# Patient Record
Sex: MALE | Race: WHITE | NOT HISPANIC OR LATINO | Employment: UNEMPLOYED | URBAN - METROPOLITAN AREA
[De-identification: names, ages, dates, MRNs, and addresses within clinical notes are randomized per-mention and may not be internally consistent; named-entity substitution may affect disease eponyms.]

---

## 2023-08-27 ENCOUNTER — APPOINTMENT (OUTPATIENT)
Dept: GENERAL RADIOLOGY | Facility: HOSPITAL | Age: 38
End: 2023-08-27
Payer: MEDICARE

## 2023-08-27 VITALS
HEART RATE: 73 BPM | DIASTOLIC BLOOD PRESSURE: 118 MMHG | HEIGHT: 74 IN | BODY MASS INDEX: 23.74 KG/M2 | TEMPERATURE: 97.9 F | OXYGEN SATURATION: 98 % | RESPIRATION RATE: 16 BRPM | SYSTOLIC BLOOD PRESSURE: 151 MMHG | WEIGHT: 185 LBS

## 2023-08-27 PROCEDURE — 73630 X-RAY EXAM OF FOOT: CPT

## 2023-08-27 PROCEDURE — 99283 EMERGENCY DEPT VISIT LOW MDM: CPT

## 2023-08-28 ENCOUNTER — HOSPITAL ENCOUNTER (EMERGENCY)
Facility: HOSPITAL | Age: 38
Discharge: HOME | End: 2023-08-28
Attending: EMERGENCY MEDICINE | Admitting: EMERGENCY MEDICINE
Payer: MEDICARE

## 2023-08-28 DIAGNOSIS — S90.122A CONTUSION OF FIFTH TOE OF LEFT FOOT, INITIAL ENCOUNTER: Primary | ICD-10-CM
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
## 2023-08-28 NOTE — DISCHARGE INSTRUCTIONS
Ice pack, elevation, Ace wrap for support, nonweightbearing with crutches for the next 3 days and advance as tolerated.  If pain persists, continue nonweightbearing for 1 week and follow-up with podiatry.  Return for increased pain, swelling, discoloration or any other concerns.  Tylenol or ibuprofen for discomfort.  Follow-up with your doctor for blood pressure recheck in 1 week.

## 2024-01-16 ENCOUNTER — APPOINTMENT (OUTPATIENT)
Dept: CT IMAGING | Facility: HOSPITAL | Age: 39
DRG: 418 | End: 2024-01-16
Payer: MEDICARE

## 2024-01-16 ENCOUNTER — HOSPITAL ENCOUNTER (INPATIENT)
Facility: HOSPITAL | Age: 39
LOS: 6 days | Discharge: HOME HEALTH | DRG: 418 | End: 2024-01-25
Attending: EMERGENCY MEDICINE | Admitting: INTERNAL MEDICINE
Payer: MEDICARE

## 2024-01-16 DIAGNOSIS — R11.2 NAUSEA VOMITING AND DIARRHEA: Primary | ICD-10-CM

## 2024-01-16 DIAGNOSIS — K81.9 CHOLECYSTITIS: ICD-10-CM

## 2024-01-16 DIAGNOSIS — R19.7 NAUSEA VOMITING AND DIARRHEA: Primary | ICD-10-CM

## 2024-01-16 DIAGNOSIS — D72.829 LEUKOCYTOSIS, UNSPECIFIED TYPE: ICD-10-CM

## 2024-01-16 DIAGNOSIS — R10.9 ABDOMINAL PAIN, UNSPECIFIED ABDOMINAL LOCATION: ICD-10-CM

## 2024-01-16 PROBLEM — R11.10 VOMITING: Status: ACTIVE | Noted: 2024-01-16

## 2024-01-16 LAB
ALBUMIN SERPL-MCNC: 4.8 G/DL (ref 3.5–5.2)
ALBUMIN/GLOB SERPL: 2.1 G/DL
ALP SERPL-CCNC: 61 U/L (ref 39–117)
ALT SERPL W P-5'-P-CCNC: 47 U/L (ref 1–41)
ANION GAP SERPL CALCULATED.3IONS-SCNC: 17 MMOL/L (ref 5–15)
AST SERPL-CCNC: 35 U/L (ref 1–40)
B PARAPERT DNA SPEC QL NAA+PROBE: NOT DETECTED
B PERT DNA SPEC QL NAA+PROBE: NOT DETECTED
BACTERIA UR QL AUTO: ABNORMAL /HPF
BASOPHILS # BLD AUTO: 0.1 10*3/MM3 (ref 0–0.2)
BASOPHILS NFR BLD AUTO: 0.4 % (ref 0–1.5)
BILIRUB SERPL-MCNC: 0.5 MG/DL (ref 0–1.2)
BILIRUB UR QL STRIP: NEGATIVE
BUN SERPL-MCNC: 14 MG/DL (ref 6–20)
BUN/CREAT SERPL: 18.2 (ref 7–25)
C PNEUM DNA NPH QL NAA+NON-PROBE: NOT DETECTED
CALCIUM SPEC-SCNC: 10.3 MG/DL (ref 8.6–10.5)
CHLORIDE SERPL-SCNC: 99 MMOL/L (ref 98–107)
CLARITY UR: CLEAR
CO2 SERPL-SCNC: 24 MMOL/L (ref 22–29)
COLOR UR: YELLOW
CREAT SERPL-MCNC: 0.77 MG/DL (ref 0.76–1.27)
DEPRECATED RDW RBC AUTO: 45.9 FL (ref 37–54)
EGFRCR SERPLBLD CKD-EPI 2021: 116.8 ML/MIN/1.73
EOSINOPHIL # BLD AUTO: 0.1 10*3/MM3 (ref 0–0.4)
EOSINOPHIL NFR BLD AUTO: 0.6 % (ref 0.3–6.2)
ERYTHROCYTE [DISTWIDTH] IN BLOOD BY AUTOMATED COUNT: 14.3 % (ref 12.3–15.4)
FLUAV SUBTYP SPEC NAA+PROBE: NOT DETECTED
FLUBV RNA ISLT QL NAA+PROBE: NOT DETECTED
GLOBULIN UR ELPH-MCNC: 2.3 GM/DL
GLUCOSE SERPL-MCNC: 171 MG/DL (ref 65–99)
GLUCOSE UR STRIP-MCNC: NEGATIVE MG/DL
HADV DNA SPEC NAA+PROBE: NOT DETECTED
HBA1C MFR BLD: 5.5 % (ref 4.8–5.6)
HCOV 229E RNA SPEC QL NAA+PROBE: NOT DETECTED
HCOV HKU1 RNA SPEC QL NAA+PROBE: NOT DETECTED
HCOV NL63 RNA SPEC QL NAA+PROBE: NOT DETECTED
HCOV OC43 RNA SPEC QL NAA+PROBE: NOT DETECTED
HCT VFR BLD AUTO: 50.7 % (ref 37.5–51)
HGB BLD-MCNC: 17.1 G/DL (ref 13–17.7)
HGB UR QL STRIP.AUTO: ABNORMAL
HMPV RNA NPH QL NAA+NON-PROBE: NOT DETECTED
HOLD SPECIMEN: NORMAL
HOLD SPECIMEN: NORMAL
HPIV1 RNA ISLT QL NAA+PROBE: NOT DETECTED
HPIV2 RNA SPEC QL NAA+PROBE: NOT DETECTED
HPIV3 RNA NPH QL NAA+PROBE: NOT DETECTED
HPIV4 P GENE NPH QL NAA+PROBE: NOT DETECTED
HYALINE CASTS UR QL AUTO: ABNORMAL /LPF
INR PPP: 4.51 (ref 2–3)
KETONES UR QL STRIP: NEGATIVE
LEUKOCYTE ESTERASE UR QL STRIP.AUTO: NEGATIVE
LIPASE SERPL-CCNC: 28 U/L (ref 13–60)
LYMPHOCYTES # BLD AUTO: 1.6 10*3/MM3 (ref 0.7–3.1)
LYMPHOCYTES NFR BLD AUTO: 10.1 % (ref 19.6–45.3)
M PNEUMO IGG SER IA-ACNC: NOT DETECTED
MAGNESIUM SERPL-MCNC: 1.7 MG/DL (ref 1.6–2.6)
MCH RBC QN AUTO: 29.8 PG (ref 26.6–33)
MCHC RBC AUTO-ENTMCNC: 33.8 G/DL (ref 31.5–35.7)
MCV RBC AUTO: 88.2 FL (ref 79–97)
MONOCYTES # BLD AUTO: 1.1 10*3/MM3 (ref 0.1–0.9)
MONOCYTES NFR BLD AUTO: 6.6 % (ref 5–12)
NEUTROPHILS NFR BLD AUTO: 13.4 10*3/MM3 (ref 1.7–7)
NEUTROPHILS NFR BLD AUTO: 82.3 % (ref 42.7–76)
NITRITE UR QL STRIP: NEGATIVE
NRBC BLD AUTO-RTO: 0.2 /100 WBC (ref 0–0.2)
PH UR STRIP.AUTO: 8.5 [PH] (ref 5–8)
PLATELET # BLD AUTO: 223 10*3/MM3 (ref 140–450)
PMV BLD AUTO: 9.7 FL (ref 6–12)
POTASSIUM SERPL-SCNC: 3.2 MMOL/L (ref 3.5–5.2)
PROT SERPL-MCNC: 7.1 G/DL (ref 6–8.5)
PROT UR QL STRIP: ABNORMAL
PROTHROMBIN TIME: 44 SECONDS (ref 19.4–28.5)
RBC # BLD AUTO: 5.74 10*6/MM3 (ref 4.14–5.8)
RBC # UR STRIP: ABNORMAL /HPF
REF LAB TEST METHOD: ABNORMAL
RHINOVIRUS RNA SPEC NAA+PROBE: NOT DETECTED
RSV RNA NPH QL NAA+NON-PROBE: NOT DETECTED
SARS-COV-2 RNA NPH QL NAA+NON-PROBE: NOT DETECTED
SODIUM SERPL-SCNC: 140 MMOL/L (ref 136–145)
SP GR UR STRIP: 1.01 (ref 1–1.03)
SQUAMOUS #/AREA URNS HPF: ABNORMAL /HPF
UROBILINOGEN UR QL STRIP: ABNORMAL
WBC # UR STRIP: ABNORMAL /HPF
WBC NRBC COR # BLD AUTO: 16.3 10*3/MM3 (ref 3.4–10.8)
WHOLE BLOOD HOLD COAG: NORMAL
WHOLE BLOOD HOLD SPECIMEN: NORMAL

## 2024-01-16 PROCEDURE — 25010000002 DICYCLOMINE PER 20 MG: Performed by: EMERGENCY MEDICINE

## 2024-01-16 PROCEDURE — 51798 US URINE CAPACITY MEASURE: CPT

## 2024-01-16 PROCEDURE — 83690 ASSAY OF LIPASE: CPT | Performed by: NURSE PRACTITIONER

## 2024-01-16 PROCEDURE — G0378 HOSPITAL OBSERVATION PER HR: HCPCS

## 2024-01-16 PROCEDURE — 81001 URINALYSIS AUTO W/SCOPE: CPT | Performed by: NURSE PRACTITIONER

## 2024-01-16 PROCEDURE — 25810000003 SODIUM CHLORIDE 0.9 % SOLUTION: Performed by: NURSE PRACTITIONER

## 2024-01-16 PROCEDURE — 83735 ASSAY OF MAGNESIUM: CPT | Performed by: NURSE PRACTITIONER

## 2024-01-16 PROCEDURE — 99285 EMERGENCY DEPT VISIT HI MDM: CPT

## 2024-01-16 PROCEDURE — 74174 CTA ABD&PLVS W/CONTRAST: CPT

## 2024-01-16 PROCEDURE — 85610 PROTHROMBIN TIME: CPT | Performed by: NURSE PRACTITIONER

## 2024-01-16 PROCEDURE — 83036 HEMOGLOBIN GLYCOSYLATED A1C: CPT | Performed by: NURSE PRACTITIONER

## 2024-01-16 PROCEDURE — 25010000002 DIPHENHYDRAMINE PER 50 MG: Performed by: NURSE PRACTITIONER

## 2024-01-16 PROCEDURE — 36415 COLL VENOUS BLD VENIPUNCTURE: CPT

## 2024-01-16 PROCEDURE — 25010000002 ONDANSETRON PER 1 MG: Performed by: NURSE PRACTITIONER

## 2024-01-16 PROCEDURE — 93005 ELECTROCARDIOGRAM TRACING: CPT | Performed by: NURSE PRACTITIONER

## 2024-01-16 PROCEDURE — 85025 COMPLETE CBC W/AUTO DIFF WBC: CPT | Performed by: NURSE PRACTITIONER

## 2024-01-16 PROCEDURE — 25510000001 IOPAMIDOL PER 1 ML: Performed by: NURSE PRACTITIONER

## 2024-01-16 PROCEDURE — 80053 COMPREHEN METABOLIC PANEL: CPT | Performed by: NURSE PRACTITIONER

## 2024-01-16 PROCEDURE — 25010000002 METOCLOPRAMIDE PER 10 MG: Performed by: NURSE PRACTITIONER

## 2024-01-16 PROCEDURE — 25010000002 MORPHINE PER 10 MG: Performed by: NURSE PRACTITIONER

## 2024-01-16 PROCEDURE — 0202U NFCT DS 22 TRGT SARS-COV-2: CPT | Performed by: NURSE PRACTITIONER

## 2024-01-16 RX ORDER — ACETAMINOPHEN 160 MG/5ML
650 SOLUTION ORAL EVERY 4 HOURS PRN
Status: DISCONTINUED | OUTPATIENT
Start: 2024-01-16 | End: 2024-01-25 | Stop reason: HOSPADM

## 2024-01-16 RX ORDER — SERTRALINE HYDROCHLORIDE 100 MG/1
200 TABLET, FILM COATED ORAL DAILY
COMMUNITY

## 2024-01-16 RX ORDER — ACETAMINOPHEN 325 MG/1
650 TABLET ORAL EVERY 4 HOURS PRN
Status: DISCONTINUED | OUTPATIENT
Start: 2024-01-16 | End: 2024-01-25 | Stop reason: HOSPADM

## 2024-01-16 RX ORDER — SERTRALINE HYDROCHLORIDE 100 MG/1
200 TABLET, FILM COATED ORAL DAILY
Status: DISCONTINUED | OUTPATIENT
Start: 2024-01-16 | End: 2024-01-25 | Stop reason: HOSPADM

## 2024-01-16 RX ORDER — SODIUM CHLORIDE 9 MG/ML
125 INJECTION, SOLUTION INTRAVENOUS CONTINUOUS
Status: DISCONTINUED | OUTPATIENT
Start: 2024-01-16 | End: 2024-01-25 | Stop reason: HOSPADM

## 2024-01-16 RX ORDER — SODIUM CHLORIDE 0.9 % (FLUSH) 0.9 %
10 SYRINGE (ML) INJECTION AS NEEDED
Status: DISCONTINUED | OUTPATIENT
Start: 2024-01-16 | End: 2024-01-25 | Stop reason: HOSPADM

## 2024-01-16 RX ORDER — DIPHENHYDRAMINE HYDROCHLORIDE 50 MG/ML
12.5 INJECTION INTRAMUSCULAR; INTRAVENOUS ONCE
Status: COMPLETED | OUTPATIENT
Start: 2024-01-16 | End: 2024-01-16

## 2024-01-16 RX ORDER — LOSARTAN POTASSIUM 50 MG/1
100 TABLET ORAL DAILY
Status: DISCONTINUED | OUTPATIENT
Start: 2024-01-16 | End: 2024-01-25 | Stop reason: HOSPADM

## 2024-01-16 RX ORDER — POLYETHYLENE GLYCOL 3350 17 G/17G
17 POWDER, FOR SOLUTION ORAL DAILY PRN
Status: DISCONTINUED | OUTPATIENT
Start: 2024-01-16 | End: 2024-01-25 | Stop reason: HOSPADM

## 2024-01-16 RX ORDER — ATORVASTATIN CALCIUM 10 MG/1
10 TABLET, FILM COATED ORAL NIGHTLY
Status: DISCONTINUED | OUTPATIENT
Start: 2024-01-16 | End: 2024-01-25 | Stop reason: HOSPADM

## 2024-01-16 RX ORDER — ACETAMINOPHEN 650 MG/1
650 SUPPOSITORY RECTAL EVERY 4 HOURS PRN
Status: DISCONTINUED | OUTPATIENT
Start: 2024-01-16 | End: 2024-01-25 | Stop reason: HOSPADM

## 2024-01-16 RX ORDER — BISACODYL 10 MG
10 SUPPOSITORY, RECTAL RECTAL DAILY PRN
Status: DISCONTINUED | OUTPATIENT
Start: 2024-01-16 | End: 2024-01-25 | Stop reason: HOSPADM

## 2024-01-16 RX ORDER — ATORVASTATIN CALCIUM 10 MG/1
10 TABLET, FILM COATED ORAL DAILY
COMMUNITY
Start: 2023-07-29

## 2024-01-16 RX ORDER — AMOXICILLIN 250 MG
2 CAPSULE ORAL 2 TIMES DAILY
Status: DISCONTINUED | OUTPATIENT
Start: 2024-01-16 | End: 2024-01-25 | Stop reason: HOSPADM

## 2024-01-16 RX ORDER — METOCLOPRAMIDE HYDROCHLORIDE 5 MG/ML
10 INJECTION INTRAMUSCULAR; INTRAVENOUS EVERY 6 HOURS PRN
Status: DISCONTINUED | OUTPATIENT
Start: 2024-01-16 | End: 2024-01-25 | Stop reason: HOSPADM

## 2024-01-16 RX ORDER — ONDANSETRON 2 MG/ML
4 INJECTION INTRAMUSCULAR; INTRAVENOUS ONCE
Status: COMPLETED | OUTPATIENT
Start: 2024-01-16 | End: 2024-01-16

## 2024-01-16 RX ORDER — NITROGLYCERIN 0.4 MG/1
0.4 TABLET SUBLINGUAL
Status: DISCONTINUED | OUTPATIENT
Start: 2024-01-16 | End: 2024-01-25 | Stop reason: HOSPADM

## 2024-01-16 RX ORDER — DICYCLOMINE HYDROCHLORIDE 10 MG/ML
20 INJECTION INTRAMUSCULAR 4 TIMES DAILY PRN
Status: DISCONTINUED | OUTPATIENT
Start: 2024-01-16 | End: 2024-01-25 | Stop reason: HOSPADM

## 2024-01-16 RX ORDER — SODIUM CHLORIDE 9 MG/ML
40 INJECTION, SOLUTION INTRAVENOUS AS NEEDED
Status: DISCONTINUED | OUTPATIENT
Start: 2024-01-16 | End: 2024-01-25 | Stop reason: HOSPADM

## 2024-01-16 RX ORDER — SODIUM CHLORIDE 0.9 % (FLUSH) 0.9 %
10 SYRINGE (ML) INJECTION EVERY 12 HOURS SCHEDULED
Status: DISCONTINUED | OUTPATIENT
Start: 2024-01-16 | End: 2024-01-25 | Stop reason: HOSPADM

## 2024-01-16 RX ORDER — LOSARTAN POTASSIUM 100 MG/1
100 TABLET ORAL DAILY
COMMUNITY

## 2024-01-16 RX ORDER — ALUMINA, MAGNESIA, AND SIMETHICONE 2400; 2400; 240 MG/30ML; MG/30ML; MG/30ML
15 SUSPENSION ORAL EVERY 6 HOURS PRN
Status: DISCONTINUED | OUTPATIENT
Start: 2024-01-16 | End: 2024-01-25 | Stop reason: HOSPADM

## 2024-01-16 RX ORDER — BISACODYL 5 MG/1
5 TABLET, DELAYED RELEASE ORAL DAILY PRN
Status: DISCONTINUED | OUTPATIENT
Start: 2024-01-16 | End: 2024-01-25 | Stop reason: HOSPADM

## 2024-01-16 RX ORDER — DICYCLOMINE HYDROCHLORIDE 10 MG/ML
20 INJECTION INTRAMUSCULAR ONCE
Status: COMPLETED | OUTPATIENT
Start: 2024-01-16 | End: 2024-01-16

## 2024-01-16 RX ORDER — WARFARIN SODIUM 7.5 MG/1
7.5 TABLET ORAL NIGHTLY
COMMUNITY
Start: 2023-10-25

## 2024-01-16 RX ORDER — POTASSIUM CHLORIDE 20 MEQ/1
40 TABLET, EXTENDED RELEASE ORAL EVERY 4 HOURS
Status: COMPLETED | OUTPATIENT
Start: 2024-01-16 | End: 2024-01-16

## 2024-01-16 RX ORDER — PROMETHAZINE HYDROCHLORIDE 12.5 MG/1
12.5 TABLET ORAL EVERY 6 HOURS PRN
Status: DISCONTINUED | OUTPATIENT
Start: 2024-01-16 | End: 2024-01-16

## 2024-01-16 RX ADMIN — ATORVASTATIN CALCIUM 10 MG: 10 TABLET, FILM COATED ORAL at 20:55

## 2024-01-16 RX ADMIN — DICYCLOMINE HYDROCHLORIDE 20 MG: 10 INJECTION, SOLUTION INTRAMUSCULAR at 10:19

## 2024-01-16 RX ADMIN — SERTRALINE 200 MG: 100 TABLET, FILM COATED ORAL at 13:03

## 2024-01-16 RX ADMIN — SODIUM CHLORIDE 125 ML/HR: 9 INJECTION, SOLUTION INTRAVENOUS at 23:33

## 2024-01-16 RX ADMIN — Medication 10 ML: at 20:56

## 2024-01-16 RX ADMIN — IOPAMIDOL 100 ML: 755 INJECTION, SOLUTION INTRAVENOUS at 08:07

## 2024-01-16 RX ADMIN — Medication 10 ML: at 13:03

## 2024-01-16 RX ADMIN — SODIUM CHLORIDE 125 ML/HR: 9 INJECTION, SOLUTION INTRAVENOUS at 16:54

## 2024-01-16 RX ADMIN — ACETAMINOPHEN 650 MG: 325 TABLET, FILM COATED ORAL at 18:10

## 2024-01-16 RX ADMIN — SODIUM CHLORIDE 1000 ML: 9 INJECTION, SOLUTION INTRAVENOUS at 07:15

## 2024-01-16 RX ADMIN — POTASSIUM CHLORIDE 40 MEQ: 1500 TABLET, EXTENDED RELEASE ORAL at 18:08

## 2024-01-16 RX ADMIN — POTASSIUM CHLORIDE 40 MEQ: 1500 TABLET, EXTENDED RELEASE ORAL at 14:15

## 2024-01-16 RX ADMIN — DIPHENHYDRAMINE HYDROCHLORIDE 12.5 MG: 50 INJECTION, SOLUTION INTRAMUSCULAR; INTRAVENOUS at 10:19

## 2024-01-16 RX ADMIN — ONDANSETRON 4 MG: 2 INJECTION INTRAMUSCULAR; INTRAVENOUS at 07:15

## 2024-01-16 RX ADMIN — LOSARTAN POTASSIUM 100 MG: 50 TABLET, FILM COATED ORAL at 13:03

## 2024-01-16 RX ADMIN — SODIUM CHLORIDE 125 ML/HR: 9 INJECTION, SOLUTION INTRAVENOUS at 10:20

## 2024-01-16 RX ADMIN — MORPHINE SULFATE 4 MG: 4 INJECTION, SOLUTION INTRAMUSCULAR; INTRAVENOUS at 07:15

## 2024-01-16 RX ADMIN — ACETAMINOPHEN 650 MG: 325 TABLET, FILM COATED ORAL at 23:34

## 2024-01-16 RX ADMIN — METOCLOPRAMIDE 10 MG: 5 INJECTION, SOLUTION INTRAMUSCULAR; INTRAVENOUS at 14:15

## 2024-01-16 NOTE — PROGRESS NOTES
"Pharmacy dosing service  Anticoagulant  Warfarin     Subjective:    James Rodriguez is a 38 y.o.male being continued on warfarin for Aortic Valve Replacement.    INR Goal: 2 - 3  Home medication?: warfarin 7.5 mg PO daily  Bridge Therapy Present?:  No  Interacting Medications Evaluation (New/Present/Discontinued):   Additional Contributing Factors:       Assessment/Plan:    INR on admission is 4.51 which is above the goal of 2-3. Will hold tonight and monitor INR trends for an appropriate time to restart.      Continue to monitor and adjust based on INR.         Date 1/16           INR 4.51           Dose HOLD               Objective:  [Ht: 190.5 cm (75\"); Wt: 77.1 kg (169 lb 15.6 oz); BMI: Body mass index is 21.25 kg/m².]    Lab Results   Component Value Date    ALBUMIN 4.8 01/16/2024     Lab Results   Component Value Date    INR 4.51 (H) 01/16/2024    INR 1.6 12/29/2022    INR 3.2 05/22/2021    PROTIME 44.0 (H) 01/16/2024    PROTIME 16.2 (H) 12/29/2022    PROTIME 33.5 (H) 05/22/2021     Lab Results   Component Value Date    HGB 17.1 01/16/2024     Lab Results   Component Value Date    HCT 50.7 01/16/2024       Andres Ledezma [facility]  01/16/24 14:34 EST       "

## 2024-01-16 NOTE — H&P
[facility] Observation Unit H&P    Patient Name: James Rodriguez  : 1985  MRN: [id number]  Primary Care Physician: Wally Henderson MD  Date of admission: 2024     Patient Care Team:  Wally Henderson MD as PCP - General (Internal Medicine)          Subjective   History Present Illness     Chief Complaint:   Chief Complaint   Patient presents with    Illness     Ilness    History of Present Illness  ED 2024   Context: 38-year-old male past medical history significant for Marfan's on warfarin who presents with his girlfriend with complaints of abdominal pain/epigastric pain radiating into his back with nausea vomiting and diarrhea.  Was seen in urgent care on  and states his cough and congestion has improved.  Girlfriend has also had GI symptoms of nausea vomiting and diarrhea as well.  He denies any fever or urinary complaints.  Denies any associated diaphoresis chest heaviness or pressure.  States he gets his INR checked every 3 months.    Observation 2024  Patient agrees with HPI noted above including generalized abdominal pain, nausea, vomiting and diarrhea for the past 2 days.  He reports that girlfriend had similar symptoms 2 days prior.  He denies any food triggers or recent travel.  Reports abdominal pain 8/10.  He states that he is on Coumadin for an artificial valve replacement but he cannot recall which valve.      Review of Systems   Constitutional: Positive for malaise/fatigue. Negative for chills and fever.   Gastrointestinal:  Positive for abdominal pain, diarrhea, nausea and vomiting. Negative for melena.   All other systems reviewed and are negative.          Personal History     Past Medical History:   Past Medical History:   Diagnosis Date    Hyperlipidemia     Hypertension     Marfan's disease        Surgical History:    History reviewed. No pertinent surgical history.        Family History: family history is not on file. Otherwise pertinent FHx was  reviewed and unremarkable.     Social History:  reports that he has been smoking cigarettes. He has been smoking an average of .5 packs per day. He has never used smokeless tobacco. He reports current alcohol use. He reports current drug use. Drug: Marijuana.      Medications:  Prior to Admission medications    Medication Sig Start Date End Date Taking? Authorizing Provider   atorvastatin (LIPITOR) 10 MG tablet Take 1 tablet by mouth Daily. 7/29/23  Yes Saul Ahn MD   losartan (COZAAR) 100 MG tablet Take 1 tablet by mouth Daily.   Yes Saul Ahn MD   sertraline (ZOLOFT) 100 MG tablet Take 2 tablets by mouth Daily.   Yes Saul Ahn MD   warfarin (COUMADIN) 7.5 MG tablet Take 1 tablet by mouth Every Night. Tuesday, Thursday, Saturday, Corey 10/25/23  Yes Saul Ahn MD   amoxicillin-clavulanate (AUGMENTIN) 875-125 MG per tablet Take 1 tablet by mouth 2 (Two) Times a Day. 12/22/23 1/16/24  Tyrell Min APRN   FLUoxetine (PROzac) 20 MG capsule Take 1 capsule by mouth Daily.  1/16/24  Saul Ahn MD   warfarin (COUMADIN) 3 MG tablet Take 1 tablet by mouth Daily.  1/16/24  ProviderSaul MD       Allergies:    Allergies   Allergen Reactions    Latex Other (See Comments)     Primary care provider        Objective   Objective     Vital Signs  Temp:  [96.9 °F (36.1 °C)] 96.9 °F (36.1 °C)  Heart Rate:  [48-55] 54  Resp:  [17-24] 17  BP: (158-183)/(65-82) 173/81  SpO2:  [98 %-100 %] 100 %  on   ;   Device (Oxygen Therapy): room air  Body mass index is 21.25 kg/m².    Physical Exam  Vitals and nursing note reviewed.   Constitutional:       Appearance: Normal appearance. He is ill-appearing.   HENT:      Head: Normocephalic and atraumatic.      Right Ear: External ear normal.      Left Ear: External ear normal.      Nose: Nose normal.      Mouth/Throat:      Mouth: Mucous membranes are moist.   Eyes:      Extraocular Movements: Extraocular movements intact.    Cardiovascular:      Rate and Rhythm: Normal rate and regular rhythm.      Pulses: Normal pulses.      Heart sounds: Normal heart sounds.   Pulmonary:      Effort: Pulmonary effort is normal.      Breath sounds: Normal breath sounds.   Abdominal:      General: Abdomen is flat. Bowel sounds are normal.      Palpations: Abdomen is soft.      Tenderness: There is abdominal tenderness.   Musculoskeletal:         General: Normal range of motion.      Cervical back: Normal range of motion.   Skin:     General: Skin is warm.   Neurological:      Mental Status: He is alert and oriented to person, place, and time.   Psychiatric:         Behavior: Behavior normal.         Thought Content: Thought content normal.         Judgment: Judgment normal.           Results Review:  I have personally reviewed most recent lab results and radiology images and interpretations and agree with findings, most notably: CMP, lipase, PT/INR, CBC and CT abdomen pelvis.    Results from last 7 days   Lab Units 01/16/24  0641   WBC 10*3/mm3 16.30*   HEMOGLOBIN g/dL 17.1   HEMATOCRIT % 50.7   PLATELETS 10*3/mm3 223   INR  4.51*     Results from last 7 days   Lab Units 01/16/24  0641   SODIUM mmol/L 140   POTASSIUM mmol/L 3.2*   CHLORIDE mmol/L 99   CO2 mmol/L 24.0   BUN mg/dL 14   CREATININE mg/dL 0.77   GLUCOSE mg/dL 171*   CALCIUM mg/dL 10.3   ALK PHOS U/L 61   ALT (SGPT) U/L 47*   AST (SGOT) U/L 35     Estimated Creatinine Clearance: 140.5 mL/min (by C-G formula based on SCr of 0.77 mg/dL).  Brief Urine Lab Results  (Last result in the past 365 days)        Color   Clarity   Blood   Leuk Est   Nitrite   Protein   CREAT   Urine HCG        01/16/24 0712 Yellow   Clear   Small (1+)   Negative   Negative   Trace                   Microbiology Results (last 10 days)       Procedure Component Value - Date/Time    Respiratory Panel PCR w/COVID-19(SARS-CoV-2) KATHERINE/GAUDENCIO/SARAH/PAD/COR/MICHAEL In-House, NP Swab in UTM/VTM, 2 HR TAT - Swab, Nasopharynx [[id number]]   (Normal) Collected: 01/16/24 0713    Lab Status: Final result Specimen: Swab from Nasopharynx Updated: 01/16/24 0821     ADENOVIRUS, PCR Not Detected     Coronavirus 229E Not Detected     Coronavirus HKU1 Not Detected     Coronavirus NL63 Not Detected     Coronavirus OC43 Not Detected     COVID19 Not Detected     Human Metapneumovirus Not Detected     Human Rhinovirus/Enterovirus Not Detected     Influenza A PCR Not Detected     Influenza B PCR Not Detected     Parainfluenza Virus 1 Not Detected     Parainfluenza Virus 2 Not Detected     Parainfluenza Virus 3 Not Detected     Parainfluenza Virus 4 Not Detected     RSV, PCR Not Detected     Bordetella pertussis pcr Not Detected     Bordetella parapertussis PCR Not Detected     Chlamydophila pneumoniae PCR Not Detected     Mycoplasma pneumo by PCR Not Detected    Narrative:      In the setting of a positive respiratory panel with a viral infection PLUS a negative procalcitonin without other underlying concern for bacterial infection, consider observing off antibiotics or discontinuation of antibiotics and continue supportive care. If the respiratory panel is positive for atypical bacterial infection (Bordetella pertussis, Chlamydophila pneumoniae, or Mycoplasma pneumoniae), consider antibiotic de-escalation to target atypical bacterial infection.            ECG/EMG Results (most recent)       Procedure Component Value Units Date/Time    ECG 12 Lead Chest Pain [[id number]] Collected: 01/16/24 0708     Updated: 01/16/24 0710     QT Interval 626 ms      QTC Interval 593 ms     Narrative:      HEART RATE= 54  bpm  RR Interval= 1116  ms  HI Interval= 204  ms  P Horizontal Axis= -48  deg  P Front Axis= 3  deg  QRSD Interval= 144  ms  QT Interval= 626  ms  QTcB= 593  ms  QRS Axis= 101  deg  T Wave Axis= 85  deg  - ABNORMAL ECG -  Sinus bradycardia  Borderline prolonged HI interval  Consider left ventricular hypertrophy  Prolonged QT interval  Electronically Signed By:   Date  "and Time of Study: 2024-01-16 07:08:57    ECG 12 Lead QT Measurement [[id number]] Collected: 01/16/24 1001     Updated: 01/16/24 1002     QT Interval 685 ms      QTC Interval 628 ms     Narrative:      HEART RATE= 51  bpm  RR Interval= 1188  ms  AZ Interval= 223  ms  P Horizontal Axis=   deg  P Front Axis= -48  deg  QRSD Interval= 152  ms  QT Interval= 685  ms  QTcB= 628  ms  QRS Axis= 100  deg  T Wave Axis= 90  deg  - ABNORMAL ECG -  Sinus or ectopic atrial bradycardia  Prolonged AZ interval  Consider left ventricular hypertrophy  Abnormal T, probable ischemia, lateral leads  Prolonged QT interval  When compared with ECG of 16-Jan-2024 7:08:57,  Significant change in rhythm: previously sinus  New or worsened ischemia or infarction  Electronically Signed By:   Date and Time of Study: 2024-01-16 10:01:32                    CT Angiogram Abdomen Pelvis    Result Date: 1/16/2024  Impression: 1. No evidence of aortoiliac aneurysm or stenosis. 2. Shotty, somewhat numerous lymph nodes in the small bowel mesentery, which may be reactive, possibly viral mesenteric lymphadenitis. 3. Fecalization of distal small bowel, with no visible obstruction. Short segment of distal ileum with numerous diverticuli. Although this is somewhat unusual, there is no visible inflammatory change to suggest small bowel diverticulitis. Consider follow-up scan if patient symptoms persist or worsen. 4. Questionable gallbladder \"sludge\". No visible gallbladder wall inflammation or evidence of biliary ductal dilatation. 5. No evidence of potential inflammatory focus, bowel obstruction, obstructive uropathy, or other acute disease is seen. Electronically Signed: Cesar Bernal MD  1/16/2024 8:55 AM EST  Workstation ID: [id number]       Estimated Creatinine Clearance: 140.5 mL/min (by C-G formula based on SCr of 0.77 mg/dL).    Assessment & Plan   Assessment/Plan       Active Hospital Problems    Diagnosis  POA    **Vomiting [R11.10]  Yes      Resolved " Hospital Problems   No resolved problems to display.       Vomiting and abdominal pain  Lab Results   Component Value Date    WBC 16.30 (H) 01/16/2024    AST 35 01/16/2024    ALT 47 (H) 01/16/2024    ALKPHOS 61 01/16/2024    BILITOT 0.5 01/16/2024    LIPASE 28 01/16/2024   -CMP showed potassium 3.2 this morning.  K. Dur x 2 ordered  -Lipase 28  -CBC showed WBC 16.38 and neutrophil 82.3  -UA negative for bacteria and nitrites  -CT of CMP abdomen and pelvis: Showed no evidence of a aortoiliac aneurysm or stenosis.  No evidence of bowel obstruction.  Shotty lymph nodes in the small bowel suggesting possible viral mesenteric lymphadenitis   -In the ED patient received Bentyl, Benadryl, and iopamidol, morphine, Zofran, 1 L normal saline bolus  -Tylenol, Bentyl, Reglan, promethazine ordered  -EKG showed sinus bradycardia with heart rate of 51 and prolonged QT interval.  Will avoid Zofran  -Clear liquid diet, advance as tolerated  -GI panel ordered  -Monitor labs while admitted     Hypertension  -Poorly controlled   BP Readings from Last 1 Encounters:   01/16/24 173/81   - Continue Cozaar  - Monitor while admitted     Hyperlipidemia  -Continue Lipitor    Depression/anxiety  -Continue Zoloft    Elevated INR  -Patient reports he is on Coumadin for an artificial heart valve.  Does not recall which valve  -INR 4.51  -Hold Coumadin today  -Consult pharmacy to dose Coumadin  -Recheck INR in a.m.    VTE Prophylaxis -   Mechanical Order History:        Ordered        01/16/24 1056  Place Sequential Compression Device  Once            01/16/24 1056  Maintain Sequential Compression Device  Continuous                          Pharmalogical Order History:       None            CODE STATUS:    Code Status and Medical Interventions:   Ordered at: 01/16/24 1046     Level Of Support Discussed With:    Patient     Code Status (Patient has no pulse and is not breathing):    CPR (Attempt to Resuscitate)     Medical Interventions (Patient has  pulse or is breathing):    Full Support       This patient has been examined wearing personal protective equipment.     I discussed the patient's findings and my recommendations with patient and nursing staff.      Signature:Electronically signed by STEVE Cosme, 01/16/24, 1:19 PM EST.

## 2024-01-16 NOTE — ED NOTES
"Patient requesting nausea medication post bladder scanner due to the pressure of the probe and that the [place] wipe smell was making him \"sick to his stomach\" ER provider notified   "

## 2024-01-16 NOTE — PLAN OF CARE
Problem: Adult Inpatient Plan of Care  Goal: Absence of Hospital-Acquired Illness or Injury  Intervention: Identify and Manage Fall Risk  Recent Flowsheet Documentation  Taken 1/16/2024 1244 by Amy Messina RN  Safety Promotion/Fall Prevention: safety round/check completed  Intervention: Prevent Skin Injury  Recent Flowsheet Documentation  Taken 1/16/2024 1244 by Amy Messina RN  Body Position: position changed independently  Intervention: Prevent and Manage VTE (Venous Thromboembolism) Risk  Recent Flowsheet Documentation  Taken 1/16/2024 1244 by Amy Messina RN  Activity Management: up ad yann  Goal: Optimal Comfort and Wellbeing  Intervention: Provide Person-Centered Care  Recent Flowsheet Documentation  Taken 1/16/2024 1244 by Amy Messina RN  Trust Relationship/Rapport:   care explained   choices provided   emotional support provided   empathic listening provided   questions answered   questions encouraged   reassurance provided   thoughts/feelings acknowledged  Goal: Readiness for Transition of Care  Intervention: Mutually Develop Transition Plan  Recent Flowsheet Documentation  Taken 1/16/2024 1258 by Amy Messina RN  Transportation Anticipated: family or friend will provide  Patient/Family Anticipated Services at Transition: none  Patient/Family Anticipates Transition to: home  Taken 1/16/2024 1257 by Amy Messina, RN  Equipment Currently Used at Home: none   Goal Outcome Evaluation:               New admit from ED to OBS with vomiting

## 2024-01-16 NOTE — ED PROVIDER NOTES
Subjective   History of Present Illness  Chief complaint: Abdominal pain      Context: 38-year-old male past medical history significant for Marfan's on warfarin who presents with his girlfriend with complaints of abdominal pain/epigastric pain radiating into his back with nausea vomiting and diarrhea.  Was seen in urgent care on December 22 and states his cough and congestion has improved.  Girlfriend has also had GI symptoms of nausea vomiting and diarrhea as well.  He denies any fever or urinary complaints.  Denies any associated diaphoresis chest heaviness or pressure.  States he gets his INR checked every 3 months.        PCP:         Review of Systems   Constitutional:  Negative for fever.   HENT:  Positive for congestion.    Respiratory:  Positive for cough.    Gastrointestinal:  Positive for abdominal pain.   Hematological:  Bruises/bleeds easily.         Allergies   Allergen Reactions    Latex Other (See Comments)     Primary care provider        No past surgical history on file.    No family history on file.    Social History     Socioeconomic History    Marital status: Single   Tobacco Use    Smoking status: Every Day     Packs/day: .5     Types: Cigarettes    Smokeless tobacco: Never   Vaping Use    Vaping Use: Some days   Substance and Sexual Activity    Alcohol use: Yes    Drug use: Yes     Types: Marijuana    Sexual activity: Defer           Objective   Physical Exam    Vital signs and triage nurse note reviewed.  Constitutional: Awake, alert; uncomfortable.  Girlfriend at bedside  HEENT: Normocephalic, atraumatic;  with intact EOM; oropharynx is pink and dry without exudate or erythema.  Neck: Supple, full range of motion without pain;    Cardiovascular: Regular rate and rhythm, normal S1-S2.  Murmur and click  Pulmonary: Respiratory effort regular nonlabored, breath sounds clear to auscultation all fields.  Abdomen: Soft, right upper quadrant right lower quadrant tenderness nondistended with  normoactive bowel sounds; no rebound or guarding.  Musculoskeletal: Independent range of motion of all extremities with no palpable tenderness or edema.  Extremities consistent with Marfan's  Neuro: Alert oriented x3, speech is clear and appropriate, GCS 15  Skin:  Fleshtone warm, dry, intact;       Procedures           ED Course  ED Course as of 01/16/24 1023   Tue Jan 16, 2024   0744 Waiting for patient to go to CAT scan [JW]      ED Course User Index  [JW] Jasmin Cabral, STEVE                                 Labs Reviewed   COMPREHENSIVE METABOLIC PANEL - Abnormal; Notable for the following components:       Result Value    Glucose 171 (*)     Potassium 3.2 (*)     ALT (SGPT) 47 (*)     Anion Gap 17.0 (*)     All other components within normal limits    Narrative:     GFR Normal >60  Chronic Kidney Disease <60  Kidney Failure <15     PROTIME-INR - Abnormal; Notable for the following components:    Protime 44.0 (*)     INR 4.51 (*)     All other components within normal limits   URINALYSIS W/ MICROSCOPIC IF INDICATED (NO CULTURE) - Abnormal; Notable for the following components:    pH, UA 8.5 (*)     Blood, UA Small (1+) (*)     Protein, UA Trace (*)     All other components within normal limits   CBC WITH AUTO DIFFERENTIAL - Abnormal; Notable for the following components:    WBC 16.30 (*)     Neutrophil % 82.3 (*)     Lymphocyte % 10.1 (*)     Neutrophils, Absolute 13.40 (*)     Monocytes, Absolute 1.10 (*)     All other components within normal limits   URINALYSIS, MICROSCOPIC ONLY - Abnormal; Notable for the following components:    RBC, UA 11-20 (*)     All other components within normal limits   RESPIRATORY PANEL PCR W/ COVID-19 (SARS-COV-2), NP SWAB IN UTM/VTP, 2 HR TAT - Normal    Narrative:     In the setting of a positive respiratory panel with a viral infection PLUS a negative procalcitonin without other underlying concern for bacterial infection, consider observing off antibiotics or discontinuation  of antibiotics and continue supportive care. If the respiratory panel is positive for atypical bacterial infection (Bordetella pertussis, Chlamydophila pneumoniae, or Mycoplasma pneumoniae), consider antibiotic de-escalation to target atypical bacterial infection.   MAGNESIUM - Normal   LIPASE - Normal   RAINBOW DRAW    Narrative:     The following orders were created for panel order [place] Draw.  Procedure                               Abnormality         Status                     ---------                               -----------         ------                     Green Top (Gel)[[id number]]                                  Final result               Lavender Top[[id number]]                                     Final result               Gold Top - SST[[id number]]                                   Final result               Light Blue Top[[id number]]                                   Final result                 Please view results for these tests on the individual orders.   GREEN TOP   LAVENDER TOP   GOLD TOP - SST   LIGHT BLUE TOP   CBC AND DIFFERENTIAL    Narrative:     The following orders were created for panel order CBC & Differential.  Procedure                               Abnormality         Status                     ---------                               -----------         ------                     CBC Auto Differential[[id number]]        Abnormal            Final result                 Please view results for these tests on the individual orders.     Medications   sodium chloride 0.9 % flush 10 mL (has no administration in time range)   sodium chloride 0.9 % infusion (125 mL/hr Intravenous New Bag 1/16/24 1020)   sodium chloride 0.9 % bolus 1,000 mL (0 mL Intravenous Stopped 1/16/24 0745)   ondansetron (ZOFRAN) injection 4 mg (4 mg Intravenous Given 1/16/24 0715)   morphine injection 4 mg (4 mg Intravenous Given 1/16/24 0715)   iopamidol (ISOVUE-370) 76 % injection 100 mL (100 mL Intravenous Given  "1/16/24 0807)   dicyclomine (BENTYL) injection 20 mg (20 mg Intramuscular Given 1/16/24 1019)   diphenhydrAMINE (BENADRYL) injection 12.5 mg (12.5 mg Intravenous Given 1/16/24 1019)     CT Angiogram Abdomen Pelvis    Result Date: 1/16/2024  Impression: 1. No evidence of aortoiliac aneurysm or stenosis. 2. Shotty, somewhat numerous lymph nodes in the small bowel mesentery, which may be reactive, possibly viral mesenteric lymphadenitis. 3. Fecalization of distal small bowel, with no visible obstruction. Short segment of distal ileum with numerous diverticuli. Although this is somewhat unusual, there is no visible inflammatory change to suggest small bowel diverticulitis. Consider follow-up scan if patient symptoms persist or worsen. 4. Questionable gallbladder \"sludge\". No visible gallbladder wall inflammation or evidence of biliary ductal dilatation. 5. No evidence of potential inflammatory focus, bowel obstruction, obstructive uropathy, or other acute disease is seen. Electronically Signed: Cesar Bernal MD  1/16/2024 8:55 AM EST  Workstation ID: [id number]   Prior to Admission medications    Medication Sig Start Date End Date Taking? Authorizing Provider   atorvastatin (LIPITOR) 10 MG tablet Take 1 tablet by mouth Daily. 7/29/23  Yes Saul Ahn MD   losartan (COZAAR) 100 MG tablet Take 1 tablet by mouth Daily.   Yes Saul Ahn MD   sertraline (ZOLOFT) 100 MG tablet Take 2 tablets by mouth Daily.   Yes Saul Ahn MD   warfarin (COUMADIN) 7.5 MG tablet Take 1 tablet by mouth Every Night. Tuesday, Thursday, Saturday, Corey 10/25/23  Yes Saul Ahn MD   amoxicillin-clavulanate (AUGMENTIN) 875-125 MG per tablet Take 1 tablet by mouth 2 (Two) Times a Day. 12/22/23 1/16/24  Tyrell Min APRN   FLUoxetine (PROzac) 20 MG capsule Take 1 capsule by mouth Daily.  1/16/24  Saul Ahn MD   warfarin (COUMADIN) 3 MG tablet Take 1 tablet by mouth Daily.  1/16/24  Provider, " "MD Saul                 Medical Decision Making      BP (!) 183/65   Pulse 55   Temp 96.9 °F (36.1 °C) (Rectal)   Resp 24   Ht 190.5 cm (75\")   Wt 77.1 kg (169 lb 15.6 oz)   SpO2 100%   BMI 21.25 kg/m²      Chart review:12/29/22: dr varela note: History of Marfan's with aortic valve repair at 12 with replacement of mechanical valve a few years later on warfarin.  Currently on hypertension hyperlipidemia medications as well that is uncontrolled will increase losartan.  12/22/2023: Urgent care note was given prescription for Augmentin for   sinusitis; of note patient did not start prescription      Radiology interpretation: CT reviewed independently and interpreted by me: Questionable sludge,  Further interpretation by radiologist as above  Lab interpretation:  Labs all viewed by me and significant for, RVP negative, trace hematuria, glucose 171, INR 4.5, white count 16.3    EKG at 708 viewed and interpreted by me and corroborated by Dr. Cooper, sinus bradycardia rate of 54, wandering baseline with artifact  comparison: No previous  Repeat EKG continues to have some wandering baseline sinus bradycardia rate of 51 with a prolonged QT interval            Appropriate PPE worn during exam.  Patient was placed on cardiac monitor had an IV established labs RVP and CT obtained to evaluate for viral illness dehydration dissection.  Was given IV fluids analgesia and antiemetics.  Has had ill contacts with his girlfriend having GI symptoms as well.  Bladder scan after CT was obtained notably approximately 400 mL of urine, subsequently voided approximately 350 mL.  He was asking for additional pain and nausea medicine and Benadryl with Bentyl was ordered along with normal saline 125 an hour.  Will be placed in observation for further evaluation and monitoring.  Elevated white count was felt to be reactive due to the excessive vomiting.      i discussed findings with patient who voices understanding of observation " placement.    Discussed with Dr. Barrera    Amount and/or Complexity of Data Reviewed  Labs: ordered.  Radiology: ordered.  ECG/medicine tests: ordered.    Risk  Prescription drug management.        Final diagnoses:   Nausea vomiting and diarrhea   Abdominal pain, unspecified abdominal location   Leukocytosis, unspecified type       ED Disposition  ED Disposition       ED Disposition   Decision to Admit    Condition   --    Comment   --               No follow-up provider specified.       Medication List        ASK your doctor about these medications      warfarin 7.5 MG tablet  Commonly known as: COUMADIN  Ask about: Which instructions should I use?                 Jasmin Cabral, APRN  01/16/24 1023

## 2024-01-17 ENCOUNTER — APPOINTMENT (OUTPATIENT)
Dept: ULTRASOUND IMAGING | Facility: HOSPITAL | Age: 39
DRG: 418 | End: 2024-01-17
Payer: MEDICARE

## 2024-01-17 ENCOUNTER — INPATIENT HOSPITAL (OUTPATIENT)
Dept: URBAN - METROPOLITAN AREA HOSPITAL 84 | Facility: HOSPITAL | Age: 39
End: 2024-01-17
Payer: MEDICAID

## 2024-01-17 DIAGNOSIS — R93.3 ABNORMAL FINDINGS ON DIAGNOSTIC IMAGING OF OTHER PARTS OF DI: ICD-10-CM

## 2024-01-17 DIAGNOSIS — D72.829 ELEVATED WHITE BLOOD CELL COUNT, UNSPECIFIED: ICD-10-CM

## 2024-01-17 DIAGNOSIS — R11.2 NAUSEA WITH VOMITING, UNSPECIFIED: ICD-10-CM

## 2024-01-17 DIAGNOSIS — R94.5 ABNORMAL RESULTS OF LIVER FUNCTION STUDIES: ICD-10-CM

## 2024-01-17 DIAGNOSIS — R10.13 EPIGASTRIC PAIN: ICD-10-CM

## 2024-01-17 LAB
ADV 40+41 DNA STL QL NAA+NON-PROBE: NOT DETECTED
ALPHA1 GLOB MFR UR ELPH: 182 MG/DL (ref 90–200)
ANION GAP SERPL CALCULATED.3IONS-SCNC: 12 MMOL/L (ref 5–15)
ASTRO TYP 1-8 RNA STL QL NAA+NON-PROBE: NOT DETECTED
BASOPHILS # BLD AUTO: 0 10*3/MM3 (ref 0–0.2)
BASOPHILS NFR BLD AUTO: 0.3 % (ref 0–1.5)
BUN SERPL-MCNC: 8 MG/DL (ref 6–20)
BUN/CREAT SERPL: 15.1 (ref 7–25)
C CAYETANENSIS DNA STL QL NAA+NON-PROBE: NOT DETECTED
C COLI+JEJ+UPSA DNA STL QL NAA+NON-PROBE: NOT DETECTED
CALCIUM SPEC-SCNC: 9.5 MG/DL (ref 8.6–10.5)
CERULOPLASMIN SERPL-MCNC: 24 MG/DL (ref 16–31)
CHLORIDE SERPL-SCNC: 103 MMOL/L (ref 98–107)
CO2 SERPL-SCNC: 24 MMOL/L (ref 22–29)
CREAT SERPL-MCNC: 0.53 MG/DL (ref 0.76–1.27)
CRYPTOSP DNA STL QL NAA+NON-PROBE: NOT DETECTED
DEPRECATED RDW RBC AUTO: 42.4 FL (ref 37–54)
E HISTOLYT DNA STL QL NAA+NON-PROBE: NOT DETECTED
EAEC PAA PLAS AGGR+AATA ST NAA+NON-PRB: NOT DETECTED
EC STX1+STX2 GENES STL QL NAA+NON-PROBE: NOT DETECTED
EGFRCR SERPLBLD CKD-EPI 2021: 130.7 ML/MIN/1.73
EOSINOPHIL # BLD AUTO: 0 10*3/MM3 (ref 0–0.4)
EOSINOPHIL NFR BLD AUTO: 0 % (ref 0.3–6.2)
EPEC EAE GENE STL QL NAA+NON-PROBE: NOT DETECTED
ERYTHROCYTE [DISTWIDTH] IN BLOOD BY AUTOMATED COUNT: 13.8 % (ref 12.3–15.4)
ETEC LTA+ST1A+ST1B TOX ST NAA+NON-PROBE: NOT DETECTED
FERRITIN SERPL-MCNC: 206.6 NG/ML (ref 30–400)
G LAMBLIA DNA STL QL NAA+NON-PROBE: NOT DETECTED
GGT SERPL-CCNC: 28 U/L (ref 8–61)
GLUCOSE SERPL-MCNC: 113 MG/DL (ref 65–99)
HAV IGM SERPL QL IA: NORMAL
HBV CORE IGM SERPL QL IA: NORMAL
HBV SURFACE AG SERPL QL IA: NORMAL
HCT VFR BLD AUTO: 49.8 % (ref 37.5–51)
HCV AB SER DONR QL: NORMAL
HGB BLD-MCNC: 16.8 G/DL (ref 13–17.7)
INR PPP: 4.66 (ref 2–3)
LYMPHOCYTES # BLD AUTO: 1.1 10*3/MM3 (ref 0.7–3.1)
LYMPHOCYTES NFR BLD AUTO: 6.5 % (ref 19.6–45.3)
MCH RBC QN AUTO: 30 PG (ref 26.6–33)
MCHC RBC AUTO-ENTMCNC: 33.7 G/DL (ref 31.5–35.7)
MCV RBC AUTO: 89 FL (ref 79–97)
MONOCYTES # BLD AUTO: 1.7 10*3/MM3 (ref 0.1–0.9)
MONOCYTES NFR BLD AUTO: 9.7 % (ref 5–12)
NEUTROPHILS NFR BLD AUTO: 14.4 10*3/MM3 (ref 1.7–7)
NEUTROPHILS NFR BLD AUTO: 83.5 % (ref 42.7–76)
NOROVIRUS GI+II RNA STL QL NAA+NON-PROBE: NOT DETECTED
NRBC BLD AUTO-RTO: 0.1 /100 WBC (ref 0–0.2)
P SHIGELLOIDES DNA STL QL NAA+NON-PROBE: NOT DETECTED
PLATELET # BLD AUTO: 222 10*3/MM3 (ref 140–450)
PMV BLD AUTO: 10.1 FL (ref 6–12)
POTASSIUM SERPL-SCNC: 3.7 MMOL/L (ref 3.5–5.2)
PROTHROMBIN TIME: 45.3 SECONDS (ref 19.4–28.5)
QT INTERVAL: 626 MS
QT INTERVAL: 685 MS
QTC INTERVAL: 593 MS
QTC INTERVAL: 628 MS
RBC # BLD AUTO: 5.6 10*6/MM3 (ref 4.14–5.8)
RVA RNA STL QL NAA+NON-PROBE: NOT DETECTED
S ENT+BONG DNA STL QL NAA+NON-PROBE: NOT DETECTED
SAPO I+II+IV+V RNA STL QL NAA+NON-PROBE: NOT DETECTED
SHIGELLA SP+EIEC IPAH ST NAA+NON-PROBE: NOT DETECTED
SODIUM SERPL-SCNC: 139 MMOL/L (ref 136–145)
V CHOL+PARA+VUL DNA STL QL NAA+NON-PROBE: NOT DETECTED
V CHOLERAE DNA STL QL NAA+NON-PROBE: NOT DETECTED
WBC NRBC COR # BLD AUTO: 17.2 10*3/MM3 (ref 3.4–10.8)
Y ENTEROCOL DNA STL QL NAA+NON-PROBE: NOT DETECTED

## 2024-01-17 PROCEDURE — 25010000002 DICYCLOMINE PER 20 MG: Performed by: NURSE PRACTITIONER

## 2024-01-17 PROCEDURE — 85610 PROTHROMBIN TIME: CPT | Performed by: NURSE PRACTITIONER

## 2024-01-17 PROCEDURE — 99222 1ST HOSP IP/OBS MODERATE 55: CPT | Mod: FS | Performed by: NURSE PRACTITIONER

## 2024-01-17 PROCEDURE — 25810000003 SODIUM CHLORIDE 0.9 % SOLUTION: Performed by: NURSE PRACTITIONER

## 2024-01-17 PROCEDURE — 80048 BASIC METABOLIC PNL TOTAL CA: CPT | Performed by: NURSE PRACTITIONER

## 2024-01-17 PROCEDURE — 86015 ACTIN ANTIBODY EACH: CPT | Performed by: NURSE PRACTITIONER

## 2024-01-17 PROCEDURE — 82977 ASSAY OF GGT: CPT | Performed by: NURSE PRACTITIONER

## 2024-01-17 PROCEDURE — 86381 MITOCHONDRIAL ANTIBODY EACH: CPT | Performed by: NURSE PRACTITIONER

## 2024-01-17 PROCEDURE — 25010000002 METOCLOPRAMIDE PER 10 MG: Performed by: NURSE PRACTITIONER

## 2024-01-17 PROCEDURE — 80074 ACUTE HEPATITIS PANEL: CPT | Performed by: NURSE PRACTITIONER

## 2024-01-17 PROCEDURE — 82103 ALPHA-1-ANTITRYPSIN TOTAL: CPT | Performed by: NURSE PRACTITIONER

## 2024-01-17 PROCEDURE — G0378 HOSPITAL OBSERVATION PER HR: HCPCS

## 2024-01-17 PROCEDURE — 85025 COMPLETE CBC W/AUTO DIFF WBC: CPT | Performed by: NURSE PRACTITIONER

## 2024-01-17 PROCEDURE — 82390 ASSAY OF CERULOPLASMIN: CPT | Performed by: NURSE PRACTITIONER

## 2024-01-17 PROCEDURE — 82728 ASSAY OF FERRITIN: CPT | Performed by: NURSE PRACTITIONER

## 2024-01-17 PROCEDURE — 87507 IADNA-DNA/RNA PROBE TQ 12-25: CPT | Performed by: NURSE PRACTITIONER

## 2024-01-17 PROCEDURE — 86038 ANTINUCLEAR ANTIBODIES: CPT | Performed by: NURSE PRACTITIONER

## 2024-01-17 PROCEDURE — 76705 ECHO EXAM OF ABDOMEN: CPT

## 2024-01-17 RX ORDER — PANTOPRAZOLE SODIUM 40 MG/10ML
40 INJECTION, POWDER, LYOPHILIZED, FOR SOLUTION INTRAVENOUS
Status: DISCONTINUED | OUTPATIENT
Start: 2024-01-17 | End: 2024-01-25 | Stop reason: HOSPADM

## 2024-01-17 RX ORDER — MINERAL OIL 100 G/100G
1 OIL RECTAL ONCE
Status: COMPLETED | OUTPATIENT
Start: 2024-01-17 | End: 2024-01-17

## 2024-01-17 RX ORDER — ONDANSETRON 2 MG/ML
4 INJECTION INTRAMUSCULAR; INTRAVENOUS EVERY 6 HOURS PRN
Status: DISCONTINUED | OUTPATIENT
Start: 2024-01-17 | End: 2024-01-25 | Stop reason: HOSPADM

## 2024-01-17 RX ORDER — POLYETHYLENE GLYCOL 3350 17 G/17G
17 POWDER, FOR SOLUTION ORAL 2 TIMES DAILY
Status: DISCONTINUED | OUTPATIENT
Start: 2024-01-17 | End: 2024-01-25 | Stop reason: HOSPADM

## 2024-01-17 RX ADMIN — MINERAL OIL 1 ENEMA: 100 ENEMA RECTAL at 17:17

## 2024-01-17 RX ADMIN — DOCUSATE SODIUM AND SENNOSIDES 2 TABLET: 8.6; 5 TABLET, FILM COATED ORAL at 09:26

## 2024-01-17 RX ADMIN — ACETAMINOPHEN 650 MG: 325 TABLET, FILM COATED ORAL at 03:56

## 2024-01-17 RX ADMIN — LOSARTAN POTASSIUM 100 MG: 50 TABLET, FILM COATED ORAL at 21:17

## 2024-01-17 RX ADMIN — Medication 10 ML: at 09:28

## 2024-01-17 RX ADMIN — DOCUSATE SODIUM AND SENNOSIDES 2 TABLET: 8.6; 5 TABLET, FILM COATED ORAL at 21:16

## 2024-01-17 RX ADMIN — SERTRALINE 200 MG: 100 TABLET, FILM COATED ORAL at 21:17

## 2024-01-17 RX ADMIN — POLYETHYLENE GLYCOL 3350 17 G: 17 POWDER, FOR SOLUTION ORAL at 21:16

## 2024-01-17 RX ADMIN — ALUMINUM HYDROXIDE, MAGNESIUM HYDROXIDE, AND DIMETHICONE 15 ML: 400; 400; 40 SUSPENSION ORAL at 09:26

## 2024-01-17 RX ADMIN — METOCLOPRAMIDE 10 MG: 5 INJECTION, SOLUTION INTRAMUSCULAR; INTRAVENOUS at 09:26

## 2024-01-17 RX ADMIN — Medication 10 ML: at 21:17

## 2024-01-17 RX ADMIN — METOCLOPRAMIDE 10 MG: 5 INJECTION, SOLUTION INTRAMUSCULAR; INTRAVENOUS at 18:31

## 2024-01-17 RX ADMIN — PANTOPRAZOLE SODIUM 40 MG: 40 INJECTION, POWDER, LYOPHILIZED, FOR SOLUTION INTRAVENOUS at 17:17

## 2024-01-17 RX ADMIN — ATORVASTATIN CALCIUM 10 MG: 10 TABLET, FILM COATED ORAL at 21:16

## 2024-01-17 RX ADMIN — ACETAMINOPHEN 650 MG: 325 TABLET, FILM COATED ORAL at 09:26

## 2024-01-17 RX ADMIN — DICYCLOMINE HYDROCHLORIDE 20 MG: 10 INJECTION, SOLUTION INTRAMUSCULAR at 21:17

## 2024-01-17 RX ADMIN — SODIUM CHLORIDE 125 ML/HR: 9 INJECTION, SOLUTION INTRAVENOUS at 07:35

## 2024-01-17 RX ADMIN — SODIUM CHLORIDE 125 ML/HR: 9 INJECTION, SOLUTION INTRAVENOUS at 17:16

## 2024-01-17 NOTE — CONSULTS
GI CONSULT  NOTE:    Referring Provider:  NILESH Villeda    Chief complaint: Abdominal pain, nausea/vomiting    Subjective .     History of present illness: James Rodriguez is a 38 y.o. male with history of Marfan syndrome, hypertension, hyperlipidemia, abdominal surgery as a baby, and valve replacement on Coumadin who presents with complaints of abdominal pain and nausea/vomiting.  The patient reports that he began having epigastric pain approximately 4 to 5 days ago.  The pain is intermittent, but will last up to 1 hour at a time.  Unable to identify any exacerbating or alleviating factors.  Does complain of associated nausea/vomiting.  No heartburn or dysphagia.  Denies NSAID use.  States that his last bowel movement occurred 2 days ago.  States that he typically moves his bowels regularly at home.  Denies any bright red blood per rectum or melena.  He is unsure of unintentional weight loss.  No recent fever.      Endo History:  No previous endoscopy.    Past Medical History:  Past Medical History:   Diagnosis Date    Hyperlipidemia     Hypertension     Marfan's disease        Past Surgical History:  History reviewed. No pertinent surgical history.    Social History:  Social History     Tobacco Use    Smoking status: Every Day     Packs/day: .5     Types: Cigarettes    Smokeless tobacco: Never   Vaping Use    Vaping Use: Some days   Substance Use Topics    Alcohol use: Yes    Drug use: Yes     Types: Marijuana       Family History:  History reviewed. No pertinent family history.    Medications:  Medications Prior to Admission   Medication Sig Dispense Refill Last Dose    atorvastatin (LIPITOR) 10 MG tablet Take 1 tablet by mouth Daily.   1/15/2024    losartan (COZAAR) 100 MG tablet Take 1 tablet by mouth Daily.   1/15/2024    sertraline (ZOLOFT) 100 MG tablet Take 2 tablets by mouth Daily.   1/15/2024    warfarin (COUMADIN) 7.5 MG tablet Take 1 tablet by mouth Every Night. Tuesday, Thursday, Saturday, Sunday    Past Week       Scheduled Meds:atorvastatin, 10 mg, Oral, Nightly  losartan, 100 mg, Oral, Daily  mineral oil, 1 enema, Rectal, Once  polyethylene glycol, 17 g, Oral, BID  senna-docusate sodium, 2 tablet, Oral, BID  sertraline, 200 mg, Oral, Daily  sodium chloride, 10 mL, Intravenous, Q12H      Continuous Infusions:Pharmacy Consult - Pharmacy to dose,   sodium chloride, 125 mL/hr, Last Rate: 125 mL/hr (01/17/24 1420)      PRN Meds:.  acetaminophen **OR** acetaminophen **OR** acetaminophen    aluminum-magnesium hydroxide-simethicone    senna-docusate sodium **AND** polyethylene glycol **AND** bisacodyl **AND** bisacodyl    dicyclomine    metoclopramide    nitroglycerin    ondansetron    Pharmacy Consult - Pharmacy to dose    [COMPLETED] Insert Peripheral IV **AND** sodium chloride    sodium chloride    sodium chloride    ALLERGIES:  Latex    ROS:  The following systems were reviewed and negative;   Constitution:  No fevers, chills, no unintentional weight loss  Skin: no rash, no jaundice  Eyes:  No blurry vision, no eye pain  HENT:  No change in hearing or smell  Resp:  No dyspnea or cough  CV:  No chest pain or palpitations  :  No dysuria, hematuria  Musculoskeletal:  No leg cramps or arthralgias  Neuro:  No tremor, no numbness  Psych:  No depression or confusion    Objective     Vital Signs:   Vitals:    01/17/24 0345 01/17/24 0604 01/17/24 1019 01/17/24 1413   BP: 161/72 166/85 140/61 180/76   BP Location: Right arm Right arm Right arm Right arm   Patient Position: Lying Lying Lying Lying   Pulse: 59  56 58   Resp: 17 18 16 21   Temp: 98.3 °F (36.8 °C) 98 °F (36.7 °C) 97.7 °F (36.5 °C) 97.6 °F (36.4 °C)   TempSrc: Oral Oral Oral Oral   SpO2: 98%  96% 98%   Weight:       Height:           Physical Exam:       General Appearance:    Awake and alert, in no acute distress   Head:    Normocephalic, without obvious abnormality, atraumatic   Throat:   No oral lesions, no thrush, oral mucosa moist   Lungs:      "Respirations regular, even and unlabored   Chest Wall:    No abnormalities observed   Abdomen:     Soft, epigastric tenderness no rebound or guarding, non-distended   Rectal:     Deferred   Extremities:   Moves all extremities, no edema, no cyanosis   Pulses:   Pulses palpable and equal bilaterally   Skin:   No rash, no jaundice, normal palpation   Lymph nodes:   No cervical, supraclavicular or submandibular palpable adenopathy   Neurologic:   Cranial nerves 2 - 12 grossly intact, no asterixis       Results Review:   I reviewed the patient's labs and imaging.  CBC    Results from last 7 days   Lab Units 01/17/24  0358 01/16/24  0641   WBC 10*3/mm3 17.20* 16.30*   HEMOGLOBIN g/dL 16.8 17.1   PLATELETS 10*3/mm3 222 223     CMP   Results from last 7 days   Lab Units 01/17/24  0358 01/16/24  0641   SODIUM mmol/L 139 140   POTASSIUM mmol/L 3.7 3.2*   CHLORIDE mmol/L 103 99   CO2 mmol/L 24.0 24.0   BUN mg/dL 8 14   CREATININE mg/dL 0.53* 0.77   GLUCOSE mg/dL 113* 171*   ALBUMIN g/dL  --  4.8   BILIRUBIN mg/dL  --  0.5   ALK PHOS U/L  --  61   AST (SGOT) U/L  --  35   ALT (SGPT) U/L  --  47*   MAGNESIUM mg/dL  --  1.7   LIPASE U/L  --  28     Cr Clearance Estimated Creatinine Clearance: 204.1 mL/min (A) (by C-G formula based on SCr of 0.53 mg/dL (L)).  Coag   Results from last 7 days   Lab Units 01/17/24  0358 01/16/24 0641   INR  4.66* 4.51*     HbA1C   Lab Results   Component Value Date    HGBA1C 5.50 01/16/2024     Blood Glucose No results found for: \"POCGLU\"  Infection     UA    Results from last 7 days   Lab Units 01/16/24  0712   NITRITE UA  Negative   WBC UA /HPF 0-2   BACTERIA UA /HPF None Seen   SQUAM EPITHEL UA /HPF 0-2     Imaging Results (Last 72 Hours)       Procedure Component Value Units Date/Time    CT Angiogram Abdomen Pelvis [[id number]] Collected: 01/16/24 0839     Updated: 01/16/24 0858    Narrative:      CT ANGIOGRAM ABDOMEN PELVIS    Date of Exam: 1/16/2024 7:59 AM EST    Indication: abd pain " "radiating to back; hx marfans.    Comparison: None available.    Technique: CTA of the abdomen and pelvis was performed before and after the uneventful intravenous administration of iodinated contrast. Reconstructed coronal and sagittal images were also obtained. In addition, a 3-D volume rendered image was created   for interpretation. Automated exposure control and iterative reconstruction methods were used.      Findings:  The included lower lungs appear clear. There appears to be a small hiatal hernia. Radiodense material in the hernia in the stomach likely represent pill fragments. There is diffuse fatty liver change. Gallbladder is distended typical of fasting state,   and appears slightly heterogeneous and hyperdense, perhaps due to gallbladder \"sludge\". Spleen is not enlarged. No significant abnormalities are seen in the pancreas or adrenal glands. Kidneys appear unremarkable except for small cysts.     No upper abdominal free air or ascites is seen. Better appreciated on coronal images, specifically images 25 through 39, are shotty small bowel mesenteric lymph nodes a little more numerous than typically seen, possibly reactive, as may be seen with   mesenteric lymphadenitis.    Regarding the lower abdomen and pelvis, the cecum and appendix appear normal. There is some fecalization of the distal ileum, and on axial images 68 through 77, what appear to be numerous diverticuli that appear to be arising from a redundant loop of   ileum, rather than a redundant loop of colon. There is, however, no evidence of any associated inflammation, and no similar findings elsewhere.    There appears to be a mild degree of fecal stasis. No impaction is seen. No bowel wall inflammatory changes are identified. Bladder is moderately distended and normal in appearance. Prostate is not enlarged. Note is made of previous left groin surgery,   and also some fatty atrophic change of the left gluteus edwin.    The lower thoracic " "aorta abdominal aorta, iliac and proximal superficial femoral vessels are normal in caliber with no evidence of aneurysm, dissection or significant stenosis. Celiac axis SMA, and renal arteries are normal in caliber.        Impression:      Impression:    1. No evidence of aortoiliac aneurysm or stenosis.    2. Shotty, somewhat numerous lymph nodes in the small bowel mesentery, which may be reactive, possibly viral mesenteric lymphadenitis.    3. Fecalization of distal small bowel, with no visible obstruction. Short segment of distal ileum with numerous diverticuli. Although this is somewhat unusual, there is no visible inflammatory change to suggest small bowel diverticulitis. Consider   follow-up scan if patient symptoms persist or worsen.    4. Questionable gallbladder \"sludge\". No visible gallbladder wall inflammation or evidence of biliary ductal dilatation.    5. No evidence of potential inflammatory focus, bowel obstruction, obstructive uropathy, or other acute disease is seen.        Electronically Signed: Cesar Bernal MD    1/16/2024 8:55 AM EST    Workstation ID: [id number]            ASSESSMENT:  -Epigastric pain  -Nausea/vomiting  -Abnormal CTA showing possible viral mesenteric lymphadenitis along with fecalization of the distal small bowel  -Mildly elevated LFTs  -Coumadin toxicity  -Leukocytosis  -History of valve replacement on Coumadin  -Marfan syndrome  -Hypertension  -Hyperlipidemia  -History of abdominal surgery as a baby    PLAN:  Patient is a 38-year-old male with history of Marfan syndrome, abdominal surgery as an infant, and valve replacement on Coumadin who presented on 1/16 with complaints of abdominal pain and nausea/vomiting.    CTA on admission shows no evidence of aneurysm or stenosis, possible viral mesenteric lymphadenitis, fecalization of the distal small bowel, and possible gallbladder sludge.  Mild elevation of LFTs noted with ALT of 47.  Plan RUQ US.  Check liver serologies.  WBC count " 17.2.  Continue to monitor.  Will plan mineral oil enema x 1 and start daily bowel regimen.  Could consider EGD for no improvement in symptoms, however INR would need to normalize prior to proceeding with endoscopy.  INR 4.66.  Coumadin on hold.  Antiemetics/analgesics as needed.  Start PPI.  Clear liquid diet.  Supportive care.      I discussed the patients findings and my recommendations with the patient.  I will discuss the case with Dr. Sosa and change plan accordingly.    We appreciate the referral.    Electronically signed by STEVE Summers, 01/17/24, 2:27 PM EST.

## 2024-01-17 NOTE — PLAN OF CARE
"Goal Outcome Evaluation:  Plan of Care Reviewed With: patient        Progress: improving  Outcome Evaluation: Pt sleeping with no complaints of nausea or vomiting. Pt mainly had complaints about pain in abdomen, Tylenol given because that is what patient requested, bentyl not given because order is for IM and pt states \"he does not like shots.\" NS running at 125mLs/hr. Stool sample still needed.                               "

## 2024-01-17 NOTE — PLAN OF CARE
Problem: Adult Inpatient Plan of Care  Goal: Plan of Care Review  Outcome: Ongoing, Progressing  Goal: Patient-Specific Goal (Individualized)  Outcome: Ongoing, Progressing  Goal: Absence of Hospital-Acquired Illness or Injury  Outcome: Ongoing, Progressing  Intervention: Identify and Manage Fall Risk  Recent Flowsheet Documentation  Taken 1/17/2024 1200 by Aleksey Chandler RN  Safety Promotion/Fall Prevention: safety round/check completed  Taken 1/17/2024 1000 by Aleksey Chandler RN  Safety Promotion/Fall Prevention: safety round/check completed  Taken 1/17/2024 0800 by Aleksey Chandler RN  Safety Promotion/Fall Prevention: safety round/check completed  Taken 1/17/2024 0739 by Aleksey Chandler RN  Safety Promotion/Fall Prevention: safety round/check completed  Intervention: Prevent Skin Injury  Recent Flowsheet Documentation  Taken 1/17/2024 0739 by Aleksey Chandler RN  Body Position: position changed independently  Intervention: Prevent and Manage VTE (Venous Thromboembolism) Risk  Recent Flowsheet Documentation  Taken 1/17/2024 0739 by Aleksey Chandler RN  Activity Management: up ad yann  Goal: Optimal Comfort and Wellbeing  Outcome: Ongoing, Progressing  Intervention: Monitor Pain and Promote Comfort  Recent Flowsheet Documentation  Taken 1/17/2024 0739 by Aleksey Chandler RN  Pain Management Interventions: see MAR  Intervention: Provide Person-Centered Care  Recent Flowsheet Documentation  Taken 1/17/2024 0739 by Aleksey Chandler RN  Trust Relationship/Rapport: care explained  Goal: Readiness for Transition of Care  Outcome: Ongoing, Progressing   Goal Outcome Evaluation:   GI consult done for pt. GI said pt may have stool causing pain possible enema. Pt states nausea with dry heaving on occasion. Pt given reglan and mag hydroxide to help with stomach. Will continue to monitor pt

## 2024-01-17 NOTE — CASE MANAGEMENT/SOCIAL WORK
Social Work Assessment  [facility]     Patient Name: James Rodriguez  MRN: [id number]  Today's Date: 1/17/2024    Admit Date: 1/16/2024     Discharge Plan       Row Name 01/17/24 1441       Plan    Plan Home with Girlfriend    Plan Comments SW reviewed chart and met with patient at bedside.  Patient resides with Girlfriend and another Roommate.  Patient does not work but is on a fixed income -SSI or SSD.  Patient is able to get food and his medications.  He has a PCP and uses [facility] on [facility].  He is not interested on B.  No identified DME in home or needed at D/C.  Girlfriend will transport home.                ADONAY Prabhakar MSW    Phone: [phone]  Cell: [phone]  Fax: [phone]  [email or web address]

## 2024-01-17 NOTE — SIGNIFICANT NOTE
01/17/24 1438   Living Situation   Current Living Arrangements home   Potentially Unsafe Housing Conditions none   Food Insecurity   Within the past 12 months, you worried that your food would run out before you got the money to buy more. Never true   Within the past 12 months, the food you bought just didn't last and you didn't have money to get more. Never true   Transportation Needs   In the past 12 months, has lack of transportation kept you from medical appointments or from getting medications? no  (Girlfriend provides transportation)   In the past 12 months, has lack of transportation kept you from meetings, work, or from getting things needed for daily living? No   Utilities   In the past 12 months has the electric, gas, oil, or water company threatened to shut off services in your home? No   Abuse Screen (yes response referral indicated)   Feels Unsafe at Home or Work/School no   Feels Threatened by Someone no   Does Anyone Try to Keep You From Having Contact with Others or Doing Things Outside Your Home? no   Physical Signs of Abuse Present no   Financial Resource Strain   How hard is it for you to pay for the very basics like food, housing, medical care, and heating? Not hard   Employment   Do you want help finding or keeping work or a job? I do not need or want help   Family and Community Support   If for any reason you need help with day-to-day activities such as bathing, preparing meals, shopping, managing finances, etc., do you get the help you need? I don't need any help   How often do you feel lonely or isolated from those around you? Never   Education   Preferred Language English   Physical Activity   On average, how many days per week do you engage in moderate to strenuous exercise (like a brisk walk)? 2 days   On average, how many minutes do you engage in exercise at this level? 20 min   Number of minutes of exercise per week (!) 40   Alcohol Use   Q1: How often do you have a drink containing  alcohol? Monthly or l   Q2: How many drinks containing alcohol do you have on a typical day when you are drinking? 1 or 2   Q3: How often do you have six or more drinks on one occasion? Never   Stress   Do you feel stress - tense, restless, nervous, or anxious, or unable to sleep at night because your mind is troubled all the time - these days? Not at all   Mental Health   Little Interest or Pleasure in Doing Things 0-->not at all   Feeling Down, Depressed or Hopeless 1-->several days   Disabilities   Concentrating, Remembering or Making Decisions Difficulty no   Doing Errands Independently Difficulty (such as shopping) no

## 2024-01-17 NOTE — PROGRESS NOTES
[facility]    Patient Name: James Rodriguez  : 1985  MRN: [id number]  Primary Care Physician: Wally Henderson MD  Date of admission: 2024     Patient Care Team:  Wally Henderson MD as PCP - General (Internal Medicine)          Subjective   History Present Illness     Chief Complaint:   Chief Complaint   Patient presents with    Illness     Abdominal pain    History of Present Illness    ED 2024   Context: 38-year-old male past medical history significant for Marfan's on warfarin who presents with his girlfriend with complaints of abdominal pain/epigastric pain radiating into his back with nausea vomiting and diarrhea.  Was seen in urgent care on  and states his cough and congestion has improved.  Girlfriend has also had GI symptoms of nausea vomiting and diarrhea as well.  He denies any fever or urinary complaints.  Denies any associated diaphoresis chest heaviness or pressure.  States he gets his INR checked every 3 months.     Observation 2024  Patient agrees with HPI noted above including generalized abdominal pain, nausea, vomiting and diarrhea for the past 2 days.  He reports that girlfriend had similar symptoms 2 days prior.  He denies any food triggers or recent travel.  Reports abdominal pain 8/10.  He states that he is on Coumadin for an artificial valve replacement but he cannot recall which valve.      Observation 24  Pt still having abdominal cramping, nausea. GI consulted.     ROS    Constitutional: Positive for malaise/fatigue. Negative for chills and fever.   Gastrointestinal:  Positive for abdominal pain, diarrhea, nausea and vomiting. Negative for melena.   All other systems reviewed and are negative.      Personal History     Past Medical History:   Past Medical History:   Diagnosis Date    Hyperlipidemia     Hypertension     Marfan's disease        Surgical History:    History reviewed. No pertinent surgical  history.        Family History: family history is not on file. Otherwise pertinent FHx was reviewed and unremarkable.     Social History:  reports that he has been smoking cigarettes. He has been smoking an average of .5 packs per day. He has never used smokeless tobacco. He reports current alcohol use. He reports current drug use. Drug: Marijuana.      Medications:  Prior to Admission medications    Medication Sig Start Date End Date Taking? Authorizing Provider   atorvastatin (LIPITOR) 10 MG tablet Take 1 tablet by mouth Daily. 7/29/23  Yes Saul Ahn MD   losartan (COZAAR) 100 MG tablet Take 1 tablet by mouth Daily.   Yes Saul Ahn MD   sertraline (ZOLOFT) 100 MG tablet Take 2 tablets by mouth Daily.   Yes Saul Ahn MD   warfarin (COUMADIN) 7.5 MG tablet Take 1 tablet by mouth Every Night. Tuesday, Thursday, Saturday, Corey 10/25/23  Yes Saul Ahn MD       Allergies:    Allergies   Allergen Reactions    Latex Other (See Comments)     Primary care provider        Objective   Objective     Vital Signs  Temp:  [97.6 °F (36.4 °C)-98.7 °F (37.1 °C)] 97.6 °F (36.4 °C)  Heart Rate:  [54-65] 58  Resp:  [16-21] 21  BP: (140-180)/(61-89) 180/76  SpO2:  [96 %-99 %] 98 %  on   ;   Device (Oxygen Therapy): room air  Body mass index is 21.25 kg/m².    Physical Exam  Vitals and nursing note reviewed.   Constitutional:       Appearance: Normal appearance. He is ill-appearing.   HENT:      Head: Normocephalic and atraumatic.      Right Ear: External ear normal.      Left Ear: External ear normal.      Nose: Nose normal.      Mouth/Throat:      Mouth: Mucous membranes are moist.   Eyes:      Extraocular Movements: Extraocular movements intact.   Cardiovascular:      Rate and Rhythm: Normal rate and regular rhythm.      Pulses: Normal pulses.      Heart sounds: Normal heart sounds.   Pulmonary:      Effort: Pulmonary effort is normal.      Breath sounds: Normal breath sounds.    Abdominal:      General: Abdomen is flat. Bowel sounds are normal.      Palpations: Abdomen is soft.      Tenderness: There is abdominal tenderness.   Musculoskeletal:         General: Normal range of motion.      Cervical back: Normal range of motion.   Skin:     General: Skin is warm.   Neurological:      Mental Status: He is alert and oriented to person, place, and time.   Psychiatric:         Behavior: Behavior normal.         Thought Content: Thought content normal.         Judgment: Judgment normal.     Results Review:  I have personally reviewed most recent lab results, microbiology results, and radiology images and interpretations and agree with findings, most notably: cbc, cmp, inr, ct abd.    Results from last 7 days   Lab Units 01/17/24  0358   WBC 10*3/mm3 17.20*   HEMOGLOBIN g/dL 16.8   HEMATOCRIT % 49.8   PLATELETS 10*3/mm3 222   INR  4.66*     Results from last 7 days   Lab Units 01/17/24  0358 01/16/24  0641   SODIUM mmol/L 139 140   POTASSIUM mmol/L 3.7 3.2*   CHLORIDE mmol/L 103 99   CO2 mmol/L 24.0 24.0   BUN mg/dL 8 14   CREATININE mg/dL 0.53* 0.77   GLUCOSE mg/dL 113* 171*   CALCIUM mg/dL 9.5 10.3   ALK PHOS U/L  --  61   ALT (SGPT) U/L  --  47*   AST (SGOT) U/L  --  35     Estimated Creatinine Clearance: 204.1 mL/min (A) (by C-G formula based on SCr of 0.53 mg/dL (L)).  Brief Urine Lab Results  (Last result in the past 365 days)        Color   Clarity   Blood   Leuk Est   Nitrite   Protein   CREAT   Urine HCG        01/16/24 0712 Yellow   Clear   Small (1+)   Negative   Negative   Trace                   Microbiology Results (last 10 days)       Procedure Component Value - Date/Time    Respiratory Panel PCR w/COVID-19(SARS-CoV-2) KATHERINE/GAUDENCIO/SARAH/PAD/COR/MICHAEL In-House, NP Swab in UTM/VTM, 2 HR TAT - Swab, Nasopharynx [[id number]]  (Normal) Collected: 01/16/24 0713    Lab Status: Final result Specimen: Swab from Nasopharynx Updated: 01/16/24 0821     ADENOVIRUS, PCR Not Detected     Coronavirus 229E  Not Detected     Coronavirus HKU1 Not Detected     Coronavirus NL63 Not Detected     Coronavirus OC43 Not Detected     COVID19 Not Detected     Human Metapneumovirus Not Detected     Human Rhinovirus/Enterovirus Not Detected     Influenza A PCR Not Detected     Influenza B PCR Not Detected     Parainfluenza Virus 1 Not Detected     Parainfluenza Virus 2 Not Detected     Parainfluenza Virus 3 Not Detected     Parainfluenza Virus 4 Not Detected     RSV, PCR Not Detected     Bordetella pertussis pcr Not Detected     Bordetella parapertussis PCR Not Detected     Chlamydophila pneumoniae PCR Not Detected     Mycoplasma pneumo by PCR Not Detected    Narrative:      In the setting of a positive respiratory panel with a viral infection PLUS a negative procalcitonin without other underlying concern for bacterial infection, consider observing off antibiotics or discontinuation of antibiotics and continue supportive care. If the respiratory panel is positive for atypical bacterial infection (Bordetella pertussis, Chlamydophila pneumoniae, or Mycoplasma pneumoniae), consider antibiotic de-escalation to target atypical bacterial infection.            ECG/EMG Results (most recent)       Procedure Component Value Units Date/Time    SCANNED - TELEMETRY   [[id number]] Resulted: 01/16/24     Updated: 01/16/24 1327    SCANNED - TELEMETRY   [[id number]] Resulted: 01/16/24     Updated: 01/17/24 0436    ECG 12 Lead QT Measurement [[id number]] Collected: 01/16/24 1001     Updated: 01/17/24 0622     QT Interval 685 ms      QTC Interval 628 ms     Narrative:      HEART RATE= 51  bpm  RR Interval= 1188  ms  IL Interval= 223  ms  P Horizontal Axis=   deg  P Front Axis= -48  deg  QRSD Interval= 152  ms  QT Interval= 685  ms  QTcB= 628  ms  QRS Axis= 100  deg  T Wave Axis= 90  deg  - ABNORMAL ECG -  Sinus or ectopic atrial bradycardia  Prolonged IL interval  Consider left ventricular hypertrophy  Abnormal T, probable ischemia, lateral  "leads  Prolonged QT interval  When compared with ECG of 16-Jan-2024 7:08:57,  Significant change in rhythm: previously sinus  New or worsened ischemia or infarction  Electronically Signed By: Jasbir Barrera ([facility]) 17-Jan-2024 06:21:52  Date and Time of Study: 2024-01-16 10:01:32    ECG 12 Lead Chest Pain [[id number]] Collected: 01/16/24 0708     Updated: 01/17/24 0637     QT Interval 626 ms      QTC Interval 593 ms     Narrative:      HEART RATE= 54  bpm  RR Interval= 1116  ms  AK Interval= 204  ms  P Horizontal Axis= -48  deg  P Front Axis= 3  deg  QRSD Interval= 144  ms  QT Interval= 626  ms  QTcB= 593  ms  QRS Axis= 101  deg  T Wave Axis= 85  deg  - ABNORMAL ECG -  Sinus bradycardia  Borderline prolonged AK interval  Consider left ventricular hypertrophy  Prolonged QT interval  No previous ECG available for comparison  Electronically Signed By: Jasbir Cooper ([facility] [place]) 17-Jan-2024 06:37:06  Date and Time of Study: 2024-01-16 07:08:57    SCANNED - TELEMETRY   [[id number]] Resulted: 01/16/24     Updated: 01/17/24 0757    SCANNED - TELEMETRY   [[id number]] Resulted: 01/16/24     Updated: 01/17/24 1151                    CT Angiogram Abdomen Pelvis    Result Date: 1/16/2024  Impression: 1. No evidence of aortoiliac aneurysm or stenosis. 2. Shotty, somewhat numerous lymph nodes in the small bowel mesentery, which may be reactive, possibly viral mesenteric lymphadenitis. 3. Fecalization of distal small bowel, with no visible obstruction. Short segment of distal ileum with numerous diverticuli. Although this is somewhat unusual, there is no visible inflammatory change to suggest small bowel diverticulitis. Consider follow-up scan if patient symptoms persist or worsen. 4. Questionable gallbladder \"sludge\". No visible gallbladder wall inflammation or evidence of biliary ductal dilatation. 5. No evidence of potential inflammatory focus, bowel obstruction, obstructive uropathy, or other acute disease is seen. Electronically Signed: " Cesar Bernal MD  1/16/2024 8:55 AM EST  Workstation ID: [id number]       Estimated Creatinine Clearance: 204.1 mL/min (A) (by C-G formula based on SCr of 0.53 mg/dL (L)).    Assessment & Plan   Assessment/Plan       Active Hospital Problems    Diagnosis  POA    **Vomiting [R11.10]  Yes      Resolved Hospital Problems   No resolved problems to display.     Vomiting and abdominal pain        Lab Results   Component Value Date     WBC 16.30 (H) 01/16/2024     AST 35 01/16/2024     ALT 47 (H) 01/16/2024     ALKPHOS 61 01/16/2024     BILITOT 0.5 01/16/2024     LIPASE 28 01/16/2024   -CMP showed potassium 3.2 this morning.  K. Dur x 2 ordered  -Lipase 28  -CBC showed WBC 16.38 and neutrophil 82.3  -UA negative for bacteria and nitrites  - rpp negative  -CT of CMP abdomen and pelvis: Showed no evidence of a aortoiliac aneurysm or stenosis.  No evidence of bowel obstruction.  Shotty lymph nodes in the small bowel suggesting possible viral mesenteric lymphadenitis   -In the ED patient received Bentyl, Benadryl, and iopamidol, morphine, Zofran, 1 L normal saline bolus  -Tylenol, Bentyl, Reglan, promethazine ordered  -EKG showed sinus bradycardia with heart rate of 51 and prolonged QT interval.  Will avoid Zofran  -Clear liquid diet, advance as tolerated  -GI consulted  -Monitor labs while admitted      Hypertension  -Poorly controlled       BP Readings from Last 1 Encounters:   01/16/24 173/81   - Continue Cozaar  - Monitor while admitted      Hyperlipidemia  -Continue Lipitor     Depression/anxiety  -Continue Zoloft     Elevated INR  -Patient reports he is on Coumadin for an artificial heart valve.  Does not recall which valve  -INR 4.51  -Hold Coumadin today  -Consult pharmacy to dose Coumadin  -Recheck INR in a.m.      VTE Prophylaxis -   Mechanical Order History:        Ordered        01/16/24 1056  Place Sequential Compression Device  Once            01/16/24 1056  Maintain Sequential Compression Device  Continuous                           Pharmalogical Order History:       None            CODE STATUS:    Code Status and Medical Interventions:   Ordered at: 01/16/24 1046     Level Of Support Discussed With:    Patient     Code Status (Patient has no pulse and is not breathing):    CPR (Attempt to Resuscitate)     Medical Interventions (Patient has pulse or is breathing):    Full Support       This patient has been examined wearing personal protective equipment.     I discussed the patient's findings and my recommendations with patient and nursing staff.      Signature:Electronically signed by Adri Crowley PA-C, 01/17/24, 3:00 PM EST.

## 2024-01-17 NOTE — PROGRESS NOTES
"Pharmacy dosing service  Anticoagulant  Warfarin     Subjective:    James Rodriguez is a 38 y.o.male being continued on warfarin for Aortic Valve Replacement.    INR Goal: 2 - 3  Home medication?: warfarin 7.5 mg PO daily  Bridge Therapy Present?:  No  Interacting Medications Evaluation (New/Present/Discontinued):   Additional Contributing Factors:       Assessment/Plan:    INR remains supratherapeutic. Will continue to hold.      Continue to monitor and adjust based on INR.         Date 1/16 1/17          INR 4.51 4.66          Dose HOLD HOLD               Objective:  [Ht: 190.5 cm (75\"); Wt: 77.1 kg (169 lb 15.6 oz); BMI: Body mass index is 21.25 kg/m².]    Lab Results   Component Value Date    ALBUMIN 4.8 01/16/2024     Lab Results   Component Value Date    INR 4.66 (H) 01/17/2024    INR 4.51 (H) 01/16/2024    INR 1.6 12/29/2022    PROTIME 45.3 (H) 01/17/2024    PROTIME 44.0 (H) 01/16/2024    PROTIME 16.2 (H) 12/29/2022     Lab Results   Component Value Date    HGB 16.8 01/17/2024    HGB 17.1 01/16/2024     Lab Results   Component Value Date    HCT 49.8 01/17/2024    HCT 50.7 01/16/2024       Alexia Zavala, PharmD  01/17/24 15:46 EST         "

## 2024-01-18 ENCOUNTER — INPATIENT HOSPITAL (OUTPATIENT)
Dept: URBAN - METROPOLITAN AREA HOSPITAL 84 | Facility: HOSPITAL | Age: 39
End: 2024-01-18
Payer: MEDICAID

## 2024-01-18 DIAGNOSIS — R93.3 ABNORMAL FINDINGS ON DIAGNOSTIC IMAGING OF OTHER PARTS OF DI: ICD-10-CM

## 2024-01-18 DIAGNOSIS — K59.00 CONSTIPATION, UNSPECIFIED: ICD-10-CM

## 2024-01-18 DIAGNOSIS — K80.20 CALCULUS OF GALLBLADDER WITHOUT CHOLECYSTITIS WITHOUT OBSTRU: ICD-10-CM

## 2024-01-18 DIAGNOSIS — R94.5 ABNORMAL RESULTS OF LIVER FUNCTION STUDIES: ICD-10-CM

## 2024-01-18 DIAGNOSIS — Z79.01 LONG TERM (CURRENT) USE OF ANTICOAGULANTS: ICD-10-CM

## 2024-01-18 DIAGNOSIS — D72.829 ELEVATED WHITE BLOOD CELL COUNT, UNSPECIFIED: ICD-10-CM

## 2024-01-18 DIAGNOSIS — R10.11 RIGHT UPPER QUADRANT PAIN: ICD-10-CM

## 2024-01-18 LAB
ALBUMIN SERPL-MCNC: 4.3 G/DL (ref 3.5–5.2)
ALBUMIN/GLOB SERPL: 1.4 G/DL
ALP SERPL-CCNC: 67 U/L (ref 39–117)
ALT SERPL W P-5'-P-CCNC: 36 U/L (ref 1–41)
ANION GAP SERPL CALCULATED.3IONS-SCNC: 9 MMOL/L (ref 5–15)
AST SERPL-CCNC: 33 U/L (ref 1–40)
BASOPHILS # BLD AUTO: 0.1 10*3/MM3 (ref 0–0.2)
BASOPHILS NFR BLD AUTO: 0.3 % (ref 0–1.5)
BILIRUB SERPL-MCNC: 1.1 MG/DL (ref 0–1.2)
BUN SERPL-MCNC: 8 MG/DL (ref 6–20)
BUN/CREAT SERPL: 14 (ref 7–25)
CALCIUM SPEC-SCNC: 9.4 MG/DL (ref 8.6–10.5)
CHLORIDE SERPL-SCNC: 100 MMOL/L (ref 98–107)
CO2 SERPL-SCNC: 26 MMOL/L (ref 22–29)
CREAT SERPL-MCNC: 0.57 MG/DL (ref 0.76–1.27)
DEPRECATED RDW RBC AUTO: 42.4 FL (ref 37–54)
EGFRCR SERPLBLD CKD-EPI 2021: 127.9 ML/MIN/1.73
EOSINOPHIL # BLD AUTO: 0 10*3/MM3 (ref 0–0.4)
EOSINOPHIL NFR BLD AUTO: 0.1 % (ref 0.3–6.2)
ERYTHROCYTE [DISTWIDTH] IN BLOOD BY AUTOMATED COUNT: 13.9 % (ref 12.3–15.4)
GLOBULIN UR ELPH-MCNC: 3 GM/DL
GLUCOSE SERPL-MCNC: 109 MG/DL (ref 65–99)
HCT VFR BLD AUTO: 50.2 % (ref 37.5–51)
HGB BLD-MCNC: 17 G/DL (ref 13–17.7)
INR PPP: 5.45 (ref 2–3)
LIPASE SERPL-CCNC: 31 U/L (ref 13–60)
LYMPHOCYTES # BLD AUTO: 1.3 10*3/MM3 (ref 0.7–3.1)
LYMPHOCYTES NFR BLD AUTO: 6.8 % (ref 19.6–45.3)
MCH RBC QN AUTO: 30.3 PG (ref 26.6–33)
MCHC RBC AUTO-ENTMCNC: 33.9 G/DL (ref 31.5–35.7)
MCV RBC AUTO: 89.2 FL (ref 79–97)
MITOCHONDRIA M2 IGG SER-ACNC: <20 UNITS (ref 0–20)
MONOCYTES # BLD AUTO: 2 10*3/MM3 (ref 0.1–0.9)
MONOCYTES NFR BLD AUTO: 10.6 % (ref 5–12)
NEUTROPHILS NFR BLD AUTO: 15.7 10*3/MM3 (ref 1.7–7)
NEUTROPHILS NFR BLD AUTO: 82.2 % (ref 42.7–76)
NRBC BLD AUTO-RTO: 0.1 /100 WBC (ref 0–0.2)
PLATELET # BLD AUTO: 218 10*3/MM3 (ref 140–450)
PMV BLD AUTO: 10.2 FL (ref 6–12)
POTASSIUM SERPL-SCNC: 3.6 MMOL/L (ref 3.5–5.2)
PROT SERPL-MCNC: 7.3 G/DL (ref 6–8.5)
PROTHROMBIN TIME: 52.4 SECONDS (ref 19.4–28.5)
RBC # BLD AUTO: 5.63 10*6/MM3 (ref 4.14–5.8)
SMA IGG SER-ACNC: 12 UNITS (ref 0–19)
SODIUM SERPL-SCNC: 135 MMOL/L (ref 136–145)
WBC NRBC COR # BLD AUTO: 19.1 10*3/MM3 (ref 3.4–10.8)

## 2024-01-18 PROCEDURE — G0378 HOSPITAL OBSERVATION PER HR: HCPCS

## 2024-01-18 PROCEDURE — 25010000002 METOCLOPRAMIDE PER 10 MG: Performed by: NURSE PRACTITIONER

## 2024-01-18 PROCEDURE — 25810000003 SODIUM CHLORIDE 0.9 % SOLUTION: Performed by: NURSE PRACTITIONER

## 2024-01-18 PROCEDURE — 83690 ASSAY OF LIPASE: CPT | Performed by: PHYSICIAN ASSISTANT

## 2024-01-18 PROCEDURE — 99204 OFFICE O/P NEW MOD 45 MIN: CPT | Performed by: SURGERY

## 2024-01-18 PROCEDURE — 85610 PROTHROMBIN TIME: CPT | Performed by: NURSE PRACTITIONER

## 2024-01-18 PROCEDURE — 80053 COMPREHEN METABOLIC PANEL: CPT | Performed by: NURSE PRACTITIONER

## 2024-01-18 PROCEDURE — 99232 SBSQ HOSP IP/OBS MODERATE 35: CPT | Mod: FS | Performed by: NURSE PRACTITIONER

## 2024-01-18 PROCEDURE — 85025 COMPLETE CBC W/AUTO DIFF WBC: CPT | Performed by: NURSE PRACTITIONER

## 2024-01-18 RX ADMIN — SERTRALINE 200 MG: 100 TABLET, FILM COATED ORAL at 20:22

## 2024-01-18 RX ADMIN — Medication 10 ML: at 08:01

## 2024-01-18 RX ADMIN — METOCLOPRAMIDE 10 MG: 5 INJECTION, SOLUTION INTRAMUSCULAR; INTRAVENOUS at 08:02

## 2024-01-18 RX ADMIN — POLYETHYLENE GLYCOL 3350 17 G: 17 POWDER, FOR SOLUTION ORAL at 08:01

## 2024-01-18 RX ADMIN — PANTOPRAZOLE SODIUM 40 MG: 40 INJECTION, POWDER, LYOPHILIZED, FOR SOLUTION INTRAVENOUS at 08:01

## 2024-01-18 RX ADMIN — SODIUM CHLORIDE 125 ML/HR: 9 INJECTION, SOLUTION INTRAVENOUS at 01:12

## 2024-01-18 RX ADMIN — PANTOPRAZOLE SODIUM 40 MG: 40 INJECTION, POWDER, LYOPHILIZED, FOR SOLUTION INTRAVENOUS at 16:31

## 2024-01-18 RX ADMIN — DOCUSATE SODIUM AND SENNOSIDES 2 TABLET: 8.6; 5 TABLET, FILM COATED ORAL at 08:01

## 2024-01-18 RX ADMIN — ATORVASTATIN CALCIUM 10 MG: 10 TABLET, FILM COATED ORAL at 20:22

## 2024-01-18 RX ADMIN — ACETAMINOPHEN 650 MG: 325 TABLET, FILM COATED ORAL at 08:02

## 2024-01-18 RX ADMIN — SODIUM CHLORIDE 125 ML/HR: 9 INJECTION, SOLUTION INTRAVENOUS at 16:35

## 2024-01-18 RX ADMIN — ACETAMINOPHEN 650 MG: 325 TABLET, FILM COATED ORAL at 20:22

## 2024-01-18 NOTE — PLAN OF CARE
Problem: Adult Inpatient Plan of Care  Goal: Optimal Comfort and Wellbeing  Intervention: Monitor Pain and Promote Comfort  Recent Flowsheet Documentation  Taken 1/17/2024 2147 by Hannah Watkins RN  Pain Management Interventions: see MAR  Taken 1/17/2024 2117 by Hannah Watkins RN  Pain Management Interventions: see MAR  Taken 1/17/2024 2000 by Hannah Watkins RN  Pain Management Interventions: see MAR   Goal Outcome Evaluation:  Plan of Care Reviewed With: patient        Progress: no change  Outcome Evaluation: Pt admitted for vomiting, abdominal discomfort and constipation. Scheduled stool softener and laxative given. Pt maintained on clear liquids. Abdominal discomfort managed by dicyclomine IM PRN as ordered. Ordered IV fluids maintained. Care plan continued.           Problem: Pain Acute  Goal: Acceptable Pain Control and Functional Ability  Intervention: Develop Pain Management Plan  Recent Flowsheet Documentation  Taken 1/17/2024 2147 by Hannah Watkins RN  Pain Management Interventions: see MAR  Taken 1/17/2024 2117 by Hannah Watkins RN  Pain Management Interventions: see MAR  Taken 1/17/2024 2000 by Hannah Watkins RN  Pain Management Interventions: see MAR

## 2024-01-18 NOTE — PROGRESS NOTES
LOS: 0 days   Patient Care Team:  Wally Henderson MD as PCP - General (Internal Medicine)      Subjective     Interval History:     Subjective: Patient reports that nausea/vomiting has resolved.  Continues to have right upper quadrant pain.      ROS:   No chest pain, shortness of breath, or cough.        Medication Review:     Current Facility-Administered Medications:     acetaminophen (TYLENOL) tablet 650 mg, 650 mg, Oral, Q4H PRN, 650 mg at 01/18/24 0802 **OR** acetaminophen (TYLENOL) 160 MG/5ML oral solution 650 mg, 650 mg, Oral, Q4H PRN **OR** acetaminophen (TYLENOL) suppository 650 mg, 650 mg, Rectal, Q4H PRN, Tripure, Neisha S, APRN    aluminum-magnesium hydroxide-simethicone (MAALOX MAX) 400-400-40 MG/5ML suspension 15 mL, 15 mL, Oral, Q6H PRN, Tripure, Neisha S, APRN, 15 mL at 01/17/24 0926    atorvastatin (LIPITOR) tablet 10 mg, 10 mg, Oral, Nightly, Tripure, Neisha S, APRN, 10 mg at 01/17/24 2116    sennosides-docusate (PERICOLACE) 8.6-50 MG per tablet 2 tablet, 2 tablet, Oral, BID, 2 tablet at 01/18/24 0801 **AND** polyethylene glycol (MIRALAX) packet 17 g, 17 g, Oral, Daily PRN **AND** bisacodyl (DULCOLAX) EC tablet 5 mg, 5 mg, Oral, Daily PRN **AND** bisacodyl (DULCOLAX) suppository 10 mg, 10 mg, Rectal, Daily PRN, Tripure, Neisha S, APRN    dicyclomine (BENTYL) injection 20 mg, 20 mg, Intramuscular, 4x Daily PRN, Tripure, Neisha S, APRN, 20 mg at 01/17/24 2117    losartan (COZAAR) tablet 100 mg, 100 mg, Oral, Daily, Tripure, Neisha S, APRN, 100 mg at 01/17/24 2117    metoclopramide (REGLAN) injection 10 mg, 10 mg, Intravenous, Q6H PRN, Tripure, Neisha S, APRN, 10 mg at 01/18/24 0802    nitroglycerin (NITROSTAT) SL tablet 0.4 mg, 0.4 mg, Sublingual, Q5 Min PRN, Neisha Cha, APRN    ondansetron (ZOFRAN) injection 4 mg, 4 mg, Intravenous, Q6H PRN, Adir Crowley PA-C    pantoprazole (PROTONIX) injection 40 mg, 40 mg, Intravenous, BID AC, Lizeth Recinos APRN, 40 mg at  01/18/24 0801    Pharmacy Consult - Pharmacy to dose, , Does not apply, Continuous PRN, Tripure, Neisha S, APRN    polyethylene glycol (MIRALAX) packet 17 g, 17 g, Oral, BID, Lizeth Recinos, APRN, 17 g at 01/18/24 0801    sertraline (ZOLOFT) tablet 200 mg, 200 mg, Oral, Daily, Tripure, Neisha S, APRN, 200 mg at 01/17/24 2117    [COMPLETED] Insert Peripheral IV, , , Once **AND** sodium chloride 0.9 % flush 10 mL, 10 mL, Intravenous, PRN, Ajay Cabralia E, APRN    sodium chloride 0.9 % flush 10 mL, 10 mL, Intravenous, Q12H, Tripure, Neisha S, APRN, 10 mL at 01/18/24 0801    sodium chloride 0.9 % flush 10 mL, 10 mL, Intravenous, PRN, Tripure, Neisha S, APRN    sodium chloride 0.9 % infusion 40 mL, 40 mL, Intravenous, PRN, Tripure, Neisha S, APRN    sodium chloride 0.9 % infusion, 125 mL/hr, Intravenous, Continuous, Ajay Cabralia E, APRN, Last Rate: 125 mL/hr at 01/18/24 0112, 125 mL/hr at 01/18/24 0112      Objective     Vital Signs  Vitals:    01/17/24 2247 01/18/24 0218 01/18/24 0625 01/18/24 1115   BP: 133/73 129/68 114/64 130/71   BP Location: Right arm Left arm Right arm Right arm   Patient Position: Lying Lying Lying Lying   Pulse: 76 72 64 61   Resp: 20 22 26 16   Temp: 98.4 °F (36.9 °C) 97.8 °F (36.6 °C) 97.4 °F (36.3 °C) 98 °F (36.7 °C)   TempSrc: Temporal Temporal Temporal Oral   SpO2: 98%   99%   Weight:       Height:           Physical Exam:     General Appearance:    Awake and alert, in no acute distress   Head:    Normocephalic, without obvious abnormality   Eyes:          Conjunctivae normal, anicteric sclera   Throat:   No oral lesions, no thrush, oral mucosa moist   Neck:   No adenopathy, supple, no JVD   Lungs:     respirations regular, even and unlabored   Abdomen:     Soft, RUQ tenderness, no rebound or guarding, non-distended   Rectal:     Deferred   Extremities:   No edema, no cyanosis   Skin:   No bruising or rash, no jaundice        Results Review:    CBC    Results from last 7  days   Lab Units 01/18/24  0345 01/17/24  0358 01/16/24  0641   WBC 10*3/mm3 19.10* 17.20* 16.30*   HEMOGLOBIN g/dL 17.0 16.8 17.1   PLATELETS 10*3/mm3 218 222 223     CMP   Results from last 7 days   Lab Units 01/18/24  0345 01/17/24  0358 01/16/24  0641   SODIUM mmol/L 135* 139 140   POTASSIUM mmol/L 3.6 3.7 3.2*   CHLORIDE mmol/L 100 103 99   CO2 mmol/L 26.0 24.0 24.0   BUN mg/dL 8 8 14   CREATININE mg/dL 0.57* 0.53* 0.77   GLUCOSE mg/dL 109* 113* 171*   ALBUMIN g/dL 4.3  --  4.8   BILIRUBIN mg/dL 1.1  --  0.5   ALK PHOS U/L 67  --  61   AST (SGOT) U/L 33  --  35   ALT (SGPT) U/L 36  --  47*   MAGNESIUM mg/dL  --   --  1.7   LIPASE U/L 31  --  28     Cr Clearance Estimated Creatinine Clearance: 189.7 mL/min (A) (by C-G formula based on SCr of 0.57 mg/dL (L)).  Coag   Results from last 7 days   Lab Units 01/18/24  0345 01/17/24  0358 01/16/24  0641   INR  5.45* 4.66* 4.51*     HbA1C   Lab Results   Component Value Date    HGBA1C 5.50 01/16/2024         Infection     UA    Results from last 7 days   Lab Units 01/16/24  0712   NITRITE UA  Negative   WBC UA /HPF 0-2   BACTERIA UA /HPF None Seen   SQUAM EPITHEL UA /HPF 0-2     Microbiology Results (last 10 days)       Procedure Component Value - Date/Time    Gastrointestinal Panel, PCR - Stool, Per Rectum [[id number]]  (Normal) Collected: 01/17/24 1329    Lab Status: Final result Specimen: Stool from Per Rectum Updated: 01/17/24 1501     Campylobacter Not Detected     Plesiomonas shigelloides Not Detected     Salmonella Not Detected     Vibrio Not Detected     Vibrio cholerae Not Detected     Yersinia enterocolitica Not Detected     Enteroaggregative E. coli (EAEC) Not Detected     Enteropathogenic E. coli (EPEC) Not Detected     Enterotoxigenic E. coli (ETEC) lt/st Not Detected     Shiga-like toxin-producing E. coli (STEC) stx1/stx2 Not Detected     Shigella/Enteroinvasive E. coli (EIEC) Not Detected     Cryptosporidium Not Detected     Cyclospora cayetanensis Not  Detected     Entamoeba histolytica Not Detected     Giardia lamblia Not Detected     Adenovirus F40/41 Not Detected     Astrovirus Not Detected     Norovirus GI/GII Not Detected     Rotavirus A Not Detected     Sapovirus (I, II, IV or V) Not Detected    Narrative:      If Aeromonas, Staphylococcus aureus or Bacillus cereus are suspected, please order [id number]: Stool Culture, Aeromonas, S aureus, B Cereus.    Respiratory Panel PCR w/COVID-19(SARS-CoV-2) KATHERINE/GAUDENCIO/SARAH/PAD/COR/MICHAEL In-House, NP Swab in UTM/VTM, 2 HR TAT - Swab, Nasopharynx [[id number]]  (Normal) Collected: 01/16/24 0713    Lab Status: Final result Specimen: Swab from Nasopharynx Updated: 01/16/24 0821     ADENOVIRUS, PCR Not Detected     Coronavirus 229E Not Detected     Coronavirus HKU1 Not Detected     Coronavirus NL63 Not Detected     Coronavirus OC43 Not Detected     COVID19 Not Detected     Human Metapneumovirus Not Detected     Human Rhinovirus/Enterovirus Not Detected     Influenza A PCR Not Detected     Influenza B PCR Not Detected     Parainfluenza Virus 1 Not Detected     Parainfluenza Virus 2 Not Detected     Parainfluenza Virus 3 Not Detected     Parainfluenza Virus 4 Not Detected     RSV, PCR Not Detected     Bordetella pertussis pcr Not Detected     Bordetella parapertussis PCR Not Detected     Chlamydophila pneumoniae PCR Not Detected     Mycoplasma pneumo by PCR Not Detected    Narrative:      In the setting of a positive respiratory panel with a viral infection PLUS a negative procalcitonin without other underlying concern for bacterial infection, consider observing off antibiotics or discontinuation of antibiotics and continue supportive care. If the respiratory panel is positive for atypical bacterial infection (Bordetella pertussis, Chlamydophila pneumoniae, or Mycoplasma pneumoniae), consider antibiotic de-escalation to target atypical bacterial infection.          Imaging Results (Last 72 Hours)       Procedure Component Value Units  Date/Time    US Liver [[id number]] Collected: 01/17/24 1553     Updated: 01/17/24 1601    Narrative:      US LIVER    Date of Exam: 1/17/2024 3:47 PM EST    Indication: elevated LFTs.    Comparison: No comparisons available.    Technique: Grayscale and color Doppler ultrasound evaluation of the right upper quadrant was performed.      Findings:  LIVER:  The liver appears normal in echogenicity.the liver measures 17.4 cm in length. No focal lesions identified. Normal flow is present within the hepatic vasculature.     GALLBLADDER: The gallbladder contains sludge and stones and the wall is thickened up to 1.3 cm. There is mild pericholecystic fluid. The common bile duct is normal.    PANCREAS:  Visualized portions are unremarkable.    RIGHT KIDNEY:  Normal in size with no focal lesions. No evidence of nephrolithiasis or hydronephrosis.          Impression:      Impression:  Cholelithiasis with gallbladder sludge. There is gallbladder wall thickening with mild pericholecystic fluid. These findings raise the possibility of cholecystitis. Clinical and laboratory correlation are recommended.        Electronically Signed: Cherrie Amato MD    1/17/2024 3:56 PM EST    Workstation ID: [id number]    CT Angiogram Abdomen Pelvis [[id number]] Collected: 01/16/24 0839     Updated: 01/16/24 0858    Narrative:      CT ANGIOGRAM ABDOMEN PELVIS    Date of Exam: 1/16/2024 7:59 AM EST    Indication: abd pain radiating to back; hx marfans.    Comparison: None available.    Technique: CTA of the abdomen and pelvis was performed before and after the uneventful intravenous administration of iodinated contrast. Reconstructed coronal and sagittal images were also obtained. In addition, a 3-D volume rendered image was created   for interpretation. Automated exposure control and iterative reconstruction methods were used.      Findings:  The included lower lungs appear clear. There appears to be a small hiatal hernia. Radiodense material in the  "hernia in the stomach likely represent pill fragments. There is diffuse fatty liver change. Gallbladder is distended typical of fasting state,   and appears slightly heterogeneous and hyperdense, perhaps due to gallbladder \"sludge\". Spleen is not enlarged. No significant abnormalities are seen in the pancreas or adrenal glands. Kidneys appear unremarkable except for small cysts.     No upper abdominal free air or ascites is seen. Better appreciated on coronal images, specifically images 25 through 39, are shotty small bowel mesenteric lymph nodes a little more numerous than typically seen, possibly reactive, as may be seen with   mesenteric lymphadenitis.    Regarding the lower abdomen and pelvis, the cecum and appendix appear normal. There is some fecalization of the distal ileum, and on axial images 68 through 77, what appear to be numerous diverticuli that appear to be arising from a redundant loop of   ileum, rather than a redundant loop of colon. There is, however, no evidence of any associated inflammation, and no similar findings elsewhere.    There appears to be a mild degree of fecal stasis. No impaction is seen. No bowel wall inflammatory changes are identified. Bladder is moderately distended and normal in appearance. Prostate is not enlarged. Note is made of previous left groin surgery,   and also some fatty atrophic change of the left gluteus edwin.    The lower thoracic aorta abdominal aorta, iliac and proximal superficial femoral vessels are normal in caliber with no evidence of aneurysm, dissection or significant stenosis. Celiac axis SMA, and renal arteries are normal in caliber.        Impression:      Impression:    1. No evidence of aortoiliac aneurysm or stenosis.    2. Shotty, somewhat numerous lymph nodes in the small bowel mesentery, which may be reactive, possibly viral mesenteric lymphadenitis.    3. Fecalization of distal small bowel, with no visible obstruction. Short segment of distal " "ileum with numerous diverticuli. Although this is somewhat unusual, there is no visible inflammatory change to suggest small bowel diverticulitis. Consider   follow-up scan if patient symptoms persist or worsen.    4. Questionable gallbladder \"sludge\". No visible gallbladder wall inflammation or evidence of biliary ductal dilatation.    5. No evidence of potential inflammatory focus, bowel obstruction, obstructive uropathy, or other acute disease is seen.        Electronically Signed: Cesar Bernal MD    1/16/2024 8:55 AM EST    Workstation ID: [id number]            Assessment & Plan     ASSESSMENT:  -Epigastric pain  -Nausea/vomiting  -Abnormal CTA showing possible viral mesenteric lymphadenitis along with fecalization of the distal small bowel  -Mildly elevated LFTs  -Coumadin toxicity  -Leukocytosis  -History of valve replacement on Coumadin  -Marfan syndrome  -Hypertension  -Hyperlipidemia  -History of abdominal surgery as a baby     PLAN:  Patient is a 38-year-old male with history of Marfan syndrome, abdominal surgery as an infant, and valve replacement on Coumadin who presented on 1/16 with complaints of abdominal pain and nausea/vomiting. CTA on admission shows no evidence of aneurysm or stenosis, possible viral mesenteric lymphadenitis, fecalization of the distal small bowel, and possible gallbladder sludge.     Patient reports that nausea/vomiting has resolved.  Tolerating clear liquids without difficulty.  Continues to have severe RUQ pain.  LFTs have completely normalized.  Lipase also normal.  RUQ US shows cholelithiasis with gallbladder sludge.  Gallbladder wall thickening is noted raising the possibility of cholecystitis.  General surgery has been consulted.  Await input.  Unfortunately, the patient remains Coumadin toxic with an INR of 5.45.  Therefore, highly unlikely that any procedures will be done today.  He does request to advance his diet.  Will plan low-fat diet.  Continue to hold Coumadin.  No " plans for EGD at this time.  Continue PPI.  Continue supportive care.      Electronically signed by STEVE Summers, 01/18/24, 2:19 PM EST.

## 2024-01-18 NOTE — CASE MANAGEMENT/SOCIAL WORK
Continued Stay Note  [facility]     Patient Name: James Rodriguez  MRN: [id number]  Today's Date: 1/18/2024    Admit Date: 1/16/2024    Plan: Return home with S.O   Discharge Plan       Row Name 01/18/24 1406       Plan    Plan Return home with S.O    Patient/Family in Agreement with Plan yes    Plan Comments Barriers: Nausea and vomiting. General Surgery consult  pending for Possible cholecystitis. CM will continue to follow                        Phone communication or documentation only - no physical contact with patient or family.   Ana COOPER,RN Case Manager  [facility]  Phone: Desk- [phone] cell- [phone]

## 2024-01-18 NOTE — PROGRESS NOTES
[facility] Observation Unit H&P    Patient Name: James Rodriguez  : 1985  MRN: [id number]  Primary Care Physician: Wally Henderson MD  Date of admission: 2024     Patient Care Team:  Wally Henderson MD as PCP - General (Internal Medicine)          Subjective   History Present Illness     Chief Complaint:   Chief Complaint   Patient presents with    Illness     Abdominal pain    History of Present Illness    ED 2024   Context: 38-year-old male past medical history significant for Marfan's on warfarin who presents with his girlfriend with complaints of abdominal pain/epigastric pain radiating into his back with nausea vomiting and diarrhea.  Was seen in urgent care on  and states his cough and congestion has improved.  Girlfriend has also had GI symptoms of nausea vomiting and diarrhea as well.  He denies any fever or urinary complaints.  Denies any associated diaphoresis chest heaviness or pressure.  States he gets his INR checked every 3 months.     Observation 2024  Patient agrees with HPI noted above including generalized abdominal pain, nausea, vomiting and diarrhea for the past 2 days.  He reports that girlfriend had similar symptoms 2 days prior.  He denies any food triggers or recent travel.  Reports abdominal pain 8/10.  He states that he is on Coumadin for an artificial valve replacement but he cannot recall which valve.       Observation 24  Pt still having abdominal cramping, nausea. GI consulted.     Observation 24  Pt inr elevated and holding coumadin. GI following pt. Us ruq shows possible cholecystitis. General surgery consulted. Will transfer pt to hospitalist care today.       ROS    Constitutional: Positive for malaise/fatigue. Negative for chills and fever.   Gastrointestinal:  Positive for abdominal pain, diarrhea, nausea and vomiting. Negative for melena.   All other systems reviewed and are negative.    Personal History     Past Medical  History:   Past Medical History:   Diagnosis Date    Hyperlipidemia     Hypertension     Marfan's disease        Surgical History:    History reviewed. No pertinent surgical history.        Family History: family history is not on file. Otherwise pertinent FHx was reviewed and unremarkable.     Social History:  reports that he has been smoking cigarettes. He has been smoking an average of .5 packs per day. He has never used smokeless tobacco. He reports current alcohol use. He reports current drug use. Drug: Marijuana.      Medications:  Prior to Admission medications    Medication Sig Start Date End Date Taking? Authorizing Provider   atorvastatin (LIPITOR) 10 MG tablet Take 1 tablet by mouth Daily. 7/29/23  Yes Saul Ahn MD   losartan (COZAAR) 100 MG tablet Take 1 tablet by mouth Daily.   Yes Saul Ahn MD   sertraline (ZOLOFT) 100 MG tablet Take 2 tablets by mouth Daily.   Yes Saul Ahn MD   warfarin (COUMADIN) 7.5 MG tablet Take 1 tablet by mouth Every Night. Tuesday, Thursday, Saturday, Corey 10/25/23  Yes Saul Ahn MD       Allergies:    Allergies   Allergen Reactions    Latex Other (See Comments)     Primary care provider        Objective   Objective     Vital Signs  Temp:  [97.4 °F (36.3 °C)-98.4 °F (36.9 °C)] 98 °F (36.7 °C)  Heart Rate:  [61-76] 61  Resp:  [16-26] 16  BP: (114-153)/(64-75) 130/71  SpO2:  [98 %-99 %] 99 %  on   ;   Device (Oxygen Therapy): room air  Body mass index is 21.25 kg/m².    Physical Exam    Vitals and nursing note reviewed.   Constitutional:       Appearance: Normal appearance. He is ill-appearing.   HENT:      Head: Normocephalic and atraumatic.      Right Ear: External ear normal.      Left Ear: External ear normal.      Nose: Nose normal.      Mouth/Throat:      Mouth: Mucous membranes are moist.   Eyes:      Extraocular Movements: Extraocular movements intact.   Cardiovascular:      Rate and Rhythm: Normal rate and regular  rhythm.      Pulses: Normal pulses.      Heart sounds: Normal heart sounds.   Pulmonary:      Effort: Pulmonary effort is normal.      Breath sounds: Normal breath sounds.   Abdominal:      General: Abdomen is flat. Bowel sounds are normal.      Palpations: Abdomen is soft.      Tenderness: There is abdominal tenderness.   Musculoskeletal:         General: Normal range of motion.      Cervical back: Normal range of motion.   Skin:     General: Skin is warm.   Neurological:      Mental Status: He is alert and oriented to person, place, and time.   Psychiatric:         Behavior: Behavior normal.         Thought Content: Thought content normal.         Judgment: Judgment normal.       Results Review:  I have personally reviewed most recent lab results, microbiology results, and radiology images and interpretations and agree with findings, most notably: cbc, cmp, ir, ct abd, us ruq, lipase, hep panel.    Results from last 7 days   Lab Units 01/18/24  0345   WBC 10*3/mm3 19.10*   HEMOGLOBIN g/dL 17.0   HEMATOCRIT % 50.2   PLATELETS 10*3/mm3 218   INR  5.45*     Results from last 7 days   Lab Units 01/18/24  0345   SODIUM mmol/L 135*   POTASSIUM mmol/L 3.6   CHLORIDE mmol/L 100   CO2 mmol/L 26.0   BUN mg/dL 8   CREATININE mg/dL 0.57*   GLUCOSE mg/dL 109*   CALCIUM mg/dL 9.4   ALK PHOS U/L 67   ALT (SGPT) U/L 36   AST (SGOT) U/L 33     Estimated Creatinine Clearance: 189.7 mL/min (A) (by C-G formula based on SCr of 0.57 mg/dL (L)).  Brief Urine Lab Results  (Last result in the past 365 days)        Color   Clarity   Blood   Leuk Est   Nitrite   Protein   CREAT   Urine HCG        01/16/24 0712 Yellow   Clear   Small (1+)   Negative   Negative   Trace                   Microbiology Results (last 10 days)       Procedure Component Value - Date/Time    Gastrointestinal Panel, PCR - Stool, Per Rectum [[id number]]  (Normal) Collected: 01/17/24 1329    Lab Status: Final result Specimen: Stool from Per Rectum Updated: 01/17/24 0411      Campylobacter Not Detected     Plesiomonas shigelloides Not Detected     Salmonella Not Detected     Vibrio Not Detected     Vibrio cholerae Not Detected     Yersinia enterocolitica Not Detected     Enteroaggregative E. coli (EAEC) Not Detected     Enteropathogenic E. coli (EPEC) Not Detected     Enterotoxigenic E. coli (ETEC) lt/st Not Detected     Shiga-like toxin-producing E. coli (STEC) stx1/stx2 Not Detected     Shigella/Enteroinvasive E. coli (EIEC) Not Detected     Cryptosporidium Not Detected     Cyclospora cayetanensis Not Detected     Entamoeba histolytica Not Detected     Giardia lamblia Not Detected     Adenovirus F40/41 Not Detected     Astrovirus Not Detected     Norovirus GI/GII Not Detected     Rotavirus A Not Detected     Sapovirus (I, II, IV or V) Not Detected    Narrative:      If Aeromonas, Staphylococcus aureus or Bacillus cereus are suspected, please order [id number]: Stool Culture, Aeromonas, S aureus, B Cereus.    Respiratory Panel PCR w/COVID-19(SARS-CoV-2) KATHERINE/GAUDENCIO/SARAH/PAD/COR/MICHAEL In-House, NP Swab in UTM/VTM, 2 HR TAT - Swab, Nasopharynx [[id number]]  (Normal) Collected: 01/16/24 0713    Lab Status: Final result Specimen: Swab from Nasopharynx Updated: 01/16/24 0821     ADENOVIRUS, PCR Not Detected     Coronavirus 229E Not Detected     Coronavirus HKU1 Not Detected     Coronavirus NL63 Not Detected     Coronavirus OC43 Not Detected     COVID19 Not Detected     Human Metapneumovirus Not Detected     Human Rhinovirus/Enterovirus Not Detected     Influenza A PCR Not Detected     Influenza B PCR Not Detected     Parainfluenza Virus 1 Not Detected     Parainfluenza Virus 2 Not Detected     Parainfluenza Virus 3 Not Detected     Parainfluenza Virus 4 Not Detected     RSV, PCR Not Detected     Bordetella pertussis pcr Not Detected     Bordetella parapertussis PCR Not Detected     Chlamydophila pneumoniae PCR Not Detected     Mycoplasma pneumo by PCR Not Detected    Narrative:      In the setting  of a positive respiratory panel with a viral infection PLUS a negative procalcitonin without other underlying concern for bacterial infection, consider observing off antibiotics or discontinuation of antibiotics and continue supportive care. If the respiratory panel is positive for atypical bacterial infection (Bordetella pertussis, Chlamydophila pneumoniae, or Mycoplasma pneumoniae), consider antibiotic de-escalation to target atypical bacterial infection.            ECG/EMG Results (most recent)       Procedure Component Value Units Date/Time    SCANNED - TELEMETRY   [[id number]] Resulted: 01/16/24     Updated: 01/16/24 1327    SCANNED - TELEMETRY   [[id number]] Resulted: 01/16/24     Updated: 01/17/24 0436    ECG 12 Lead QT Measurement [[id number]] Collected: 01/16/24 1001     Updated: 01/17/24 0622     QT Interval 685 ms      QTC Interval 628 ms     Narrative:      HEART RATE= 51  bpm  RR Interval= 1188  ms  AK Interval= 223  ms  P Horizontal Axis=   deg  P Front Axis= -48  deg  QRSD Interval= 152  ms  QT Interval= 685  ms  QTcB= 628  ms  QRS Axis= 100  deg  T Wave Axis= 90  deg  - ABNORMAL ECG -  Sinus or ectopic atrial bradycardia  Prolonged AK interval  Consider left ventricular hypertrophy  Abnormal T, probable ischemia, lateral leads  Prolonged QT interval  When compared with ECG of 16-Jan-2024 7:08:57,  Significant change in rhythm: previously sinus  New or worsened ischemia or infarction  Electronically Signed By: Jasbir Barrera (SARAH) 17-Jan-2024 06:21:52  Date and Time of Study: 2024-01-16 10:01:32    ECG 12 Lead Chest Pain [[id number]] Collected: 01/16/24 0708     Updated: 01/17/24 0637     QT Interval 626 ms      QTC Interval 593 ms     Narrative:      HEART RATE= 54  bpm  RR Interval= 1116  ms  AK Interval= 204  ms  P Horizontal Axis= -48  deg  P Front Axis= 3  deg  QRSD Interval= 144  ms  QT Interval= 626  ms  QTcB= 593  ms  QRS Axis= 101  deg  T Wave Axis= 85  deg  - ABNORMAL ECG -  Sinus  "bradycardia  Borderline prolonged WI interval  Consider left ventricular hypertrophy  Prolonged QT interval  No previous ECG available for comparison  Electronically Signed By: Jasbir Cooper (Mayur) 17-Jan-2024 06:37:06  Date and Time of Study: 2024-01-16 07:08:57    SCANNED - TELEMETRY   [[id number]] Resulted: 01/16/24     Updated: 01/17/24 0757    SCANNED - TELEMETRY   [[id number]] Resulted: 01/16/24     Updated: 01/17/24 1151    SCANNED - TELEMETRY   [[id number]] Resulted: 01/16/24     Updated: 01/17/24 1603    SCANNED - TELEMETRY   [[id number]] Resulted: 01/16/24     Updated: 01/17/24 2114    SCANNED - TELEMETRY   [[id number]] Resulted: 01/16/24     Updated: 01/18/24 0702    SCANNED - TELEMETRY   [[id number]] Resulted: 01/16/24     Updated: 01/18/24 0832    SCANNED - TELEMETRY   [[id number]] Resulted: 01/16/24     Updated: 01/18/24 0905                    US Liver    Result Date: 1/17/2024  Impression: Cholelithiasis with gallbladder sludge. There is gallbladder wall thickening with mild pericholecystic fluid. These findings raise the possibility of cholecystitis. Clinical and laboratory correlation are recommended. Electronically Signed: Cherrie Amato MD  1/17/2024 3:56 PM EST  Workstation ID: [id number]    CT Angiogram Abdomen Pelvis    Result Date: 1/16/2024  Impression: 1. No evidence of aortoiliac aneurysm or stenosis. 2. Shotty, somewhat numerous lymph nodes in the small bowel mesentery, which may be reactive, possibly viral mesenteric lymphadenitis. 3. Fecalization of distal small bowel, with no visible obstruction. Short segment of distal ileum with numerous diverticuli. Although this is somewhat unusual, there is no visible inflammatory change to suggest small bowel diverticulitis. Consider follow-up scan if patient symptoms persist or worsen. 4. Questionable gallbladder \"sludge\". No visible gallbladder wall inflammation or evidence of biliary ductal dilatation. 5. No evidence of potential inflammatory focus, " bowel obstruction, obstructive uropathy, or other acute disease is seen. Electronically Signed: Cesar Bernal MD  1/16/2024 8:55 AM EST  Workstation ID: [id number]       Estimated Creatinine Clearance: 189.7 mL/min (A) (by C-G formula based on SCr of 0.57 mg/dL (L)).    Assessment & Plan   Assessment/Plan       Active Hospital Problems    Diagnosis  POA    **Vomiting [R11.10]  Yes      Resolved Hospital Problems   No resolved problems to display.     Vomiting/nausea            Lab Results   Component Value Date     WBC 16.30 (H) 01/16/2024     AST 35 01/16/2024     ALT 47 (H) 01/16/2024     ALKPHOS 61 01/16/2024     BILITOT 0.5 01/16/2024     LIPASE 28 01/16/2024   -CMP showed potassium 3.2 this morning.  K. Dur x 2 ordered  -Lipase 28  -CBC showed WBC 16.38 and neutrophil 82.3  -UA negative for bacteria and nitrites  - rpp negative  -CT of CMP abdomen and pelvis: Showed no evidence of a aortoiliac aneurysm or stenosis.  No evidence of bowel obstruction.  Shotty lymph nodes in the small bowel suggesting possible viral mesenteric lymphadenitis   -In the ED patient received Bentyl, Benadryl, and iopamidol, morphine, Zofran, 1 L normal saline bolus  -Tylenol, Bentyl, Reglan, promethazine ordered  -EKG showed sinus bradycardia with heart rate of 51 and prolonged QT interval.  Will avoid Zofran  -Clear liquid diet, advance as tolerated  -GI consulted and recommends lab studies  - ferritin, hep panel, alpha 1, gamma gt, ceruloplasmin, lipase are unremarkable  -Monitor labs while admitted        Abdominal pain  - ct abd showed no evidence of a aortoiliac aneurysm or stenosis.  No evidence of bowel obstruction.  Shotty lymph nodes in the small bowel suggesting possible viral mesenteric lymphadenitis   - us ruq shows cholelithiasis with gall bladder sludge. Wall thickening with mild pericholecystic fluid.   - general surgery consulted      Hypertension  -Poorly controlled         BP Readings from Last 1 Encounters:   01/16/24  173/81   - Continue Cozaar  - Monitor while admitted      Hyperlipidemia  -Continue Lipitor     Depression/anxiety  -Continue Zoloft     Elevated INR  -Patient reports he is on Coumadin for an artificial heart valve.  Does not recall which valve  -INR 4.51, 5.45 today  -Hold Coumadin again today  -Consult pharmacy to dose Coumadin  -Recheck INR in a.m.      VTE Prophylaxis -   Mechanical Order History:        Ordered        01/16/24 1056  Place Sequential Compression Device  Once            01/16/24 1056  Maintain Sequential Compression Device  Continuous                          Pharmalogical Order History:       None            CODE STATUS:    Code Status and Medical Interventions:   Ordered at: 01/16/24 1046     Level Of Support Discussed With:    Patient     Code Status (Patient has no pulse and is not breathing):    CPR (Attempt to Resuscitate)     Medical Interventions (Patient has pulse or is breathing):    Full Support       This patient has been examined wearing personal protective equipment.     I discussed the patient's findings and my recommendations with patient and nursing staff.      Signature:Electronically signed by Adri Crowley PA-C, 01/18/24, 4:56 PM EST.

## 2024-01-18 NOTE — PLAN OF CARE
Problem: Adult Inpatient Plan of Care  Goal: Plan of Care Review  Outcome: Ongoing, Progressing  Goal: Patient-Specific Goal (Individualized)  Outcome: Ongoing, Progressing  Goal: Absence of Hospital-Acquired Illness or Injury  Outcome: Ongoing, Progressing  Intervention: Identify and Manage Fall Risk  Recent Flowsheet Documentation  Taken 1/18/2024 1200 by Aleksey Chandler RN  Safety Promotion/Fall Prevention: safety round/check completed  Taken 1/18/2024 1000 by Aleksey Chandler RN  Safety Promotion/Fall Prevention: safety round/check completed  Taken 1/18/2024 0800 by Aleksey Chandler RN  Safety Promotion/Fall Prevention: safety round/check completed  Intervention: Prevent Skin Injury  Recent Flowsheet Documentation  Taken 1/18/2024 0800 by Aleksey Chandler RN  Body Position: position changed independently  Intervention: Prevent and Manage VTE (Venous Thromboembolism) Risk  Recent Flowsheet Documentation  Taken 1/18/2024 0800 by Aleksey Chandler RN  Activity Management: up ad yann  Goal: Optimal Comfort and Wellbeing  Outcome: Ongoing, Progressing  Intervention: Monitor Pain and Promote Comfort  Recent Flowsheet Documentation  Taken 1/18/2024 0800 by Aleksey Chandler RN  Pain Management Interventions: see MAR  Intervention: Provide Person-Centered Care  Recent Flowsheet Documentation  Taken 1/18/2024 0800 by Aleksey Chandler RN  Trust Relationship/Rapport: care explained  Goal: Readiness for Transition of Care  Outcome: Ongoing, Progressing   Goal Outcome Evaluation:      Pt has not complained of nausea or vomiting mild head ache pain. General surgery consult was put in for possible cholecystis. Will continue to monitor pt

## 2024-01-18 NOTE — CONSULTS
General Surgery Consult Note      Name: James Rodriguez ADMIT: 2024   : 1985  PCP: Wally Henderson MD    MRN: [id number] LOS: 0 days   AGE/SEX: 38 y.o. male  ROOM: [address] [place] [address]      Patient Care Team:  Wally Henderson MD as PCP - General (Internal Medicine)  Chief Complaint   Patient presents with    Illness       HPI  38 y.o. male with a history of Marfan syndrome and prior laparotomy as an infant/ who presents with a several day history of upper abdominal pain, nausea, vomiting.  CT scan of the abdomen/pelvis showed some mesenteric adenitis, constipation, equalization of terminal ileum.  He had a follow-up ultrasound which showed cholelithiasis and gallbladder wall thickening.  He continued to have right upper quadrant pain and remained tender on exam.  General surgery consulted for evaluation for possible cholecystectomy.  Of note patient is on warfarin for history of prosthetic valve.    Past Medical History:   Diagnosis Date    Hyperlipidemia     Hypertension     Marfan's disease      Past Surgical History:  History of a laparotomy for perforated bowel-NEC?  Heart surgery x 2 including aortic valve replacement    History reviewed. No pertinent family history.    Social History     Tobacco Use    Smoking status: Every Day     Packs/day: .5     Types: Cigarettes    Smokeless tobacco: Never   Vaping Use    Vaping Use: Some days   Substance Use Topics    Alcohol use: Yes    Drug use: Yes     Types: Marijuana     Medications Prior to Admission   Medication Sig Dispense Refill Last Dose    atorvastatin (LIPITOR) 10 MG tablet Take 1 tablet by mouth Daily.   1/15/2024    losartan (COZAAR) 100 MG tablet Take 1 tablet by mouth Daily.   1/15/2024    sertraline (ZOLOFT) 100 MG tablet Take 2 tablets by mouth Daily.   1/15/2024    warfarin (COUMADIN) 7.5 MG tablet Take 1 tablet by mouth Every Night. Tuesday, Thursday, Saturday,    Past Week     atorvastatin, 10 mg, Oral,  Nightly  losartan, 100 mg, Oral, Daily  pantoprazole, 40 mg, Intravenous, BID AC  polyethylene glycol, 17 g, Oral, BID  senna-docusate sodium, 2 tablet, Oral, BID  sertraline, 200 mg, Oral, Daily  sodium chloride, 10 mL, Intravenous, Q12H      Pharmacy Consult - Pharmacy to dose,   sodium chloride, 125 mL/hr, Last Rate: 125 mL/hr (01/18/24 1635)        acetaminophen **OR** acetaminophen **OR** acetaminophen    aluminum-magnesium hydroxide-simethicone    senna-docusate sodium **AND** polyethylene glycol **AND** bisacodyl **AND** bisacodyl    dicyclomine    metoclopramide    nitroglycerin    ondansetron    Pharmacy Consult - Pharmacy to dose    [COMPLETED] Insert Peripheral IV **AND** sodium chloride    sodium chloride    sodium chloride  Latex    Review of Systems:   As noted in HPI    Vitals:  Temp:  [97.4 °F (36.3 °C)-98.4 °F (36.9 °C)] 98 °F (36.7 °C)  Heart Rate:  [60-76] 60  Resp:  [16-26] 16  BP: (114-133)/(64-73) 130/70     Physical Exam:   No acute distress, alert, marfanoid habitus  Nonlabored respirations  Chest with well-healed sternotomy incision extending into the upper abdomen  Abdomen soft, nondistended, tender to palpation in right upper quadrant, well-healed transverse mid abdominal incision    Labs:  Results from last 7 days   Lab Units 01/18/24  0345 01/17/24  0358 01/16/24  0641   WBC 10*3/mm3 19.10* 17.20* 16.30*   HEMOGLOBIN g/dL 17.0 16.8 17.1   HEMATOCRIT % 50.2 49.8 50.7   PLATELETS 10*3/mm3 218 222 223     Results from last 7 days   Lab Units 01/18/24  0345 01/17/24  0358 01/16/24  0641   SODIUM mmol/L 135* 139 140   POTASSIUM mmol/L 3.6 3.7 3.2*   CHLORIDE mmol/L 100 103 99   CO2 mmol/L 26.0 24.0 24.0   BUN mg/dL 8 8 14   CREATININE mg/dL 0.57* 0.53* 0.77   CALCIUM mg/dL 9.4 9.5 10.3   BILIRUBIN mg/dL 1.1  --  0.5   ALK PHOS U/L 67  --  61   ALT (SGPT) U/L 36  --  47*   AST (SGOT) U/L 33  --  35   GLUCOSE mg/dL 109* 113* 171*     Results from last 7 days   Lab Units 01/18/24  8525  "01/17/24  0358 01/16/24  0641   INR  5.45* 4.66* 4.51*     Imaging:  CTA abdomen/pelvis 1/16/2023  Impression:  1. No evidence of aortoiliac aneurysm or stenosis.  2. Shotty, somewhat numerous lymph nodes in the small bowel mesentery, which may be reactive, possibly viral mesenteric lymphadenitis.  3. Fecalization of distal small bowel, with no visible obstruction. Short segment of distal ileum with numerous diverticuli. Although this is somewhat unusual, there is no visible inflammatory change to suggest small bowel diverticulitis. Consider   follow-up scan if patient symptoms persist or worsen.  4. Questionable gallbladder \"sludge\". No visible gallbladder wall inflammation or evidence of biliary ductal dilatation.  5. No evidence of potential inflammatory focus, bowel obstruction, obstructive uropathy, or other acute disease is seen.     Ultrasound abdomen 1/17/2023  Impression:  Cholelithiasis with gallbladder sludge. There is gallbladder wall thickening with mild pericholecystic fluid. These findings raise the possibility of cholecystitis. Clinical and laboratory correlation are recommended.    Assessment and Plan:  38 y.o. male with a history of Marfan's and prosthetic valve on warfarin and prior history of laparotomy as an infant with persistent right upper quadrant pain.  Imaging concerning for possible cholecystitis.    - Given exam and ultrasound findings along with elevated white blood cell count suspect the patient may indeed have cholecystitis  - Cholecystectomy offered  - The surgery along with associated risk, benefits, alternatives were discussed with patient; risks discussed included but were not limited to bleeding, infection, hernia, conversion to open, common bile duct injury or injury to other intra-abdominal structures requiring further surgery  - We discussed higher likelihood of possible conversion to open given prior laparotomy as an infant as well as sternotomy incision extending to the " upper abdomen  - We discussed risk of bleeding with elevated INR and will ask for assistance in reversing the INR; will proceed with surgery when INR is less than or equal to 1.5  - Patient expressed understanding and wishes to proceed with surgery  -Timing of surgery depending on INR and schedule    This note was created using Dragon Voice Recognition software.    Chandrika Bran MD  01/18/24  22:42 EST

## 2024-01-18 NOTE — CONSULTS
[facility]   Consult Note    Patient Name: James Rodriguez  : 1985  MRN: [id number]  Primary Care Physician:  Wally Henderson MD  Referring Physician: No Known Provider  Date of admission: 2024    Subjective   Subjective     Reason for Consult/ Chief Complaint: Medical management patient with epigastric pain and cholecystitis with CAT scan showing mesenteric lymphadenitis  Patient has been on Coumadin for valve replacement with supratherapeutic INR    HPI:  James Rodriguez is a 38 y.o. male with history of Marfan syndrome  Patient status post valve replacement presumed to be aortic valve in the past has been on Coumadin  Patient with history of hypertension hyperlipidemia  Presented with abdominal pain for last few days  Patient had workup consistent with cholelithiasis and gallbladder sludge with gallbladder thickening possible cholecystitis  Patient is awaiting cholecystectomy but his INR was supratherapeutic so surgery is on hold    Review of Systems   Generalized abdominal pain some nausea no vomiting no diarrhea no fever no chills rest of 14 system reviewed and negative    Personal History     Past Medical History:   Diagnosis Date    Hyperlipidemia     Hypertension     Marfan's disease        History reviewed. No pertinent surgical history.    Family History: family history is not on file. Otherwise pertinent FHx was reviewed and not pertinent to current issue.    Social History:  reports that he has been smoking cigarettes. He has been smoking an average of .5 packs per day. He has never used smokeless tobacco. He reports current alcohol use. He reports current drug use. Drug: Marijuana.    Home Medications:  atorvastatin, losartan, sertraline, and warfarin    Allergies:  Allergies   Allergen Reactions    Latex Other (See Comments)     Primary care provider        Objective    Objective     Vitals:   Temp:  [97.4 °F (36.3 °C)-98.4 °F (36.9 °C)] 98 °F (36.7 °C)  Heart Rate:  [61-76]  61  Resp:  [16-26] 16  BP: (114-153)/(64-75) 130/71    Physical Exam:   Constitutional: Awake, alert   Eyes: PERRLA, sclerae anicteric, no conjunctival injection   HENT: NCAT, mucous membranes moist   Neck: Supple, no thyromegaly, no lymphadenopathy, trachea midline   Respiratory: Clear to auscultation bilaterally, nonlabored respirations    Cardiovascular: RRR, no murmurs, rubs, or gallops, palpable pedal pulses bilaterally   Gastrointestinal: Positive bowel sounds, soft, nontender, nondistended   Musculoskeletal: No bilateral ankle edema, no clubbing or cyanosis to extremities   Psychiatric: Appropriate affect, cooperative   Neurologic: Oriented x 3, strength symmetric in all extremities, Cranial Nerves grossly intact to confrontation, speech clear   Skin: No rashes       Result Review    Result Review:  I have personally reviewed the results from the time of this admission to 1/18/2024 17:49 EST and agree with these findings:  [x]  Laboratory list / accordion  []  Microbiology  []  Radiology  []  EKG/Telemetry   []  Cardiology/Vascular   []  Pathology  []  Old records  []  Other:  Most notable findings include:       Assessment & Plan   Assessment / Plan     Brief Patient Summary:  James Rodriguez is a 38 y.o. male who is here with abdominal pain  Patient with cholelithiasis and cholecystitis  Patient with history of Marfan syndrome status post valve replacement on Coumadin with supratherapeutic INR  Patient with history of hypertension hyperlipidemia  CAT scan of the abdomen showing viral mesenteric lymphadenitis  Patient with mild hyponatremia    Active Hospital Problems:  Active Hospital Problems    Diagnosis     **Vomiting      Plan:   Patient on IV fluids  Seen per GI and surgery  Plan for surgery once INR below 2  May need to be bridged as he has a valve replacement  Pathology will be consulted for management of his Coumadin toxicity and for possible bridging prior to surgery    Vida Nielsen MD

## 2024-01-18 NOTE — PROGRESS NOTES
"Pharmacy dosing service  Anticoagulant  Warfarin     Subjective:    James Rodriguez is a 38 y.o.male being continued on warfarin for Aortic Valve Replacement.    INR Goal: 2 - 3  Home medication?: warfarin 7.5 mg PO daily  Bridge Therapy Present?:  No  Interacting Medications Evaluation (New/Present/Discontinued):   Additional Contributing Factors:       Assessment/Plan:    INR remains supratherapeutic and continues to increase. Will continue to hold.      Continue to monitor and adjust based on INR.         Date 1/16 1/17 1/18         INR 4.51 4.66 5.45         Dose HOLD HOLD  HOLD              Objective:  [Ht: 190.5 cm (75\"); Wt: 77.1 kg (169 lb 15.6 oz); BMI: Body mass index is 21.25 kg/m².]    Lab Results   Component Value Date    ALBUMIN 4.8 01/16/2024     Lab Results   Component Value Date    INR 4.66 (H) 01/17/2024    INR 4.51 (H) 01/16/2024    INR 1.6 12/29/2022    PROTIME 45.3 (H) 01/17/2024    PROTIME 44.0 (H) 01/16/2024    PROTIME 16.2 (H) 12/29/2022     Lab Results   Component Value Date    HGB 16.8 01/17/2024    HGB 17.1 01/16/2024     Lab Results   Component Value Date    HCT 49.8 01/17/2024    HCT 50.7 01/16/2024       Alexia Zavala, PharmD  01/17/24 15:46 EST           "

## 2024-01-19 ENCOUNTER — APPOINTMENT (OUTPATIENT)
Dept: CARDIOLOGY | Facility: HOSPITAL | Age: 39
DRG: 418 | End: 2024-01-19
Payer: MEDICARE

## 2024-01-19 PROBLEM — I10 ESSENTIAL HYPERTENSION: Status: ACTIVE | Noted: 2024-01-19

## 2024-01-19 PROBLEM — R10.11 RIGHT UPPER QUADRANT PAIN: Status: ACTIVE | Noted: 2024-01-19

## 2024-01-19 PROBLEM — Q87.40 MARFAN'S SYNDROME: Status: ACTIVE | Noted: 2024-01-19

## 2024-01-19 LAB
ALBUMIN SERPL-MCNC: 3.9 G/DL (ref 3.5–5.2)
ALBUMIN/GLOB SERPL: 1.3 G/DL
ALP SERPL-CCNC: 67 U/L (ref 39–117)
ALT SERPL W P-5'-P-CCNC: 30 U/L (ref 1–41)
ANA SER QL: NEGATIVE
ANION GAP SERPL CALCULATED.3IONS-SCNC: 11 MMOL/L (ref 5–15)
AST SERPL-CCNC: 23 U/L (ref 1–40)
BASOPHILS # BLD MANUAL: 0.13 10*3/MM3 (ref 0–0.2)
BASOPHILS NFR BLD MANUAL: 1 % (ref 0–1.5)
BH CV ECHO MEAS - AO MAX PG: 19 MMHG
BH CV ECHO MEAS - AO MEAN PG: 11 MMHG
BH CV ECHO MEAS - AO V2 MAX: 218 CM/SEC
BH CV ECHO MEAS - AO V2 VTI: 41.6 CM
BH CV ECHO MEAS - AVA(I,D): 0.52 CM2
BH CV ECHO MEAS - EDV(CUBED): 132.7 ML
BH CV ECHO MEAS - EDV(MOD-SP4): 88.6 ML
BH CV ECHO MEAS - EF(MOD-BP): 67 %
BH CV ECHO MEAS - EF(MOD-SP4): 67.3 %
BH CV ECHO MEAS - ESV(CUBED): 42.9 ML
BH CV ECHO MEAS - ESV(MOD-SP4): 29 ML
BH CV ECHO MEAS - FS: 31.4 %
BH CV ECHO MEAS - IVS/LVPW: 1 CM
BH CV ECHO MEAS - IVSD: 1.2 CM
BH CV ECHO MEAS - LA DIMENSION: 3.6 CM
BH CV ECHO MEAS - LAT PEAK E' VEL: 7.3 CM/SEC
BH CV ECHO MEAS - LV MASS(C)D: 241.2 GRAMS
BH CV ECHO MEAS - LV MAX PG: 0.92 MMHG
BH CV ECHO MEAS - LV MEAN PG: 0 MMHG
BH CV ECHO MEAS - LV V1 MAX: 47.9 CM/SEC
BH CV ECHO MEAS - LV V1 VTI: 9.6 CM
BH CV ECHO MEAS - LVIDD: 5.1 CM
BH CV ECHO MEAS - LVIDS: 3.5 CM
BH CV ECHO MEAS - LVOT AREA: 2.27 CM2
BH CV ECHO MEAS - LVOT DIAM: 1.7 CM
BH CV ECHO MEAS - LVPWD: 1.2 CM
BH CV ECHO MEAS - MED PEAK E' VEL: 6.3 CM/SEC
BH CV ECHO MEAS - MV A MAX VEL: 50.9 CM/SEC
BH CV ECHO MEAS - MV DEC SLOPE: 224 CM/SEC2
BH CV ECHO MEAS - MV DEC TIME: 0.23 SEC
BH CV ECHO MEAS - MV E MAX VEL: 47.4 CM/SEC
BH CV ECHO MEAS - MV E/A: 0.93
BH CV ECHO MEAS - MV MAX PG: 1.53 MMHG
BH CV ECHO MEAS - MV MEAN PG: 1 MMHG
BH CV ECHO MEAS - MV P1/2T: 78.7 MSEC
BH CV ECHO MEAS - MV V2 VTI: 26.4 CM
BH CV ECHO MEAS - MVA(P1/2T): 2.8 CM2
BH CV ECHO MEAS - MVA(VTI): 0.82 CM2
BH CV ECHO MEAS - PA V2 MAX: 125 CM/SEC
BH CV ECHO MEAS - RV MAX PG: 3.3 MMHG
BH CV ECHO MEAS - RV V1 MAX: 90.8 CM/SEC
BH CV ECHO MEAS - RV V1 VTI: 16.9 CM
BH CV ECHO MEAS - SV(LVOT): 21.7 ML
BH CV ECHO MEAS - SV(MOD-SP4): 59.6 ML
BH CV ECHO MEAS - TAPSE (>1.6): 1.6 CM
BH CV ECHO MEAS - TR MAX PG: 21.2 MMHG
BH CV ECHO MEAS - TR MAX VEL: 230 CM/SEC
BH CV ECHO MEASUREMENTS AVERAGE E/E' RATIO: 6.97
BH CV XLRA - TDI S': 7.6 CM/SEC
BILIRUB SERPL-MCNC: 1 MG/DL (ref 0–1.2)
BUN SERPL-MCNC: 14 MG/DL (ref 6–20)
BUN/CREAT SERPL: 23 (ref 7–25)
CALCIUM SPEC-SCNC: 9.4 MG/DL (ref 8.6–10.5)
CHLORIDE SERPL-SCNC: 99 MMOL/L (ref 98–107)
CO2 SERPL-SCNC: 25 MMOL/L (ref 22–29)
CREAT SERPL-MCNC: 0.61 MG/DL (ref 0.76–1.27)
DEPRECATED RDW RBC AUTO: 43.8 FL (ref 37–54)
EGFRCR SERPLBLD CKD-EPI 2021: 125.3 ML/MIN/1.73
ERYTHROCYTE [DISTWIDTH] IN BLOOD BY AUTOMATED COUNT: 14.3 % (ref 12.3–15.4)
GLOBULIN UR ELPH-MCNC: 3.1 GM/DL
GLUCOSE SERPL-MCNC: 95 MG/DL (ref 65–99)
HCT VFR BLD AUTO: 48.1 % (ref 37.5–51)
HGB BLD-MCNC: 16.2 G/DL (ref 13–17.7)
INR PPP: 3.2 (ref 2–3)
LARGE PLATELETS: ABNORMAL
LYMPHOCYTES # BLD MANUAL: 1.06 10*3/MM3 (ref 0.7–3.1)
LYMPHOCYTES NFR BLD MANUAL: 9 % (ref 5–12)
MAGNESIUM SERPL-MCNC: 1.8 MG/DL (ref 1.6–2.6)
MCH RBC QN AUTO: 29.9 PG (ref 26.6–33)
MCHC RBC AUTO-ENTMCNC: 33.6 G/DL (ref 31.5–35.7)
MCV RBC AUTO: 88.9 FL (ref 79–97)
MONOCYTES # BLD: 1.19 10*3/MM3 (ref 0.1–0.9)
NEUTROPHILS # BLD AUTO: 10.82 10*3/MM3 (ref 1.7–7)
NEUTROPHILS NFR BLD MANUAL: 79 % (ref 42.7–76)
NEUTS BAND NFR BLD MANUAL: 3 % (ref 0–5)
PHOSPHATE SERPL-MCNC: 2.2 MG/DL (ref 2.5–4.5)
PLATELET # BLD AUTO: 201 10*3/MM3 (ref 140–450)
PMV BLD AUTO: 9.7 FL (ref 6–12)
POTASSIUM SERPL-SCNC: 3.4 MMOL/L (ref 3.5–5.2)
PROT SERPL-MCNC: 7 G/DL (ref 6–8.5)
PROTHROMBIN TIME: 32 SECONDS (ref 19.4–28.5)
RBC # BLD AUTO: 5.41 10*6/MM3 (ref 4.14–5.8)
RBC MORPH BLD: NORMAL
SCAN SLIDE: NORMAL
SINUS: 2.8 CM
SODIUM SERPL-SCNC: 135 MMOL/L (ref 136–145)
TOXIC GRANULATION: ABNORMAL
VARIANT LYMPHS NFR BLD MANUAL: 2 % (ref 0–5)
VARIANT LYMPHS NFR BLD MANUAL: 6 % (ref 19.6–45.3)
WBC NRBC COR # BLD AUTO: 13.2 10*3/MM3 (ref 3.4–10.8)

## 2024-01-19 PROCEDURE — 93306 TTE W/DOPPLER COMPLETE: CPT

## 2024-01-19 PROCEDURE — 99232 SBSQ HOSP IP/OBS MODERATE 35: CPT | Mod: FS | Performed by: NURSE PRACTITIONER

## 2024-01-19 PROCEDURE — 99221 1ST HOSP IP/OBS SF/LOW 40: CPT | Performed by: NURSE PRACTITIONER

## 2024-01-19 PROCEDURE — 25810000003 SODIUM CHLORIDE 0.9 % SOLUTION: Performed by: NURSE PRACTITIONER

## 2024-01-19 PROCEDURE — 85610 PROTHROMBIN TIME: CPT | Performed by: NURSE PRACTITIONER

## 2024-01-19 PROCEDURE — 80053 COMPREHEN METABOLIC PANEL: CPT | Performed by: NURSE PRACTITIONER

## 2024-01-19 PROCEDURE — 84100 ASSAY OF PHOSPHORUS: CPT | Performed by: INTERNAL MEDICINE

## 2024-01-19 PROCEDURE — 99222 1ST HOSP IP/OBS MODERATE 55: CPT | Performed by: INTERNAL MEDICINE

## 2024-01-19 PROCEDURE — 25010000002 PIPERACILLIN SOD-TAZOBACTAM PER 1 G: Performed by: SURGERY

## 2024-01-19 PROCEDURE — 93306 TTE W/DOPPLER COMPLETE: CPT | Performed by: INTERNAL MEDICINE

## 2024-01-19 PROCEDURE — 85025 COMPLETE CBC W/AUTO DIFF WBC: CPT | Performed by: NURSE PRACTITIONER

## 2024-01-19 PROCEDURE — 85007 BL SMEAR W/DIFF WBC COUNT: CPT | Performed by: NURSE PRACTITIONER

## 2024-01-19 PROCEDURE — 83735 ASSAY OF MAGNESIUM: CPT | Performed by: INTERNAL MEDICINE

## 2024-01-19 PROCEDURE — 99232 SBSQ HOSP IP/OBS MODERATE 35: CPT

## 2024-01-19 RX ADMIN — PANTOPRAZOLE SODIUM 40 MG: 40 INJECTION, POWDER, LYOPHILIZED, FOR SOLUTION INTRAVENOUS at 08:52

## 2024-01-19 RX ADMIN — PANTOPRAZOLE SODIUM 40 MG: 40 INJECTION, POWDER, LYOPHILIZED, FOR SOLUTION INTRAVENOUS at 16:33

## 2024-01-19 RX ADMIN — PIPERACILLIN AND TAZOBACTAM 3.38 G: 3; .375 INJECTION, POWDER, LYOPHILIZED, FOR SOLUTION INTRAVENOUS at 16:33

## 2024-01-19 RX ADMIN — PIPERACILLIN AND TAZOBACTAM 3.38 G: 3; .375 INJECTION, POWDER, FOR SOLUTION INTRAVENOUS at 10:37

## 2024-01-19 RX ADMIN — SERTRALINE 200 MG: 100 TABLET, FILM COATED ORAL at 21:40

## 2024-01-19 RX ADMIN — SODIUM CHLORIDE 125 ML/HR: 9 INJECTION, SOLUTION INTRAVENOUS at 10:36

## 2024-01-19 RX ADMIN — LOSARTAN POTASSIUM 100 MG: 50 TABLET, FILM COATED ORAL at 21:40

## 2024-01-19 RX ADMIN — Medication 10 ML: at 08:52

## 2024-01-19 RX ADMIN — ATORVASTATIN CALCIUM 10 MG: 10 TABLET, FILM COATED ORAL at 21:40

## 2024-01-19 NOTE — PLAN OF CARE
Problem: Adult Inpatient Plan of Care  Goal: Plan of Care Review  Outcome: Ongoing, Not Progressing  Goal: Patient-Specific Goal (Individualized)  Outcome: Ongoing, Not Progressing  Goal: Absence of Hospital-Acquired Illness or Injury  Outcome: Ongoing, Not Progressing  Intervention: Identify and Manage Fall Risk  Recent Flowsheet Documentation  Taken 1/19/2024 1200 by Aleksey Chandler RN  Safety Promotion/Fall Prevention: safety round/check completed  Taken 1/19/2024 1000 by Aleksey Chandler RN  Safety Promotion/Fall Prevention: safety round/check completed  Taken 1/19/2024 0800 by Aleksey Chandler RN  Safety Promotion/Fall Prevention: safety round/check completed  Intervention: Prevent Skin Injury  Recent Flowsheet Documentation  Taken 1/19/2024 0800 by Aleksey Chandler RN  Body Position: position changed independently  Intervention: Prevent and Manage VTE (Venous Thromboembolism) Risk  Recent Flowsheet Documentation  Taken 1/19/2024 0800 by Aleksey Chandler RN  Activity Management: up ad yann  Goal: Optimal Comfort and Wellbeing  Outcome: Ongoing, Not Progressing  Intervention: Monitor Pain and Promote Comfort  Recent Flowsheet Documentation  Taken 1/19/2024 0800 by Aleksey Chandler RN  Pain Management Interventions: see MAR  Intervention: Provide Person-Centered Care  Recent Flowsheet Documentation  Taken 1/19/2024 0800 by Aleksey Chandler RN  Trust Relationship/Rapport: care explained  Goal: Readiness for Transition of Care  Outcome: Ongoing, Not Progressing   Goal Outcome Evaluation:   Pt seen by cardiology for heart valve. Cardiology signed off. Pt to have surgery on Monday 1/22/24 for cholecystitis. Surgery moved due to INR being elevated. Continuing to monitor

## 2024-01-19 NOTE — PROGRESS NOTES
LOS: 0 days   Patient Care Team:  Wally Henderson MD as PCP - General (Internal Medicine)      Subjective     Interval History:     Subjective: Patient with no new complaints.  Continues to have RUQ pain.  No further nausea/vomiting.  Tolerating low-fat diet.      ROS:   No chest pain, shortness of breath, or cough.        Medication Review:     Current Facility-Administered Medications:     acetaminophen (TYLENOL) tablet 650 mg, 650 mg, Oral, Q4H PRN, 650 mg at 01/18/24 2022 **OR** acetaminophen (TYLENOL) 160 MG/5ML oral solution 650 mg, 650 mg, Oral, Q4H PRN **OR** acetaminophen (TYLENOL) suppository 650 mg, 650 mg, Rectal, Q4H PRN, Tripure, Neisha S, APRN    aluminum-magnesium hydroxide-simethicone (MAALOX MAX) 400-400-40 MG/5ML suspension 15 mL, 15 mL, Oral, Q6H PRN, Tripure, Neisha S, APRN, 15 mL at 01/17/24 0926    atorvastatin (LIPITOR) tablet 10 mg, 10 mg, Oral, Nightly, Tripure, Neisha S, APRN, 10 mg at 01/18/24 2022    sennosides-docusate (PERICOLACE) 8.6-50 MG per tablet 2 tablet, 2 tablet, Oral, BID, 2 tablet at 01/18/24 0801 **AND** polyethylene glycol (MIRALAX) packet 17 g, 17 g, Oral, Daily PRN **AND** bisacodyl (DULCOLAX) EC tablet 5 mg, 5 mg, Oral, Daily PRN **AND** bisacodyl (DULCOLAX) suppository 10 mg, 10 mg, Rectal, Daily PRN, Tripure, Neisha S, APRN    Calcium Replacement - Follow Nurse / BPA Driven Protocol, , Does not apply, PRN, Reed Ceja MD    dicyclomine (BENTYL) injection 20 mg, 20 mg, Intramuscular, 4x Daily PRN, Tripure, Neisha S, APRN, 20 mg at 01/17/24 2117    losartan (COZAAR) tablet 100 mg, 100 mg, Oral, Daily, Tripure, Neisha S, APRN, 100 mg at 01/17/24 2117    Magnesium Standard Dose Replacement - Follow Nurse / BPA Driven Protocol, , Does not apply, PRN, Reed Ceja MD    metoclopramide (REGLAN) injection 10 mg, 10 mg, Intravenous, Q6H PRN, Neisha Cha, APRN, 10 mg at 01/18/24 0802    nitroglycerin (NITROSTAT) SL tablet 0.4 mg, 0.4 mg,  Sublingual, Q5 Min PRN, Tripure, Neisha S, APRN    ondansetron (ZOFRAN) injection 4 mg, 4 mg, Intravenous, Q6H PRN, Adri Crowley PA-C    pantoprazole (PROTONIX) injection 40 mg, 40 mg, Intravenous, BID AC, Lizeth Recinos D, APRN, 40 mg at 01/19/24 0852    Pharmacy Consult - Pharmacy to dose, , Does not apply, Continuous PRN, Tripure Neisha S, APRN    Phosphorus Replacement - Follow Nurse / BPA Driven Protocol, , Does not apply, PRN, Reed Ceja MD    piperacillin-tazobactam (ZOSYN) IVPB 3.375 g in 100 mL NS (CD), 3.375 g, Intravenous, Q8H, Chandrika Bran MD    polyethylene glycol (MIRALAX) packet 17 g, 17 g, Oral, BID, Lizeth Recinos, APRN, 17 g at 01/18/24 0801    Potassium Replacement - Follow Nurse / BPA Driven Protocol, , Does not apply, PRN, Reed Ceja MD    sertraline (ZOLOFT) tablet 200 mg, 200 mg, Oral, Daily, Tripure, Neisha S, APRN, 200 mg at 01/18/24 2022    [COMPLETED] Insert Peripheral IV, , , Once **AND** sodium chloride 0.9 % flush 10 mL, 10 mL, Intravenous, PRN, Jasmin Cabral E, APRN    sodium chloride 0.9 % flush 10 mL, 10 mL, Intravenous, Q12H, Tripure, Neisha S, APRN, 10 mL at 01/19/24 0852    sodium chloride 0.9 % flush 10 mL, 10 mL, Intravenous, PRN, Tripure, Neisha S, APRN    sodium chloride 0.9 % infusion 40 mL, 40 mL, Intravenous, PRN, Tripure, Neisha S, APRN    sodium chloride 0.9 % infusion, 125 mL/hr, Intravenous, Continuous, Jasmin Cabral, APRN, Last Rate: 125 mL/hr at 01/19/24 1036, 125 mL/hr at 01/19/24 1036      Objective     Vital Signs  Vitals:    01/18/24 2256 01/19/24 0317 01/19/24 0610 01/19/24 1108   BP:  130/79 110/69 123/78   BP Location:  Right arm Right arm Left arm   Patient Position:  Lying Lying Lying   Pulse: 62   65   Resp: 16 15 15 21   Temp: 99.9 °F (37.7 °C) 98.4 °F (36.9 °C) 98.3 °F (36.8 °C) 98 °F (36.7 °C)   TempSrc: Oral Oral Oral Oral   SpO2: 96%   94%   Weight:       Height:           Physical Exam:     General Appearance:    Awake and  alert, in no acute distress   Head:    Normocephalic, without obvious abnormality   Eyes:          Conjunctivae normal, anicteric sclera   Throat:   No oral lesions, no thrush, oral mucosa moist   Neck:   No adenopathy, supple, no JVD   Lungs:     respirations regular, even and unlabored   Abdomen:     Soft, RUQ tenderness, no rebound or guarding, non-distended   Rectal:     Deferred   Extremities:   No edema, no cyanosis   Skin:   No bruising or rash, no jaundice        Results Review:    CBC    Results from last 7 days   Lab Units 01/19/24 0442 01/18/24 0345 01/17/24 0358 01/16/24  0641   WBC 10*3/mm3 13.20* 19.10* 17.20* 16.30*   HEMOGLOBIN g/dL 16.2 17.0 16.8 17.1   PLATELETS 10*3/mm3 201 218 222 223     CMP   Results from last 7 days   Lab Units 01/19/24 0442 01/18/24 0345 01/17/24 0358 01/16/24  0641   SODIUM mmol/L 135* 135* 139 140   POTASSIUM mmol/L 3.4* 3.6 3.7 3.2*   CHLORIDE mmol/L 99 100 103 99   CO2 mmol/L 25.0 26.0 24.0 24.0   BUN mg/dL 14 8 8 14   CREATININE mg/dL 0.61* 0.57* 0.53* 0.77   GLUCOSE mg/dL 95 109* 113* 171*   ALBUMIN g/dL 3.9 4.3  --  4.8   BILIRUBIN mg/dL 1.0 1.1  --  0.5   ALK PHOS U/L 67 67  --  61   AST (SGOT) U/L 23 33  --  35   ALT (SGPT) U/L 30 36  --  47*   MAGNESIUM mg/dL 1.8  --   --  1.7   PHOSPHORUS mg/dL 2.2*  --   --   --    LIPASE U/L  --  31  --  28     Cr Clearance Estimated Creatinine Clearance: 177.3 mL/min (A) (by C-G formula based on SCr of 0.61 mg/dL (L)).  Coag   Results from last 7 days   Lab Units 01/19/24 0442 01/18/24 0345 01/17/24 0358 01/16/24  0641   INR  3.20* 5.45* 4.66* 4.51*     HbA1C   Lab Results   Component Value Date    HGBA1C 5.50 01/16/2024         Infection     UA    Results from last 7 days   Lab Units 01/16/24  0712   NITRITE UA  Negative   WBC UA /HPF 0-2   BACTERIA UA /HPF None Seen   SQUAM EPITHEL UA /HPF 0-2     Microbiology Results (last 10 days)       Procedure Component Value - Date/Time    Gastrointestinal Panel, PCR - Stool,  Per Rectum [[id number]]  (Normal) Collected: 01/17/24 1329    Lab Status: Final result Specimen: Stool from Per Rectum Updated: 01/17/24 1501     Campylobacter Not Detected     Plesiomonas shigelloides Not Detected     Salmonella Not Detected     Vibrio Not Detected     Vibrio cholerae Not Detected     Yersinia enterocolitica Not Detected     Enteroaggregative E. coli (EAEC) Not Detected     Enteropathogenic E. coli (EPEC) Not Detected     Enterotoxigenic E. coli (ETEC) lt/st Not Detected     Shiga-like toxin-producing E. coli (STEC) stx1/stx2 Not Detected     Shigella/Enteroinvasive E. coli (EIEC) Not Detected     Cryptosporidium Not Detected     Cyclospora cayetanensis Not Detected     Entamoeba histolytica Not Detected     Giardia lamblia Not Detected     Adenovirus F40/41 Not Detected     Astrovirus Not Detected     Norovirus GI/GII Not Detected     Rotavirus A Not Detected     Sapovirus (I, II, IV or V) Not Detected    Narrative:      If Aeromonas, Staphylococcus aureus or Bacillus cereus are suspected, please order [id number]: Stool Culture, Aeromonas, S aureus, B Cereus.    Respiratory Panel PCR w/COVID-19(SARS-CoV-2) KATHERINE/GAUDENCIO/SARAH/PAD/COR/MICHAEL In-House, NP Swab in UTM/VTM, 2 HR TAT - Swab, Nasopharynx [[id number]]  (Normal) Collected: 01/16/24 0713    Lab Status: Final result Specimen: Swab from Nasopharynx Updated: 01/16/24 0821     ADENOVIRUS, PCR Not Detected     Coronavirus 229E Not Detected     Coronavirus HKU1 Not Detected     Coronavirus NL63 Not Detected     Coronavirus OC43 Not Detected     COVID19 Not Detected     Human Metapneumovirus Not Detected     Human Rhinovirus/Enterovirus Not Detected     Influenza A PCR Not Detected     Influenza B PCR Not Detected     Parainfluenza Virus 1 Not Detected     Parainfluenza Virus 2 Not Detected     Parainfluenza Virus 3 Not Detected     Parainfluenza Virus 4 Not Detected     RSV, PCR Not Detected     Bordetella pertussis pcr Not Detected     Bordetella parapertussis  PCR Not Detected     Chlamydophila pneumoniae PCR Not Detected     Mycoplasma pneumo by PCR Not Detected    Narrative:      In the setting of a positive respiratory panel with a viral infection PLUS a negative procalcitonin without other underlying concern for bacterial infection, consider observing off antibiotics or discontinuation of antibiotics and continue supportive care. If the respiratory panel is positive for atypical bacterial infection (Bordetella pertussis, Chlamydophila pneumoniae, or Mycoplasma pneumoniae), consider antibiotic de-escalation to target atypical bacterial infection.          Imaging Results (Last 72 Hours)       Procedure Component Value Units Date/Time    US Liver [[id number]] Collected: 01/17/24 1553     Updated: 01/17/24 1601    Narrative:      US LIVER    Date of Exam: 1/17/2024 3:47 PM EST    Indication: elevated LFTs.    Comparison: No comparisons available.    Technique: Grayscale and color Doppler ultrasound evaluation of the right upper quadrant was performed.      Findings:  LIVER:  The liver appears normal in echogenicity.the liver measures 17.4 cm in length. No focal lesions identified. Normal flow is present within the hepatic vasculature.     GALLBLADDER: The gallbladder contains sludge and stones and the wall is thickened up to 1.3 cm. There is mild pericholecystic fluid. The common bile duct is normal.    PANCREAS:  Visualized portions are unremarkable.    RIGHT KIDNEY:  Normal in size with no focal lesions. No evidence of nephrolithiasis or hydronephrosis.          Impression:      Impression:  Cholelithiasis with gallbladder sludge. There is gallbladder wall thickening with mild pericholecystic fluid. These findings raise the possibility of cholecystitis. Clinical and laboratory correlation are recommended.        Electronically Signed: Cherrie Amato MD    1/17/2024 3:56 PM EST    Workstation ID: [id number]            Assessment & Plan     ASSESSMENT:  -Epigastric  pain  -Nausea/vomiting  -Abnormal CTA showing possible viral mesenteric lymphadenitis along with fecalization of the distal small bowel  -Mildly elevated LFTs  -Coumadin toxicity  -Leukocytosis  -History of valve replacement on Coumadin  -Marfan syndrome  -Hypertension  -Hyperlipidemia  -History of abdominal surgery as a baby     PLAN:  Patient is a 38-year-old male with history of Marfan syndrome, abdominal surgery as an infant, and valve replacement on Coumadin who presented on 1/16 with complaints of abdominal pain and nausea/vomiting. CTA on admission shows no evidence of aneurysm or stenosis, possible viral mesenteric lymphadenitis, fecalization of the distal small bowel, and possible gallbladder sludge.     Patient with no new complaints.  Continues to have RUQ pain without nausea/vomiting.  LFTs remain normal.  RUQ US shows cholelithiasis with gallbladder sludge.  Gallbladder wall thickening is noted raising the possibility of cholecystitis.   General surgery is following and are planning laparoscopic cholecystectomy once INR allows.  Continue to hold Coumadin.  Continue PPI.  Not much else to add from a GI standpoint as an inpatient at this time.  We will be available as needed.    Electronically signed by STEVE Summers, 01/19/24, 11:21 AM EST.

## 2024-01-19 NOTE — PROGRESS NOTES
General Surgery Progress Note    Name: James Rodriguez ADMIT: 2024   : 1985  PCP: Wally Henderson MD    MRN: [id number] LOS: 0 days   AGE/SEX: 38 y.o. male  ROOM: [address] [place] [address]    Chief Complaint   Patient presents with    Illness     Subjective     Patient seen and examined.  Vital signs stable, afebrile.  Reports increased abdominal pain and increased distention.  Denies nausea and vomiting.  Last BM was yesterday.  Leukocytosis improving, 13.2 (19.1) this morning.  Hemoglobin stable.  LFTs unremarkable.    Objective     Scheduled Medications:   atorvastatin, 10 mg, Oral, Nightly  losartan, 100 mg, Oral, Daily  pantoprazole, 40 mg, Intravenous, BID AC  piperacillin-tazobactam, 3.375 g, Intravenous, Q8H  polyethylene glycol, 17 g, Oral, BID  senna-docusate sodium, 2 tablet, Oral, BID  sertraline, 200 mg, Oral, Daily  sodium chloride, 10 mL, Intravenous, Q12H        Active Infusions:  Pharmacy Consult - Pharmacy to dose,   sodium chloride, 125 mL/hr, Last Rate: 125 mL/hr (24 1036)        As Needed Medications:    acetaminophen **OR** acetaminophen **OR** acetaminophen    aluminum-magnesium hydroxide-simethicone    senna-docusate sodium **AND** polyethylene glycol **AND** bisacodyl **AND** bisacodyl    Calcium Replacement - Follow Nurse / BPA Driven Protocol    dicyclomine    Magnesium Standard Dose Replacement - Follow Nurse / BPA Driven Protocol    metoclopramide    nitroglycerin    ondansetron    Pharmacy Consult - Pharmacy to dose    Phosphorus Replacement - Follow Nurse / BPA Driven Protocol    Potassium Replacement - Follow Nurse / BPA Driven Protocol    [COMPLETED] Insert Peripheral IV **AND** sodium chloride    sodium chloride    sodium chloride    Vital Signs  Vital Signs Patient Vitals for the past 24 hrs:   BP Temp Temp src Pulse Resp SpO2   24 1108 123/78 98 °F (36.7 °C) Oral 65 21 94 %   24 0610 110/69 98.3 °F (36.8 °C) Oral -- 15 --   24 0317 130/79 98.4  °F (36.9 °C) Oral -- 15 --   01/18/24 2256 -- 99.9 °F (37.7 °C) Oral 62 16 96 %   01/18/24 1717 130/70 98 °F (36.7 °C) Oral 60 16 99 %     I/O:  I/O last 3 completed shifts:  In: -   Out: 1200 [Urine:1200]    Physical Exam:  Physical Exam  Constitutional:       General: He is not in acute distress.  Cardiovascular:      Rate and Rhythm: Normal rate.   Pulmonary:      Effort: Pulmonary effort is normal. No respiratory distress.   Abdominal:      General: There is distension.      Palpations: Abdomen is soft.      Tenderness: There is no abdominal tenderness. There is no guarding.   Neurological:      Mental Status: He is alert and oriented to person, place, and time.   Psychiatric:         Mood and Affect: Mood normal.         Behavior: Behavior normal.         Results Review:     CBC    Results from last 7 days   Lab Units 01/19/24 0442 01/18/24 0345 01/17/24  0358 01/16/24  0641   WBC 10*3/mm3 13.20* 19.10* 17.20* 16.30*   HEMOGLOBIN g/dL 16.2 17.0 16.8 17.1   PLATELETS 10*3/mm3 201 218 222 223     BMP   Results from last 7 days   Lab Units 01/19/24 0442 01/18/24 0345 01/17/24  0358 01/16/24  0641   SODIUM mmol/L 135* 135* 139 140   POTASSIUM mmol/L 3.4* 3.6 3.7 3.2*   CHLORIDE mmol/L 99 100 103 99   CO2 mmol/L 25.0 26.0 24.0 24.0   BUN mg/dL 14 8 8 14   CREATININE mg/dL 0.61* 0.57* 0.53* 0.77   GLUCOSE mg/dL 95 109* 113* 171*   MAGNESIUM mg/dL 1.8  --   --  1.7   PHOSPHORUS mg/dL 2.2*  --   --   --      Radiology(recent) US Liver    Result Date: 1/17/2024  Impression: Cholelithiasis with gallbladder sludge. There is gallbladder wall thickening with mild pericholecystic fluid. These findings raise the possibility of cholecystitis. Clinical and laboratory correlation are recommended. Electronically Signed: Cherrie Amato MD  1/17/2024 3:56 PM EST  Workstation ID: [id number]     I reviewed the patient's new clinical results.    Assessment & Plan       Vomiting    Marfan's syndrome      38 y.o. male, with a past  medical history of Marfan's, prosthetic valve on warfarin, status post laparotomy as an infant, admitted 1/16/2024 with complaints of right upper quadrant pain.  Imaging suspicious for cholecystitis    Okay for diet  Trend INR, 3.2 this morning  Antiemetics and pain medication as needed  Planning for robotic cholecystectomy with Dr. Bran on Monday if INR is appropriate  Further input to follow        This note was created using Dragon Voice Recognition software.    NILESH Perales  01/19/24  11:39 EST

## 2024-01-19 NOTE — CASE MANAGEMENT/SOCIAL WORK
Continued Stay Note  [facility]     Patient Name: James Rodriguez  MRN: [id number]  Today's Date: 1/19/2024    Admit Date: 1/16/2024    Plan: Return home with S.O   Discharge Plan       Row Name 01/19/24 0947       Plan    Plan Comments Barriers: New Cardiology consult for evaluation of previous valve replacement,  on coumadin pending cholecystectomy. General Surgery pending cholecystectomy pending INR . CM met with Mr. Rodriguez and explained IMM and gave him copy.  CM will continue to follow for discharge needs                           Maintained distance greater than six feet and spent less than 15 minutes in the room.     Ana MCCAULEYN,RN Case Manager  [facility]  Phone: Desk- [phone] cell- [phone]

## 2024-01-19 NOTE — CONSULTS
[facility]   CONSULT NOTE    Referring Provider: Reed Ceja MD    Reason for Consultation: coumadin bridging/preoperative cardiac assessment      Patient Care Team:  Wally Henderson MD as PCP - General (Internal Medicine)      Chief complaint: stomach pain    History of present illness:  James Rodriguez is a 38 y.o. male with past medical history of  Marfan's syndrome, mechanical aortic valve replacement 1998, chronic anticoagulation, hypertension, hyperlipidemia who presented to the ED with abdominal pain with nausea and vomiting. Patient reports symptoms started 2 days ago and progressively worsened. He was found to have cholelithiasis with possible cholecystitis. Surgery was consulted for possible gallbladder removal. INR was supratherapeutic and therefore surgery postponed. Cardiology was consulted for bridging of INR and provide preoperative cardiac surgical assessment given artificial valve. Patient sees Dr. Downing for primary cardiology.    Patient denies chest pain, palpitations, dizziness, lightheadedness, or shortness of breath.  In ED, INR 5.45 (now 3.2), WBC 19.10, other labs unremarkable.  EKG sinus bradycardia, rate 51 bpm, WI 223ms, QT 685ms    Review of Systems   Constitutional: Negative for fever and malaise/fatigue.   Cardiovascular:  Negative for chest pain, irregular heartbeat and palpitations.   Respiratory:  Negative for cough and shortness of breath.    Gastrointestinal:  Positive for abdominal pain, nausea and vomiting.   Neurological:  Negative for dizziness and light-headedness.   All other systems reviewed and are negative.      History  Past Medical History:   Diagnosis Date    Hyperlipidemia     Hypertension     Marfan's disease        History reviewed. No pertinent surgical history.    History reviewed. No pertinent family history.    Social History     Tobacco Use    Smoking status: Every Day     Packs/day: .5     Types: Cigarettes     Smokeless tobacco: Never   Vaping Use    Vaping Use: Some days   Substance Use Topics    Alcohol use: Yes    Drug use: Yes     Types: Marijuana        Medications Prior to Admission   Medication Sig Dispense Refill Last Dose    atorvastatin (LIPITOR) 10 MG tablet Take 1 tablet by mouth Daily.   1/15/2024    losartan (COZAAR) 100 MG tablet Take 1 tablet by mouth Daily.   1/15/2024    sertraline (ZOLOFT) 100 MG tablet Take 2 tablets by mouth Daily.   1/15/2024    warfarin (COUMADIN) 7.5 MG tablet Take 1 tablet by mouth Every Night. Tuesday, Thursday, Saturday, Sunday   Past Week         Latex    Scheduled Meds:  atorvastatin, 10 mg, Oral, Nightly  losartan, 100 mg, Oral, Daily  pantoprazole, 40 mg, Intravenous, BID AC  piperacillin-tazobactam, 3.375 g, Intravenous, Q8H  polyethylene glycol, 17 g, Oral, BID  senna-docusate sodium, 2 tablet, Oral, BID  sertraline, 200 mg, Oral, Daily  sodium chloride, 10 mL, Intravenous, Q12H        Continuous Infusions:  Pharmacy Consult - Pharmacy to dose,   sodium chloride, 125 mL/hr, Last Rate: 125 mL/hr (01/19/24 1036)        PRN Meds:    acetaminophen **OR** acetaminophen **OR** acetaminophen    aluminum-magnesium hydroxide-simethicone    senna-docusate sodium **AND** polyethylene glycol **AND** bisacodyl **AND** bisacodyl    Calcium Replacement - Follow Nurse / BPA Driven Protocol    dicyclomine    Magnesium Standard Dose Replacement - Follow Nurse / BPA Driven Protocol    metoclopramide    nitroglycerin    ondansetron    Pharmacy Consult - Pharmacy to dose    Phosphorus Replacement - Follow Nurse / BPA Driven Protocol    Potassium Replacement - Follow Nurse / BPA Driven Protocol    [COMPLETED] Insert Peripheral IV **AND** sodium chloride    sodium chloride    sodium chloride      VITAL SIGNS  Vitals:    01/18/24 2256 01/19/24 0317 01/19/24 0610 01/19/24 1108   BP:  130/79 110/69 123/78   BP Location:  Right arm Right arm Left arm   Patient Position:  Lying Lying Lying   Pulse:  "62   65   Resp: 16 15 15 21   Temp: 99.9 °F (37.7 °C) 98.4 °F (36.9 °C) 98.3 °F (36.8 °C) 98 °F (36.7 °C)   TempSrc: Oral Oral Oral Oral   SpO2: 96%   94%   Weight:       Height:           Flowsheet Rows      Flowsheet Row First Filed Value   Admission Height 190.5 cm (75\") Documented at 01/16/2024 0614   Admission Weight 77.1 kg (169 lb 15.6 oz) Documented at 01/16/2024 0614             TELEMETRY: sinus bradycardia    Physical Exam:  Vitals reviewed.   Constitutional:       General: Awake.      Appearance: Normal weight and not in distress.   Eyes:      Pupils: Pupils are equal, round, and reactive to light.   Pulmonary:      Effort: Pulmonary effort is normal.   Cardiovascular:      Normal rate. Regular rhythm. Normal S1. Normal S2.        click. Click from artifical aortic valve replacement   Pulses:     Intact distal pulses.   Edema:     Peripheral edema absent.   Abdominal:      General: Bowel sounds are normal.   Musculoskeletal: Normal range of motion.      Cervical back: Normal range of motion. Skin:     General: Skin is warm and dry.   Neurological:      Mental Status: Alert and oriented to person, place and time.   Psychiatric:         Behavior: Behavior is cooperative.            LAB RESULTS (LAST 7 DAYS)    CMP   Results from last 7 days   Lab Units 01/19/24 0442 01/18/24 0345 01/17/24 0358 01/16/24  0641   SODIUM mmol/L 135* 135* 139 140   POTASSIUM mmol/L 3.4* 3.6 3.7 3.2*   CHLORIDE mmol/L 99 100 103 99   CO2 mmol/L 25.0 26.0 24.0 24.0   BUN mg/dL 14 8 8 14   CREATININE mg/dL 0.61* 0.57* 0.53* 0.77   GLUCOSE mg/dL 95 109* 113* 171*   ALBUMIN g/dL 3.9 4.3  --  4.8   BILIRUBIN mg/dL 1.0 1.1  --  0.5   ALK PHOS U/L 67 67  --  61   AST (SGOT) U/L 23 33  --  35   ALT (SGPT) U/L 30 36  --  47*   LIPASE U/L  --  31  --  28       BMP  Results from last 7 days   Lab Units 01/19/24 0442 01/18/24 0345 01/17/24  0358 01/16/24  0641   SODIUM mmol/L 135* 135* 139 140   POTASSIUM mmol/L 3.4* 3.6 3.7 3.2* "   CHLORIDE mmol/L 99 100 103 99   CO2 mmol/L 25.0 26.0 24.0 24.0   BUN mg/dL 14 8 8 14   CREATININE mg/dL 0.61* 0.57* 0.53* 0.77   GLUCOSE mg/dL 95 109* 113* 171*   MAGNESIUM mg/dL 1.8  --   --  1.7   PHOSPHORUS mg/dL 2.2*  --   --   --        CBC  Results from last 7 days   Lab Units 01/19/24  0442 01/18/24  0345 01/17/24  0358   WBC 10*3/mm3 13.20* 19.10* 17.20*   RBC 10*6/mm3 5.41 5.63 5.60   HEMOGLOBIN g/dL 16.2 17.0 16.8   HEMATOCRIT % 48.1 50.2 49.8   MCV fL 88.9 89.2 89.0   PLATELETS 10*3/mm3 201 218 222       ProBNP        TROPONIN        Creatinine Clearance  Estimated Creatinine Clearance: 177.3 mL/min (A) (by C-G formula based on SCr of 0.61 mg/dL (L)).      Radiology  US Liver    Result Date: 1/17/2024  Impression: Cholelithiasis with gallbladder sludge. There is gallbladder wall thickening with mild pericholecystic fluid. These findings raise the possibility of cholecystitis. Clinical and laboratory correlation are recommended. Electronically Signed: Cherrie Amato MD  1/17/2024 3:56 PM EST  Workstation ID: [id number]     EKG    I personally viewed and interpreted the patient's EKG/Telemetry data:  ECG 12 Lead QT Measurement   Final Result   HEART RATE= 51  bpm   RR Interval= 1188  ms   TX Interval= 223  ms   P Horizontal Axis=   deg   P Front Axis= -48  deg   QRSD Interval= 152  ms   QT Interval= 685  ms   QTcB= 628  ms   QRS Axis= 100  deg   T Wave Axis= 90  deg   - ABNORMAL ECG -   Sinus or ectopic atrial bradycardia   Prolonged TX interval   Consider left ventricular hypertrophy   Abnormal T, probable ischemia, lateral leads   Prolonged QT interval   When compared with ECG of 16-Jan-2024 7:08:57,   Significant change in rhythm: previously sinus   New or worsened ischemia or infarction   Electronically Signed By: Jasbir Barrera (SARAH) 17-Jan-2024 06:21:52   Date and Time of Study: 2024-01-16 10:01:32      ECG 12 Lead Chest Pain   Final Result   HEART RATE= 54  bpm   RR Interval= 1116  ms   TX Interval=  204  ms   P Horizontal Axis= -48  deg   P Front Axis= 3  deg   QRSD Interval= 144  ms   QT Interval= 626  ms   QTcB= 593  ms   QRS Axis= 101  deg   T Wave Axis= 85  deg   - ABNORMAL ECG -   Sinus bradycardia   Borderline prolonged OH interval   Consider left ventricular hypertrophy   Prolonged QT interval   No previous ECG available for comparison   Electronically Signed By: Jasbir Cooper (Mayur) 17-Jan-2024 06:37:06   Date and Time of Study: 2024-01-16 07:08:57      SCANNED - TELEMETRY     Final Result      SCANNED - TELEMETRY     Final Result      SCANNED - TELEMETRY     Final Result      SCANNED - TELEMETRY     Final Result      SCANNED - TELEMETRY     Final Result      SCANNED - TELEMETRY     Final Result      SCANNED - TELEMETRY     Final Result      SCANNED - TELEMETRY     Final Result      SCANNED - TELEMETRY     Final Result      SCANNED - TELEMETRY     Final Result          ECHOCARDIOGRAM:      STRESS MYOVIEW:      CARDIAC CATHETERIZATION:  No results found for this or any previous visit.      OTHER:     Pertinent cardiac workup:  EKG 1/16/2024 sinus bradycardia, rate 51, OH 223ms, Qtc 628 ms      Assessment & Plan       Vomiting    Marfan's syndrome    Essential hypertension    Right upper quadrant pain      PLAN  James Rodriguez is a 38 y.o. male with past medical history of  Marfan's syndrome, mechanical aortic valve replacement 1998, chronic anticoagulation, hypertension, hyperlipidemia who presented to the ED with abdominal pain with nausea and vomiting.    Right upper quadrant pain  -imaging suspicious for cholecystitis  -c/o nausea and vomiting for several days  -surgery planned for Monday if INR appropriate    Marfan's Syndrome  -s/p mechanical aortic valve as a teenager  -chronic anticoagulation with coumadin  -INR supratherapeutic - was 5.45, now 3.20  -Repeat INR in am, when closer to 2.5 will bridge with lovenox BID    Hypertension  -continue home medications    Preoperative risk  assessment  -Echocardiogram ordered  -Trend INR, bridge with lovenox when closer to 2.5  -surgery planned for Monday    I discussed the patients findings and my recommendations with patient and nurse.      STEVE Garcia  01/19/24  12:11 EST  Electronically signed by STEVE Gracia, 01/19/24, 12:17 PM EST.

## 2024-01-19 NOTE — PLAN OF CARE
Problem: Adult Inpatient Plan of Care  Goal: Absence of Hospital-Acquired Illness or Injury  Outcome: Ongoing, Progressing  Intervention: Identify and Manage Fall Risk  Recent Flowsheet Documentation  Taken 1/19/2024 0610 by Cande Turner RN  Safety Promotion/Fall Prevention:   safety round/check completed   assistive device/personal items within reach   clutter free environment maintained   nonskid shoes/slippers when out of bed  Taken 1/19/2024 0400 by Cande Turner RN  Safety Promotion/Fall Prevention:   safety round/check completed   assistive device/personal items within reach   clutter free environment maintained   nonskid shoes/slippers when out of bed  Taken 1/19/2024 0200 by Cande Turner RN  Safety Promotion/Fall Prevention:   safety round/check completed   assistive device/personal items within reach   clutter free environment maintained   nonskid shoes/slippers when out of bed  Taken 1/19/2024 0000 by Cande Turner RN  Safety Promotion/Fall Prevention:   safety round/check completed   assistive device/personal items within reach   clutter free environment maintained   nonskid shoes/slippers when out of bed  Taken 1/18/2024 2256 by Cande Turner RN  Safety Promotion/Fall Prevention:   safety round/check completed   assistive device/personal items within reach   clutter free environment maintained   nonskid shoes/slippers when out of bed  Taken 1/18/2024 2022 by Cande Turner RN  Safety Promotion/Fall Prevention:   safety round/check completed   assistive device/personal items within reach   clutter free environment maintained   nonskid shoes/slippers when out of bed  Intervention: Prevent Skin Injury  Recent Flowsheet Documentation  Taken 1/18/2024 2022 by Cande Turner RN  Body Position: position changed independently  Skin Protection: adhesive use limited  Intervention: Prevent and Manage VTE (Venous Thromboembolism) Risk  Recent Flowsheet Documentation  Taken 1/18/2024 2022 by Cande Turner RN  Activity  Management: up ad yann  VTE Prevention/Management: patient refused intervention  Intervention: Prevent Infection  Recent Flowsheet Documentation  Taken 1/19/2024 0610 by Cande Turner RN  Infection Prevention:   hand hygiene promoted   rest/sleep promoted   single patient room provided  Taken 1/19/2024 0400 by Cande Turner RN  Infection Prevention:   hand hygiene promoted   rest/sleep promoted   single patient room provided  Taken 1/19/2024 0200 by Cande Turner RN  Infection Prevention:   hand hygiene promoted   rest/sleep promoted   single patient room provided  Taken 1/19/2024 0000 by Cande Turner RN  Infection Prevention:   hand hygiene promoted   rest/sleep promoted   single patient room provided  Taken 1/18/2024 2256 by Cande Turner RN  Infection Prevention:   hand hygiene promoted   rest/sleep promoted   single patient room provided  Taken 1/18/2024 2022 by Cande Turner RN  Infection Prevention:   hand hygiene promoted   rest/sleep promoted   single patient room provided     Problem: Pain Acute  Goal: Acceptable Pain Control and Functional Ability  Outcome: Ongoing, Progressing  Intervention: Optimize Psychosocial Wellbeing  Recent Flowsheet Documentation  Taken 1/18/2024 2022 by Cande Turner RN  Supportive Measures: active listening utilized  Diversional Activities:   smartphone   television   Goal Outcome Evaluation:      Pt NPO since 0001, resting comfortably. Will continue to monitor.

## 2024-01-19 NOTE — PROGRESS NOTES
[facility] Medicine Services Daily Progress Note    Patient Name: James Rodriguez  : 1985  MRN: [id number]  Primary Care Physician:  Wally Henderson MD  Date of admission: 2024      Subjective      Chief Complaint: Nausea vomiting      Patient Reports he is hungry and has some lower abdominal pain.  He thinks is from hunger.  Diet ordered as INR still elevated.  Likely cholecystectomy on Monday now.  Cardiology consulted for perioperative management for valve    ROS negative except as above      Objective      Vitals:   Temp:  [98 °F (36.7 °C)-99.9 °F (37.7 °C)] 98 °F (36.7 °C)  Heart Rate:  [60-65] 65  Resp:  [15-21] 21  BP: (110-130)/(69-79) 123/78    Physical Exam  Vitals reviewed.   Constitutional:       Comments: Patient has strabismus  Laying in bed no distress  Marfanoid body habitus   HENT:      Head: Normocephalic.   Cardiovascular:      Rate and Rhythm: Normal rate.   Pulmonary:      Effort: Pulmonary effort is normal.   Abdominal:      General: Abdomen is flat.      Palpations: Abdomen is soft.   Musculoskeletal:         General: Normal range of motion.      Cervical back: Normal range of motion.   Skin:     General: Skin is warm.   Neurological:      General: No focal deficit present.      Mental Status: He is alert and oriented to person, place, and time.   Psychiatric:         Mood and Affect: Mood normal.         Behavior: Behavior normal.             Result Review    Result Review:  I have personally reviewed the results from the time of this admission to 2024 13:58 EST and agree with these findings:  [x]  Laboratory  []  Microbiology  []  Radiology  []  EKG/Telemetry   []  Cardiology/Vascular   []  Pathology  []  Old records  []  Other:  Most notable findings include: INR is 3.2 potassium is 3.4 white count is 13.2      Assessment & Plan      Brief Patient Summary:  James Rodriguez is a 38 y.o. male who presents with nausea vomiting possible  cholecystitis/choledocholithiasis      atorvastatin, 10 mg, Oral, Nightly  losartan, 100 mg, Oral, Daily  pantoprazole, 40 mg, Intravenous, BID AC  piperacillin-tazobactam, 3.375 g, Intravenous, Q8H  polyethylene glycol, 17 g, Oral, BID  senna-docusate sodium, 2 tablet, Oral, BID  sertraline, 200 mg, Oral, Daily  sodium chloride, 10 mL, Intravenous, Q12H       Pharmacy Consult - Pharmacy to dose,   sodium chloride, 125 mL/hr, Last Rate: 125 mL/hr (01/19/24 1036)         Active Hospital Problems:  Active Hospital Problems    Diagnosis     **Vomiting     Marfan's syndrome     Essential hypertension     Right upper quadrant pain      Plan:   Continue IV fluids  Resume diet for now  INR still 3+ hence surgery delayed till Monday  Pending cholecystectomy with general surgery  Need INR below 2  Perioperative valve management per cardiology appreciate assistance.    DVT prophylaxis:  Medical and mechanical DVT prophylaxis orders are present.    CODE STATUS:    Level Of Support Discussed With: Patient  Code Status (Patient has no pulse and is not breathing): CPR (Attempt to Resuscitate)  Medical Interventions (Patient has pulse or is breathing): Full Support        This patient has been examined wearing appropriate Personal Protective Equipment and discussed with  patient and staff . 01/19/24      Electronically signed by Reed Ceja MD, 01/19/24, 13:58 EST.  [facility]ist [facility]

## 2024-01-19 NOTE — CONSULTS
"        Hematology/Oncology Inpatient Consultation    Patient name: James Rodriguez  : 1985  MRN: [id number]  Referring Provider: Adri Crowley PA-C  Reason for Consultation: Elevated INR    Chief complaint: Stomach pain    History of present illness:    James Rodriguez is a 38 y.o. male with PMH of Marfan's syndrome, mechanical aortic valve replacement , chronic anticoagulation, hypertension, hyperlipidemia who presented to [facility] on 2024 with complaints of abdominal pain.  Ports symptoms started 2 days ago and progressively was worsening.  He was found to have cholelithiasis with possible cholecystitis and surgery was consulted for possible cholecystectomy.  However INR was supratherapeutic and therefore surgery was postponed.    2024 CT angiogram abdominal pelvis  Impression:   1. No evidence of aortoiliac aneurysm or stenosis.   2. Shotty, somewhat numerous lymph nodes in the small bowel mesentery, which may be reactive, possibly viral mesenteric lymphadenitis.   3. Fecalization of distal small bowel, with no visible obstruction. Short segment of distal ileum with numerous diverticuli. Although this is somewhat unusual, there is no visible inflammatory change to suggest small bowel diverticulitis. Consider   follow-up scan if patient symptoms persist or worsen.   4. Questionable gallbladder \"sludge\". No visible gallbladder wall inflammation or evidence of biliary ductal dilatation.   5. No evidence of potential inflammatory focus, bowel obstruction, obstructive uropathy, or other acute disease is seen.    2024 liver ultrasound  Impression:  Cholelithiasis with gallbladder sludge. There is gallbladder wall thickening with mild pericholecystic fluid. These findings raise the possibility of cholecystitis. Clinical and laboratory correlation are recommended.    CBC with mild leukocytosis (13.20) otherwise unremarkable.  INR at presentation was 4.51, currently 3.20.  He " has been holding warfarin since admission.    01/19/24  Hematology/Oncology was consulted for further recommendations.  Patient denies any bleeding issues.  Denies nausea or vomiting.  Patient is on scheduled for cholecystectomy on Monday.    He/She  has a past medical history of Hyperlipidemia, Hypertension, and Marfan's disease.    PCP: Wally Henderson MD    History:  Past Medical History:   Diagnosis Date    Hyperlipidemia     Hypertension     Marfan's disease    , History reviewed. No pertinent surgical history., History reviewed. No pertinent family history.,   Social History     Tobacco Use    Smoking status: Every Day     Packs/day: .5     Types: Cigarettes    Smokeless tobacco: Never   Vaping Use    Vaping Use: Some days   Substance Use Topics    Alcohol use: Yes    Drug use: Yes     Types: Marijuana   ,   Medications Prior to Admission   Medication Sig Dispense Refill Last Dose    atorvastatin (LIPITOR) 10 MG tablet Take 1 tablet by mouth Daily.   1/15/2024    losartan (COZAAR) 100 MG tablet Take 1 tablet by mouth Daily.   1/15/2024    sertraline (ZOLOFT) 100 MG tablet Take 2 tablets by mouth Daily.   1/15/2024    warfarin (COUMADIN) 7.5 MG tablet Take 1 tablet by mouth Every Night. Tuesday, Thursday, Saturday, Sunday   Past Week   , Scheduled Meds:  atorvastatin, 10 mg, Oral, Nightly  losartan, 100 mg, Oral, Daily  pantoprazole, 40 mg, Intravenous, BID AC  polyethylene glycol, 17 g, Oral, BID  senna-docusate sodium, 2 tablet, Oral, BID  sertraline, 200 mg, Oral, Daily  sodium chloride, 10 mL, Intravenous, Q12H    , Continuous Infusions:  Pharmacy Consult - Pharmacy to dose,   sodium chloride, 125 mL/hr, Last Rate: 125 mL/hr (01/18/24 1635)    , PRN Meds:    acetaminophen **OR** acetaminophen **OR** acetaminophen    aluminum-magnesium hydroxide-simethicone    senna-docusate sodium **AND** polyethylene glycol **AND** bisacodyl **AND** bisacodyl    Calcium Replacement - Follow Nurse / BPA Driven  "Protocol    dicyclomine    Magnesium Standard Dose Replacement - Follow Nurse / BPA Driven Protocol    metoclopramide    nitroglycerin    ondansetron    Pharmacy Consult - Pharmacy to dose    Phosphorus Replacement - Follow Nurse / BPA Driven Protocol    Potassium Replacement - Follow Nurse / BPA Driven Protocol    [COMPLETED] Insert Peripheral IV **AND** sodium chloride    sodium chloride    sodium chloride   Allergies:  Latex    Subjective     ROS:  Review of Systems   Constitutional:  Positive for fatigue. Negative for chills and fever.   HENT:  Negative for congestion, drooling, ear discharge, rhinorrhea, sinus pressure and tinnitus.    Eyes:  Negative for photophobia, pain and discharge.   Respiratory:  Negative for apnea, choking and stridor.    Cardiovascular:  Negative for palpitations.   Gastrointestinal:  Positive for abdominal pain. Negative for abdominal distention and anal bleeding.   Endocrine: Negative for polydipsia and polyphagia.   Genitourinary:  Negative for decreased urine volume, flank pain and genital sores.   Musculoskeletal:  Negative for gait problem, neck pain and neck stiffness.   Skin:  Negative for color change, rash and wound.   Neurological:  Negative for tremors, seizures, syncope, facial asymmetry and speech difficulty.   Hematological:  Negative for adenopathy.   Psychiatric/Behavioral:  Negative for agitation, confusion, hallucinations and self-injury. The patient is not hyperactive.         Objective   Vital Signs:   /69 (BP Location: Right arm, Patient Position: Lying)   Pulse 62   Temp 98.3 °F (36.8 °C) (Oral)   Resp 15   Ht 190.5 cm (75\")   Wt 77.1 kg (169 lb 15.6 oz)   SpO2 96%   BMI 21.25 kg/m²     Physical Exam: (performed by MD)  Physical Exam  Vitals and nursing note reviewed.   Constitutional:       General: He is not in acute distress.     Appearance: He is ill-appearing. He is not diaphoretic.   HENT:      Head: Normocephalic and atraumatic.   Eyes:      " General: No scleral icterus.        Right eye: No discharge.         Left eye: No discharge.      Conjunctiva/sclera: Conjunctivae normal.   Neck:      Thyroid: No thyromegaly.   Cardiovascular:      Rate and Rhythm: Normal rate and regular rhythm.      Heart sounds: Normal heart sounds.      No friction rub. No gallop.   Pulmonary:      Effort: Pulmonary effort is normal. No respiratory distress.      Breath sounds: No stridor. No wheezing.   Abdominal:      General: Bowel sounds are normal.      Palpations: Abdomen is soft. There is no mass.      Tenderness: There is no abdominal tenderness. There is no guarding or rebound.   Musculoskeletal:         General: No tenderness. Normal range of motion.      Cervical back: Normal range of motion and neck supple.   Lymphadenopathy:      Cervical: No cervical adenopathy.   Skin:     General: Skin is warm.      Findings: No erythema or rash.   Neurological:      Mental Status: He is alert and oriented to person, place, and time.      Motor: No abnormal muscle tone.   Psychiatric:         Behavior: Behavior normal.         Results Review:  Lab Results (last 48 hours)       Procedure Component Value Units Date/Time    Magnesium [[id number]]  (Normal) Collected: 01/19/24 0442    Specimen: Blood from Arm, Left Updated: 01/19/24 0831     Magnesium 1.8 mg/dL     Phosphorus [[id number]]  (Abnormal) Collected: 01/19/24 0442    Specimen: Blood from Arm, Left Updated: 01/19/24 0831     Phosphorus 2.2 mg/dL     CBC & Differential [[id number]]  (Abnormal) Collected: 01/19/24 0442    Specimen: Blood from Arm, Left Updated: 01/19/24 0606    Narrative:      The following orders were created for panel order CBC & Differential.  Procedure                               Abnormality         Status                     ---------                               -----------         ------                     CBC Auto Differential[[id number]]        Abnormal            Final result               Scan  Slide[[id number]]                                       Final result                 Please view results for these tests on the individual orders.    CBC Auto Differential [[id number]]  (Abnormal) Collected: 01/19/24 0442    Specimen: Blood from Arm, Left Updated: 01/19/24 0606     WBC 13.20 10*3/mm3      RBC 5.41 10*6/mm3      Hemoglobin 16.2 g/dL      Hematocrit 48.1 %      MCV 88.9 fL      MCH 29.9 pg      MCHC 33.6 g/dL      RDW 14.3 %      RDW-SD 43.8 fl      MPV 9.7 fL      Platelets 201 10*3/mm3     Narrative:      The previously reported component NRBC is no longer being reported. Previous result was 0.0 /100 WBC (Reference Range: 0.0-0.2 /100 WBC) on 1/19/2024 at 0527 EST.    Scan Slide [[id number]] Collected: 01/19/24 0442    Specimen: Blood from Arm, Left Updated: 01/19/24 0606     Scan Slide --     Comment: See Manual Differential Results       Manual Differential [[id number]]  (Abnormal) Collected: 01/19/24 0442    Specimen: Blood from Arm, Left Updated: 01/19/24 0606     Neutrophil % 79.0 %      Lymphocyte % 6.0 %      Monocyte % 9.0 %      Basophil % 1.0 %      Bands %  3.0 %      Atypical Lymphocyte % 2.0 %      Neutrophils Absolute 10.82 10*3/mm3      Lymphocytes Absolute 1.06 10*3/mm3      Monocytes Absolute 1.19 10*3/mm3      Basophils Absolute 0.13 10*3/mm3      RBC Morphology Normal     Toxic Granulation Slight/1+     Large Platelets Slight/1+    Comprehensive Metabolic Panel [[id number]]  (Abnormal) Collected: 01/19/24 0442    Specimen: Blood from Arm, Left Updated: 01/19/24 0534     Glucose 95 mg/dL      BUN 14 mg/dL      Creatinine 0.61 mg/dL      Sodium 135 mmol/L      Potassium 3.4 mmol/L      Chloride 99 mmol/L      CO2 25.0 mmol/L      Calcium 9.4 mg/dL      Total Protein 7.0 g/dL      Albumin 3.9 g/dL      ALT (SGPT) 30 U/L      AST (SGOT) 23 U/L      Alkaline Phosphatase 67 U/L      Total Bilirubin 1.0 mg/dL      Globulin 3.1 gm/dL      A/G Ratio 1.3 g/dL      BUN/Creatinine Ratio 23.0      Anion Gap 11.0 mmol/L      eGFR 125.3 mL/min/1.73     Narrative:      GFR Normal >60  Chronic Kidney Disease <60  Kidney Failure <15      Protime-INR [[id number]]  (Abnormal) Collected: 01/19/24 0442    Specimen: Blood from Arm, Left Updated: 01/19/24 0515     Protime 32.0 Seconds      INR 3.20    Anti-Smooth Muscle Antibody Titer [[id number]] Collected: 01/17/24 1450    Specimen: Blood from Arm, Left Updated: 01/18/24 1309     Smooth Muscle Ab 12 Units      Comment:                  Negative                     0 - 19                   Weak positive               20 - 30                   Moderate to strong positive     >30   Actin Antibodies are found in 52-85% of patients with   autoimmune hepatitis or chronic active hepatitis and   in 22% of patients with primary biliary cirrhosis.       Narrative:      Performed at:  [address]  [id number]  : Cosmo Roldan PhD, Phone:  [phone]    Mitochondrial Antibodies, M2 [[id number]] Collected: 01/17/24 1450    Specimen: Blood from Arm, Left Updated: 01/18/24 1309     Mitochondrial Ab <20.0 Units      Comment:                                 Negative    0.0 - 20.0                                  Equivocal  20.1 - 24.9                                  Positive         >24.9  Mitochondrial (M2) Antibodies are found in 90-96% of  patients with primary biliary cirrhosis.       Narrative:      Performed at:  [address]  [id number]  : Cosmo Roldan PhD, Phone:  [phone]    Lipase [[id number]]  (Normal) Collected: 01/18/24 0345    Specimen: Blood from Arm, Left Updated: 01/18/24 1218     Lipase 31 U/L     Comprehensive Metabolic Panel [[id number]]  (Abnormal) Collected: 01/18/24 0345    Specimen: Blood from Arm, Left Updated: 01/18/24 0502     Glucose 109 mg/dL      BUN 8 mg/dL      Creatinine 0.57 mg/dL      Sodium 135 mmol/L      Potassium 3.6 mmol/L      Chloride 100  mmol/L      CO2 26.0 mmol/L      Calcium 9.4 mg/dL      Total Protein 7.3 g/dL      Albumin 4.3 g/dL      ALT (SGPT) 36 U/L      AST (SGOT) 33 U/L      Alkaline Phosphatase 67 U/L      Total Bilirubin 1.1 mg/dL      Globulin 3.0 gm/dL      A/G Ratio 1.4 g/dL      BUN/Creatinine Ratio 14.0     Anion Gap 9.0 mmol/L      eGFR 127.9 mL/min/1.73     Narrative:      GFR Normal >60  Chronic Kidney Disease <60  Kidney Failure <15      Protime-INR [[id number]]  (Abnormal) Collected: 01/18/24 0345    Specimen: Blood from Arm, Left Updated: 01/18/24 0450     Protime 52.4 Seconds      INR 5.45    CBC & Differential [[id number]]  (Abnormal) Collected: 01/18/24 0345    Specimen: Blood from Arm, Left Updated: 01/18/24 0448    Narrative:      The following orders were created for panel order CBC & Differential.  Procedure                               Abnormality         Status                     ---------                               -----------         ------                     CBC Auto Differential[[id number]]        Abnormal            Final result                 Please view results for these tests on the individual orders.    CBC Auto Differential [[id number]]  (Abnormal) Collected: 01/18/24 0345    Specimen: Blood from Arm, Left Updated: 01/18/24 0448     WBC 19.10 10*3/mm3      RBC 5.63 10*6/mm3      Hemoglobin 17.0 g/dL      Hematocrit 50.2 %      MCV 89.2 fL      MCH 30.3 pg      MCHC 33.9 g/dL      RDW 13.9 %      RDW-SD 42.4 fl      MPV 10.2 fL      Platelets 218 10*3/mm3      Neutrophil % 82.2 %      Lymphocyte % 6.8 %      Monocyte % 10.6 %      Eosinophil % 0.1 %      Basophil % 0.3 %      Neutrophils, Absolute 15.70 10*3/mm3      Lymphocytes, Absolute 1.30 10*3/mm3      Monocytes, Absolute 2.00 10*3/mm3      Eosinophils, Absolute 0.00 10*3/mm3      Basophils, Absolute 0.10 10*3/mm3      nRBC 0.1 /100 WBC     Alpha - 1 - Antitrypsin [[id number]]  (Normal) Collected: 01/17/24 0358    Specimen: Blood from Arm, Right  Updated: 01/17/24 2043     ALPHA -1 ANTITRYPSIN 182 mg/dL     Ceruloplasmin [[id number]]  (Normal) Collected: 01/17/24 0358    Specimen: Blood from Arm, Right Updated: 01/17/24 2043     Ceruloplasmin 24 mg/dL     Gamma GT [[id number]]  (Normal) Collected: 01/17/24 0358    Specimen: Blood from Arm, Right Updated: 01/17/24 2043     GGT 28 U/L     Hepatitis Panel, Acute [[id number]]  (Normal) Collected: 01/17/24 1450    Specimen: Blood from Arm, Left Updated: 01/17/24 1837     Hepatitis B Surface Ag Non-Reactive     Hep A IgM Non-Reactive     Hep B C IgM Non-Reactive     Hepatitis C Ab Non-Reactive    Narrative:      Results may be falsely decreased if patient taking Biotin.     Ferritin [[id number]]  (Normal) Collected: 01/17/24 0358    Specimen: Blood from Arm, Right Updated: 01/17/24 1550     Ferritin 206.60 ng/mL     Narrative:      Results may be falsely decreased if patient taking Biotin.      Gastrointestinal Panel, PCR - Stool, Per Rectum [[id number]]  (Normal) Collected: 01/17/24 1329    Specimen: Stool from Per Rectum Updated: 01/17/24 1501     Campylobacter Not Detected     Plesiomonas shigelloides Not Detected     Salmonella Not Detected     Vibrio Not Detected     Vibrio cholerae Not Detected     Yersinia enterocolitica Not Detected     Enteroaggregative E. coli (EAEC) Not Detected     Enteropathogenic E. coli (EPEC) Not Detected     Enterotoxigenic E. coli (ETEC) lt/st Not Detected     Shiga-like toxin-producing E. coli (STEC) stx1/stx2 Not Detected     Shigella/Enteroinvasive E. coli (EIEC) Not Detected     Cryptosporidium Not Detected     Cyclospora cayetanensis Not Detected     Entamoeba histolytica Not Detected     Giardia lamblia Not Detected     Adenovirus F40/41 Not Detected     Astrovirus Not Detected     Norovirus GI/GII Not Detected     Rotavirus A Not Detected     Sapovirus (I, II, IV or V) Not Detected    Narrative:      If Aeromonas, Staphylococcus aureus or Bacillus cereus are suspected, please  "order [id number]: Stool Culture, Aeromonas, S aureus, B Cereus.    PATRIA [[id number]] Collected: 01/17/24 1450    Specimen: Blood from Arm, Left Updated: 01/17/24 1500             Pending Results:     Imaging Reviewed:   US Liver    Result Date: 1/17/2024  Impression: Cholelithiasis with gallbladder sludge. There is gallbladder wall thickening with mild pericholecystic fluid. These findings raise the possibility of cholecystitis. Clinical and laboratory correlation are recommended. Electronically Signed: Cherrie Amato MD  1/17/2024 3:56 PM EST  Workstation ID: [id number]    CT Angiogram Abdomen Pelvis    Result Date: 1/16/2024  Impression: 1. No evidence of aortoiliac aneurysm or stenosis. 2. Shotty, somewhat numerous lymph nodes in the small bowel mesentery, which may be reactive, possibly viral mesenteric lymphadenitis. 3. Fecalization of distal small bowel, with no visible obstruction. Short segment of distal ileum with numerous diverticuli. Although this is somewhat unusual, there is no visible inflammatory change to suggest small bowel diverticulitis. Consider follow-up scan if patient symptoms persist or worsen. 4. Questionable gallbladder \"sludge\". No visible gallbladder wall inflammation or evidence of biliary ductal dilatation. 5. No evidence of potential inflammatory focus, bowel obstruction, obstructive uropathy, or other acute disease is seen. Electronically Signed: Cesar Bernal MD  1/16/2024 8:55 AM EST  Workstation ID: [id number]          Assessment & Plan   ASSESSMENT    Supratherapeutic INR  Marfan's syndrome  Mechanical valve (aortic)  - Patient on long-term anticoagulation with Warfarin for above. Patient reports dose is 7.5mg daily.    - INR on admission 4.51 -> 5.45 -> 3.20.  - Warfarin has been held since 1/16. Today INR 3.20   - Cardiology consulted and recommends Lovenox bridge closer when INR closer to 2.5    Abdominal pain  Cholecystitis  - CT and US imaging, along with leukocytosis concerning for " cholecystitis.   - General surgery has been consulted and is holding off on surgery until INR is less than or equal to 1.5      PLAN  Continue to hold warfarin and trend INR per cardiology recommendations  Continue to monitor CBC  Surgery planned for Monday    Electronically signed by Jenny Clemente PA-C, 01/19/24    Thank you for this consult. We will be happy to follow along with you.       James Rodriguez is a 38 y.o. male with PMH of Marfan's syndrome, mechanical aortic valve replacement 1998, chronic anticoagulation, hypertension, hyperlipidemia who presented to [facility] on 1/16/2024 with complaints of abdominal pain.  Ports symptoms started 2 days ago and progressively was worsening.  He was found to have cholelithiasis with possible cholecystitis and surgery was consulted for possible cholecystectomy.  However INR was supratherapeutic and therefore surgery was postponed.    We have been consulted to weigh in on his anticoagulation therapy.  Patient has been seen by cardiologist and Lovenox has been initiated.    Physical exam otherwise is essentially unremarkable    Reviewed his labs, imaging studies and progress notes his INR is down to 3.2.  Patient is not bleeding at this time.  He has been transition to Lovenox with plans to perform cholecystectomy early next week.  He remains on IV antibiotics while waiting for his INR today subtherapeutic for safe surgery.  Will monitor his counts very closely we will continue to follow along with you  Discussed with patient    Electronically signed by Matilda Recinos MD, 01/19/24, 4:40 PM EST.

## 2024-01-20 LAB
ALBUMIN SERPL-MCNC: 3.7 G/DL (ref 3.5–5.2)
ALBUMIN/GLOB SERPL: 1.1 G/DL
ALP SERPL-CCNC: 64 U/L (ref 39–117)
ALT SERPL W P-5'-P-CCNC: 29 U/L (ref 1–41)
ANION GAP SERPL CALCULATED.3IONS-SCNC: 9 MMOL/L (ref 5–15)
AST SERPL-CCNC: 25 U/L (ref 1–40)
BASOPHILS # BLD AUTO: 0.1 10*3/MM3 (ref 0–0.2)
BASOPHILS NFR BLD AUTO: 0.8 % (ref 0–1.5)
BILIRUB SERPL-MCNC: 0.8 MG/DL (ref 0–1.2)
BUN SERPL-MCNC: 13 MG/DL (ref 6–20)
BUN/CREAT SERPL: 22 (ref 7–25)
CALCIUM SPEC-SCNC: 9.5 MG/DL (ref 8.6–10.5)
CHLORIDE SERPL-SCNC: 100 MMOL/L (ref 98–107)
CO2 SERPL-SCNC: 27 MMOL/L (ref 22–29)
CREAT SERPL-MCNC: 0.59 MG/DL (ref 0.76–1.27)
DEPRECATED RDW RBC AUTO: 44.6 FL (ref 37–54)
EGFRCR SERPLBLD CKD-EPI 2021: 126.6 ML/MIN/1.73
EOSINOPHIL # BLD AUTO: 0.3 10*3/MM3 (ref 0–0.4)
EOSINOPHIL NFR BLD AUTO: 3.4 % (ref 0.3–6.2)
ERYTHROCYTE [DISTWIDTH] IN BLOOD BY AUTOMATED COUNT: 13.9 % (ref 12.3–15.4)
GLOBULIN UR ELPH-MCNC: 3.3 GM/DL
GLUCOSE SERPL-MCNC: 90 MG/DL (ref 65–99)
HCT VFR BLD AUTO: 45.4 % (ref 37.5–51)
HGB BLD-MCNC: 15.6 G/DL (ref 13–17.7)
INR PPP: 1.98 (ref 2–3)
LYMPHOCYTES # BLD AUTO: 1.6 10*3/MM3 (ref 0.7–3.1)
LYMPHOCYTES NFR BLD AUTO: 16.7 % (ref 19.6–45.3)
MAGNESIUM SERPL-MCNC: 1.9 MG/DL (ref 1.6–2.6)
MCH RBC QN AUTO: 30.2 PG (ref 26.6–33)
MCHC RBC AUTO-ENTMCNC: 34.4 G/DL (ref 31.5–35.7)
MCV RBC AUTO: 87.7 FL (ref 79–97)
MONOCYTES # BLD AUTO: 0.8 10*3/MM3 (ref 0.1–0.9)
MONOCYTES NFR BLD AUTO: 8.8 % (ref 5–12)
NEUTROPHILS NFR BLD AUTO: 6.7 10*3/MM3 (ref 1.7–7)
NEUTROPHILS NFR BLD AUTO: 70.3 % (ref 42.7–76)
NRBC BLD AUTO-RTO: 0.1 /100 WBC (ref 0–0.2)
PHOSPHATE SERPL-MCNC: 2.8 MG/DL (ref 2.5–4.5)
PLATELET # BLD AUTO: 208 10*3/MM3 (ref 140–450)
PMV BLD AUTO: 9.4 FL (ref 6–12)
POTASSIUM SERPL-SCNC: 3.5 MMOL/L (ref 3.5–5.2)
POTASSIUM SERPL-SCNC: 4.1 MMOL/L (ref 3.5–5.2)
PROT SERPL-MCNC: 7 G/DL (ref 6–8.5)
PROTHROMBIN TIME: 20.5 SECONDS (ref 19.4–28.5)
RBC # BLD AUTO: 5.18 10*6/MM3 (ref 4.14–5.8)
SODIUM SERPL-SCNC: 136 MMOL/L (ref 136–145)
WBC NRBC COR # BLD AUTO: 9.5 10*3/MM3 (ref 3.4–10.8)

## 2024-01-20 PROCEDURE — 84100 ASSAY OF PHOSPHORUS: CPT | Performed by: INTERNAL MEDICINE

## 2024-01-20 PROCEDURE — 85610 PROTHROMBIN TIME: CPT | Performed by: NURSE PRACTITIONER

## 2024-01-20 PROCEDURE — 25010000002 ENOXAPARIN PER 10 MG: Performed by: INTERNAL MEDICINE

## 2024-01-20 PROCEDURE — 84132 ASSAY OF SERUM POTASSIUM: CPT | Performed by: INTERNAL MEDICINE

## 2024-01-20 PROCEDURE — 99232 SBSQ HOSP IP/OBS MODERATE 35: CPT | Performed by: INTERNAL MEDICINE

## 2024-01-20 PROCEDURE — 85025 COMPLETE CBC W/AUTO DIFF WBC: CPT | Performed by: INTERNAL MEDICINE

## 2024-01-20 PROCEDURE — 80053 COMPREHEN METABOLIC PANEL: CPT | Performed by: INTERNAL MEDICINE

## 2024-01-20 PROCEDURE — 83735 ASSAY OF MAGNESIUM: CPT | Performed by: INTERNAL MEDICINE

## 2024-01-20 PROCEDURE — 25010000002 PIPERACILLIN SOD-TAZOBACTAM PER 1 G: Performed by: SURGERY

## 2024-01-20 PROCEDURE — 25810000003 SODIUM CHLORIDE 0.9 % SOLUTION: Performed by: NURSE PRACTITIONER

## 2024-01-20 RX ORDER — ENOXAPARIN SODIUM 100 MG/ML
1 INJECTION SUBCUTANEOUS EVERY 12 HOURS
Status: DISCONTINUED | OUTPATIENT
Start: 2024-01-20 | End: 2024-01-25 | Stop reason: HOSPADM

## 2024-01-20 RX ORDER — ENOXAPARIN SODIUM 100 MG/ML
1 INJECTION SUBCUTANEOUS EVERY 12 HOURS
Status: DISCONTINUED | OUTPATIENT
Start: 2024-01-20 | End: 2024-01-20

## 2024-01-20 RX ORDER — POTASSIUM CHLORIDE 20 MEQ/1
40 TABLET, EXTENDED RELEASE ORAL EVERY 4 HOURS
Status: COMPLETED | OUTPATIENT
Start: 2024-01-20 | End: 2024-01-20

## 2024-01-20 RX ADMIN — POLYETHYLENE GLYCOL 3350 17 G: 17 POWDER, FOR SOLUTION ORAL at 21:15

## 2024-01-20 RX ADMIN — ENOXAPARIN SODIUM 80 MG: 100 INJECTION SUBCUTANEOUS at 09:18

## 2024-01-20 RX ADMIN — ENOXAPARIN SODIUM 70 MG: 100 INJECTION SUBCUTANEOUS at 21:15

## 2024-01-20 RX ADMIN — SODIUM CHLORIDE 125 ML/HR: 9 INJECTION, SOLUTION INTRAVENOUS at 08:05

## 2024-01-20 RX ADMIN — DOCUSATE SODIUM AND SENNOSIDES 2 TABLET: 8.6; 5 TABLET, FILM COATED ORAL at 09:38

## 2024-01-20 RX ADMIN — SERTRALINE 200 MG: 100 TABLET, FILM COATED ORAL at 21:15

## 2024-01-20 RX ADMIN — POTASSIUM CHLORIDE 40 MEQ: 1500 TABLET, EXTENDED RELEASE ORAL at 11:56

## 2024-01-20 RX ADMIN — ACETAMINOPHEN 650 MG: 325 TABLET, FILM COATED ORAL at 09:17

## 2024-01-20 RX ADMIN — PANTOPRAZOLE SODIUM 40 MG: 40 INJECTION, POWDER, LYOPHILIZED, FOR SOLUTION INTRAVENOUS at 08:05

## 2024-01-20 RX ADMIN — Medication 10 ML: at 21:15

## 2024-01-20 RX ADMIN — LOSARTAN POTASSIUM 100 MG: 50 TABLET, FILM COATED ORAL at 09:17

## 2024-01-20 RX ADMIN — PIPERACILLIN AND TAZOBACTAM 3.38 G: 3; .375 INJECTION, POWDER, LYOPHILIZED, FOR SOLUTION INTRAVENOUS at 00:54

## 2024-01-20 RX ADMIN — PIPERACILLIN AND TAZOBACTAM 3.38 G: 3; .375 INJECTION, POWDER, LYOPHILIZED, FOR SOLUTION INTRAVENOUS at 17:27

## 2024-01-20 RX ADMIN — ATORVASTATIN CALCIUM 10 MG: 10 TABLET, FILM COATED ORAL at 21:15

## 2024-01-20 RX ADMIN — POTASSIUM CHLORIDE 40 MEQ: 1500 TABLET, EXTENDED RELEASE ORAL at 08:05

## 2024-01-20 RX ADMIN — Medication 10 ML: at 08:05

## 2024-01-20 RX ADMIN — PANTOPRAZOLE SODIUM 40 MG: 40 INJECTION, POWDER, LYOPHILIZED, FOR SOLUTION INTRAVENOUS at 17:27

## 2024-01-20 RX ADMIN — PIPERACILLIN AND TAZOBACTAM 3.38 G: 3; .375 INJECTION, POWDER, LYOPHILIZED, FOR SOLUTION INTRAVENOUS at 09:18

## 2024-01-20 RX ADMIN — POLYETHYLENE GLYCOL 3350 17 G: 17 POWDER, FOR SOLUTION ORAL at 09:38

## 2024-01-20 RX ADMIN — ACETAMINOPHEN 650 MG: 325 TABLET, FILM COATED ORAL at 21:15

## 2024-01-20 NOTE — PLAN OF CARE
Goal Outcome Evaluation:  Plan of Care Reviewed With: patient        Progress: improving  Outcome Evaluation: Patient currently denies pain or nausea. Patient has tolerated his full liquid diet today. His INR has improved but is not yet to the level that they would like it to be prior to having his gallbladder removed. Will repeat labs in the am.Patient is receiving IV antibiotics and fluids. Potassium replaced today.

## 2024-01-20 NOTE — PROGRESS NOTES
INR 1.98 this morning.  Will consult pharmacy to dose Lovenox until Coumadin resumed and therapeutic after planned cholecystectomy Monday.

## 2024-01-20 NOTE — PLAN OF CARE
Problem: Adult Inpatient Plan of Care  Goal: Absence of Hospital-Acquired Illness or Injury  Outcome: Ongoing, Progressing  Intervention: Identify and Manage Fall Risk  Recent Flowsheet Documentation  Taken 1/20/2024 0400 by Cande Turner RN  Safety Promotion/Fall Prevention:   safety round/check completed   assistive device/personal items within reach   clutter free environment maintained   nonskid shoes/slippers when out of bed  Taken 1/20/2024 0203 by Cande Turner RN  Safety Promotion/Fall Prevention:   safety round/check completed   assistive device/personal items within reach   elopement precautions   nonskid shoes/slippers when out of bed  Taken 1/20/2024 0000 by Cande Turner RN  Safety Promotion/Fall Prevention:   safety round/check completed   assistive device/personal items within reach   elopement precautions   nonskid shoes/slippers when out of bed  Taken 1/19/2024 2208 by Cande Turner RN  Safety Promotion/Fall Prevention:   safety round/check completed   assistive device/personal items within reach   clutter free environment maintained   nonskid shoes/slippers when out of bed  Taken 1/19/2024 2000 by Cande Turner RN  Safety Promotion/Fall Prevention:   safety round/check completed   assistive device/personal items within reach   clutter free environment maintained   nonskid shoes/slippers when out of bed  Intervention: Prevent Skin Injury  Recent Flowsheet Documentation  Taken 1/19/2024 2000 by Cande Turner RN  Body Position: position changed independently  Skin Protection: adhesive use limited  Intervention: Prevent and Manage VTE (Venous Thromboembolism) Risk  Recent Flowsheet Documentation  Taken 1/19/2024 2000 by Cande Turner RN  Activity Management: up ad yann  VTE Prevention/Management: patient refused intervention  Range of Motion: active ROM (range of motion) encouraged  Intervention: Prevent Infection  Recent Flowsheet Documentation  Taken 1/20/2024 0400 by Cande Turner RN  Infection  Prevention:   hand hygiene promoted   rest/sleep promoted   single patient room provided  Taken 1/20/2024 0203 by Cande Turner RN  Infection Prevention:   hand hygiene promoted   rest/sleep promoted   single patient room provided  Taken 1/20/2024 0000 by Cande Turner RN  Infection Prevention:   hand hygiene promoted   rest/sleep promoted   single patient room provided  Taken 1/19/2024 2208 by Cande Turner RN  Infection Prevention:   hand hygiene promoted   rest/sleep promoted   single patient room provided  Taken 1/19/2024 2000 by Cande Turner RN  Infection Prevention:   hand hygiene promoted   rest/sleep promoted   single patient room provided     Problem: Pain Acute  Goal: Acceptable Pain Control and Functional Ability  Outcome: Ongoing, Progressing  Intervention: Prevent or Manage Pain  Recent Flowsheet Documentation  Taken 1/19/2024 2000 by Cande Turner RN  Bowel Elimination Promotion: adequate fluid intake promoted  Medication Review/Management: medications reviewed  Intervention: Optimize Psychosocial Wellbeing  Recent Flowsheet Documentation  Taken 1/19/2024 2000 by Cande Turner RN  Supportive Measures: active listening utilized  Diversional Activities:   television   smartphone   Goal Outcome Evaluation:    Pt has started on IV antibiotics. No complaints of pain. Will continue to monitor.

## 2024-01-20 NOTE — PROGRESS NOTES
[facility] Medicine Services Daily Progress Note    Patient Name: James Rodriguez  : 1985  MRN: [id number]  Primary Care Physician:  Wally Henderson MD  Date of admission: 2024      Subjective      Chief Complaint: Nausea vomiting      Patient Reports he is tolerating a full liquid diet.  INR 1.9.  Bridging with Lovenox while holding Coumadin for cholecystectomy Monday.  No other complaints at this time.    ROS negative except as above      Objective      Vitals:   Temp:  [97.7 °F (36.5 °C)-98.7 °F (37.1 °C)] 97.7 °F (36.5 °C)  Heart Rate:  [57-63] 57  Resp:  [12-18] 18  BP: (117-137)/(61-80) 117/61    Physical Exam  Vitals reviewed.   Constitutional:       Comments: Patient has strabismus  Laying in bed no distress  Marfanoid body habitus   HENT:      Head: Normocephalic.   Cardiovascular:      Rate and Rhythm: Normal rate.   Pulmonary:      Effort: Pulmonary effort is normal.   Abdominal:      General: Abdomen is flat.      Palpations: Abdomen is soft.   Musculoskeletal:         General: Normal range of motion.      Cervical back: Normal range of motion.   Skin:     General: Skin is warm.   Neurological:      General: No focal deficit present.      Mental Status: He is alert and oriented to person, place, and time.   Psychiatric:         Mood and Affect: Mood normal.         Behavior: Behavior normal.             Result Review    Result Review:  I have personally reviewed the results from the time of this admission to 2024 14:38 EST and agree with these findings:  [x]  Laboratory  []  Microbiology  []  Radiology  []  EKG/Telemetry   []  Cardiology/Vascular   []  Pathology  []  Old records  []  Other:  Most notable findings include: INR is 1.9      Assessment & Plan      Brief Patient Summary:  James Rodriguez is a 38 y.o. male who presents with nausea vomiting possible cholecystitis/choledocholithiasis      atorvastatin, 10 mg, Oral, Nightly  enoxaparin, 1 mg/kg,  Subcutaneous, Q12H  losartan, 100 mg, Oral, Daily  pantoprazole, 40 mg, Intravenous, BID AC  piperacillin-tazobactam, 3.375 g, Intravenous, Q8H  polyethylene glycol, 17 g, Oral, BID  senna-docusate sodium, 2 tablet, Oral, BID  sertraline, 200 mg, Oral, Daily  sodium chloride, 10 mL, Intravenous, Q12H       Pharmacy Consult - Pharmacy to dose,   sodium chloride, 125 mL/hr, Last Rate: 125 mL/hr (01/20/24 0805)         Active Hospital Problems:  Active Hospital Problems    Diagnosis     **Vomiting     Marfan's syndrome     Essential hypertension     Right upper quadrant pain      Plan:   Continue IV fluids  Resume diet for now  INR came down nicely to 1.9.  Lovenox bridging in progress  Pending cholecystectomy with general surgery Monday tentatively  Need INR below 2 monitor every morning  Perioperative valve management per cardiology and hematology appreciate assistance.    DVT prophylaxis:  Medical and mechanical DVT prophylaxis orders are present.    CODE STATUS:    Level Of Support Discussed With: Patient  Code Status (Patient has no pulse and is not breathing): CPR (Attempt to Resuscitate)  Medical Interventions (Patient has pulse or is breathing): Full Support        This patient has been examined wearing appropriate Personal Protective Equipment and discussed with  patient and staff . 01/20/24      Electronically signed by Reed Ceja MD, 01/20/24, 14:38 EST.  [facility]ist Team

## 2024-01-20 NOTE — PROGRESS NOTES
"      Hematology/Oncology Inpatient Progress Note    PATIENT NAME: James Rodriguez  : 1985  MRN: [id number]    CHIEF COMPLAINT: Stomach pain    HISTORY OF PRESENT ILLNESS:    Expand All Collapse All[]Expand All by Default               Hematology/Oncology Inpatient Consultation     Patient name: James Rodriguez  : 1985  MRN: [id number]  Referring Provider: Adri Crowley PA-C  Reason for Consultation: Elevated INR     Chief complaint: Stomach pain     History of present illness:    James Rodriguez is a 38 y.o. male with PMH of Marfan's syndrome, mechanical aortic valve replacement , chronic anticoagulation, hypertension, hyperlipidemia who presented to [facility] on 2024 with complaints of abdominal pain. Reports symptoms started 2 days ago and progressively was worsening.  He was found to have cholelithiasis with possible cholecystitis and surgery was consulted for possible cholecystectomy.  However INR was supratherapeutic and therefore surgery was postponed.     2024 CT angiogram abdominal pelvis  Impression:   1. No evidence of aortoiliac aneurysm or stenosis.   2. Shotty, somewhat numerous lymph nodes in the small bowel mesentery, which may be reactive, possibly viral mesenteric lymphadenitis.   3. Fecalization of distal small bowel, with no visible obstruction. Short segment of distal ileum with numerous diverticuli. Although this is somewhat unusual, there is no visible inflammatory change to suggest small bowel diverticulitis. Consider   follow-up scan if patient symptoms persist or worsen.   4. Questionable gallbladder \"sludge\". No visible gallbladder wall inflammation or evidence of biliary ductal dilatation.   5. No evidence of potential inflammatory focus, bowel obstruction, obstructive uropathy, or other acute disease is seen.     2024 liver ultrasound  Impression:  Cholelithiasis with gallbladder sludge. There is gallbladder wall thickening with mild " pericholecystic fluid. These findings raise the possibility of cholecystitis. Clinical and laboratory correlation are recommended.     CBC with mild leukocytosis (13.20) otherwise unremarkable.  INR at presentation was 4.51, currently 3.20.  He has been holding warfarin since admission.     01/19/24  Hematology/Oncology was consulted for further recommendations.  Patient denies any bleeding issues.  Denies nausea or vomiting.  Patient is on scheduled for cholecystectomy on Monday.     He/She  has a past medical history of Hyperlipidemia, Hypertension, and Marfan's disease.     PCP: Wally Henderson MD          Subjective     Denies any new issues today.  Denies abdominal pain    ROS:  Review of Systems   Constitutional:  Positive for fatigue.   Neurological:  Positive for weakness.      MEDICATIONS:    Scheduled Meds:  atorvastatin, 10 mg, Oral, Nightly  enoxaparin, 1 mg/kg, Subcutaneous, Q12H  losartan, 100 mg, Oral, Daily  pantoprazole, 40 mg, Intravenous, BID AC  piperacillin-tazobactam, 3.375 g, Intravenous, Q8H  polyethylene glycol, 17 g, Oral, BID  potassium chloride ER, 40 mEq, Oral, Q4H  senna-docusate sodium, 2 tablet, Oral, BID  sertraline, 200 mg, Oral, Daily  sodium chloride, 10 mL, Intravenous, Q12H       Continuous Infusions:  Pharmacy Consult - Pharmacy to dose,   sodium chloride, 125 mL/hr, Last Rate: 125 mL/hr (01/20/24 0805)       PRN Meds:    acetaminophen **OR** acetaminophen **OR** acetaminophen    aluminum-magnesium hydroxide-simethicone    senna-docusate sodium **AND** polyethylene glycol **AND** bisacodyl **AND** bisacodyl    Calcium Replacement - Follow Nurse / BPA Driven Protocol    dicyclomine    Magnesium Standard Dose Replacement - Follow Nurse / BPA Driven Protocol    metoclopramide    nitroglycerin    ondansetron    Pharmacy Consult - Pharmacy to dose    Phosphorus Replacement - Follow Nurse / BPA Driven Protocol    Potassium Replacement - Follow Nurse / BPA Driven Protocol     "[COMPLETED] Insert Peripheral IV **AND** sodium chloride    sodium chloride    sodium chloride     ALLERGIES:    Allergies   Allergen Reactions    Latex Other (See Comments)     Primary care provider        Objective    VITALS:   /80 (BP Location: Left arm, Patient Position: Lying)   Pulse 57   Temp 98.7 °F (37.1 °C) (Oral)   Resp 16   Ht 190.5 cm (75\")   Wt 76.7 kg (169 lb)   SpO2 95%   BMI 21.12 kg/m²     PHYSICAL EXAM: (performed by MD)  Physical Exam  Vitals and nursing note reviewed.   Constitutional:       General: He is not in acute distress.     Appearance: He is not diaphoretic.   HENT:      Head: Normocephalic and atraumatic.   Eyes:      General: No scleral icterus.        Right eye: No discharge.         Left eye: No discharge.      Conjunctiva/sclera: Conjunctivae normal.   Neck:      Thyroid: No thyromegaly.   Cardiovascular:      Rate and Rhythm: Normal rate and regular rhythm.      Heart sounds: Normal heart sounds.      No friction rub. No gallop.   Pulmonary:      Effort: Pulmonary effort is normal. No respiratory distress.      Breath sounds: No stridor. No wheezing.   Abdominal:      General: Bowel sounds are normal.      Palpations: Abdomen is soft. There is no mass.      Tenderness: There is no abdominal tenderness. There is no guarding or rebound.   Musculoskeletal:         General: No tenderness. Normal range of motion.      Cervical back: Normal range of motion and neck supple.   Lymphadenopathy:      Cervical: No cervical adenopathy.   Skin:     General: Skin is warm.      Findings: No erythema or rash.   Neurological:      Mental Status: He is alert and oriented to person, place, and time.      Motor: No abnormal muscle tone.   Psychiatric:         Behavior: Behavior normal.           RECENT LABS:  Lab Results (last 24 hours)       Procedure Component Value Units Date/Time    Phosphorus [[id number]]  (Normal) Collected: 01/20/24 0451    Specimen: Blood from Arm, Left Updated: " 01/20/24 0550     Phosphorus 2.8 mg/dL     Comprehensive Metabolic Panel [[id number]]  (Abnormal) Collected: 01/20/24 0451    Specimen: Blood from Arm, Left Updated: 01/20/24 0549     Glucose 90 mg/dL      BUN 13 mg/dL      Creatinine 0.59 mg/dL      Sodium 136 mmol/L      Potassium 3.5 mmol/L      Chloride 100 mmol/L      CO2 27.0 mmol/L      Calcium 9.5 mg/dL      Total Protein 7.0 g/dL      Albumin 3.7 g/dL      ALT (SGPT) 29 U/L      AST (SGOT) 25 U/L      Alkaline Phosphatase 64 U/L      Total Bilirubin 0.8 mg/dL      Globulin 3.3 gm/dL      A/G Ratio 1.1 g/dL      BUN/Creatinine Ratio 22.0     Anion Gap 9.0 mmol/L      eGFR 126.6 mL/min/1.73     Narrative:      GFR Normal >60  Chronic Kidney Disease <60  Kidney Failure <15      Magnesium [[id number]]  (Normal) Collected: 01/20/24 0451    Specimen: Blood from Arm, Left Updated: 01/20/24 0549     Magnesium 1.9 mg/dL     Protime-INR [[id number]]  (Abnormal) Collected: 01/20/24 0451    Specimen: Blood from Arm, Left Updated: 01/20/24 0548     Protime 20.5 Seconds      INR 1.98    CBC & Differential [[id number]]  (Abnormal) Collected: 01/20/24 0451    Specimen: Blood from Arm, Left Updated: 01/20/24 0533    Narrative:      The following orders were created for panel order CBC & Differential.  Procedure                               Abnormality         Status                     ---------                               -----------         ------                     CBC Auto Differential[[id number]]        Abnormal            Final result                 Please view results for these tests on the individual orders.    CBC Auto Differential [[id number]]  (Abnormal) Collected: 01/20/24 0451    Specimen: Blood from Arm, Left Updated: 01/20/24 0533     WBC 9.50 10*3/mm3      RBC 5.18 10*6/mm3      Hemoglobin 15.6 g/dL      Hematocrit 45.4 %      MCV 87.7 fL      MCH 30.2 pg      MCHC 34.4 g/dL      RDW 13.9 %      RDW-SD 44.6 fl      MPV 9.4 fL      Platelets 208 10*3/mm3       Neutrophil % 70.3 %      Lymphocyte % 16.7 %      Monocyte % 8.8 %      Eosinophil % 3.4 %      Basophil % 0.8 %      Neutrophils, Absolute 6.70 10*3/mm3      Lymphocytes, Absolute 1.60 10*3/mm3      Monocytes, Absolute 0.80 10*3/mm3      Eosinophils, Absolute 0.30 10*3/mm3      Basophils, Absolute 0.10 10*3/mm3      nRBC 0.1 /100 WBC             PENDING RESULTS:     IMAGING REVIEWED:  No radiology results for the last day    Assessment & Plan   ASSESSMENT:  Supratherapeutic INR  Marfan's syndrome  Mechanical valve (aortic)  - Patient on long-term anticoagulation with Warfarin for above. Patient reports dose is 7.5mg daily.    - INR on admission 4.51 -> 5.45 -> 3.20.  - Warfarin has been held since 1/16. 1/19/2024-INR 3.20-->1.98  - Cardiology consulted and recommends Lovenox bridge closer when INR closer to 2.5  - Lovenox bridge started 1/20/2024     Abdominal pain  Cholecystitis  - CT and US imaging, along with leukocytosis concerning for cholecystitis.   - General surgery has been consulted and is holding off on surgery until INR is less than or equal to 1.5    PLAN:  Continue to hold warfarin and trend INR per cardiology recommendations  Continue to monitor CBC  Surgery planned for Monday          James Rodriguez is a 38 y.o. male with PMH of Marfan's syndrome, mechanical aortic valve replacement 1998, chronic anticoagulation, hypertension, hyperlipidemia who presented to [facility] on 1/16/2024 with complaints of abdominal pain.  Ports symptoms started 2 days ago and progressively was worsening.  He was found to have cholelithiasis with possible cholecystitis and surgery was consulted for possible cholecystectomy.  However INR was supratherapeutic and therefore surgery was postponed.     We have been consulted to weigh in on his anticoagulation therapy.  Patient has been seen by cardiologist and Lovenox has been initiated.     Physical exam otherwise is essentially unremarkable     Reviewed his labs,  imaging studies and progress notes.  Patient is not bleeding at this time.  He has been transition to Lovenox with plans to perform cholecystectomy early next week.  He remains on IV antibiotics while waiting for his INR today subtherapeutic for safe surgery.    CBC reviewed  INR is down to 1.9  Continue daily labs with INR  Continue Lovenox for now  Continue to hold warfarin  Surgery planned for Monday  Continue to follow along    Discussed with patient    Electronically signed by Matilda Recinos MD, 01/20/24, 1:36 PM EST.

## 2024-01-20 NOTE — PROGRESS NOTES
"    Reason for follow-up: Aortic valve replacement, anticoagulation management     Patient Care Team:  Wally Henderson MD as PCP - General (Internal Medicine)    Subjective .   James Rodriguez is doing well today     ROS    Latex    Scheduled Meds:atorvastatin, 10 mg, Oral, Nightly  enoxaparin, 1 mg/kg, Subcutaneous, Q12H  losartan, 100 mg, Oral, Daily  pantoprazole, 40 mg, Intravenous, BID AC  piperacillin-tazobactam, 3.375 g, Intravenous, Q8H  polyethylene glycol, 17 g, Oral, BID  senna-docusate sodium, 2 tablet, Oral, BID  sertraline, 200 mg, Oral, Daily  sodium chloride, 10 mL, Intravenous, Q12H      Continuous Infusions:Pharmacy Consult - Pharmacy to dose,   sodium chloride, 125 mL/hr, Last Rate: 125 mL/hr (01/20/24 0805)      PRN Meds:.  acetaminophen **OR** acetaminophen **OR** acetaminophen    aluminum-magnesium hydroxide-simethicone    senna-docusate sodium **AND** polyethylene glycol **AND** bisacodyl **AND** bisacodyl    Calcium Replacement - Follow Nurse / BPA Driven Protocol    dicyclomine    Magnesium Standard Dose Replacement - Follow Nurse / BPA Driven Protocol    metoclopramide    nitroglycerin    ondansetron    Pharmacy Consult - Pharmacy to dose    Phosphorus Replacement - Follow Nurse / BPA Driven Protocol    Potassium Replacement - Follow Nurse / BPA Driven Protocol    [COMPLETED] Insert Peripheral IV **AND** sodium chloride    sodium chloride    sodium chloride      VITAL SIGNS  Vitals:    01/19/24 2208 01/20/24 0203 01/20/24 0615 01/20/24 1119   BP: 137/79 133/68 122/80 117/61   BP Location: Left arm Left arm Left arm Left arm   Patient Position: Lying Lying Lying Lying   Pulse: 59 63 57 57   Resp: 16 18 16 18   Temp: 98.3 °F (36.8 °C) 98.3 °F (36.8 °C) 98.7 °F (37.1 °C) 97.7 °F (36.5 °C)   TempSrc: Oral Oral Oral Oral   SpO2: 92% 96% 95% 95%   Weight:       Height:           Flowsheet Rows      Flowsheet Row First Filed Value   Admission Height 190.5 cm (75\") Documented at 01/16/2024 " 0614   Admission Weight 77.1 kg (169 lb 15.6 oz) Documented at 01/16/2024 0614               Physical Exam  VITALS REVIEWED    General:      well developed, in no acute distress.    Head:      normocephalic and atraumatic.    Eyes:      PERRL/EOM intact, conjunctiva and sclera clear with out nystagmus.    Neck:      no masses, thyromegaly,  trachea central with normal respiratory effort and PMI displaced laterally  Lungs:      Clear  Heart:       Regular rate and rhythm  Msk:      no deformity or scoliosis noted of thoracic or lumbar spine.    Pulses:      pulses normal in all 4 extremities.    Extremities:       No lower extremity edema  Neurologic:      no focal deficits.   alert oriented x3  Skin:      intact without lesions or rashes.    Psych:      alert and cooperative; normal mood and affect; normal attention span and concentration.          LAB RESULTS (LAST 7 DAYS)    CBC  Results from last 7 days   Lab Units 01/20/24  0451 01/19/24 0442 01/18/24 0345 01/17/24 0358 01/16/24  0641   WBC 10*3/mm3 9.50 13.20* 19.10* 17.20* 16.30*   RBC 10*6/mm3 5.18 5.41 5.63 5.60 5.74   HEMOGLOBIN g/dL 15.6 16.2 17.0 16.8 17.1   HEMATOCRIT % 45.4 48.1 50.2 49.8 50.7   MCV fL 87.7 88.9 89.2 89.0 88.2   PLATELETS 10*3/mm3 208 201 218 222 223       BMP  Results from last 7 days   Lab Units 01/20/24  0451 01/19/24 0442 01/18/24 0345 01/17/24  0358 01/16/24  0641   SODIUM mmol/L 136 135* 135* 139 140   POTASSIUM mmol/L 3.5 3.4* 3.6 3.7 3.2*   CHLORIDE mmol/L 100 99 100 103 99   CO2 mmol/L 27.0 25.0 26.0 24.0 24.0   BUN mg/dL 13 14 8 8 14   CREATININE mg/dL 0.59* 0.61* 0.57* 0.53* 0.77   GLUCOSE mg/dL 90 95 109* 113* 171*   MAGNESIUM mg/dL 1.9 1.8  --   --  1.7   PHOSPHORUS mg/dL 2.8 2.2*  --   --   --        CMP   Results from last 7 days   Lab Units 01/20/24  0451 01/19/24  0442 01/18/24  0345 01/17/24  0358 01/16/24  0641   SODIUM mmol/L 136 135* 135* 139 140   POTASSIUM mmol/L 3.5 3.4* 3.6 3.7 3.2*   CHLORIDE mmol/L 100 99  100 103 99   CO2 mmol/L 27.0 25.0 26.0 24.0 24.0   BUN mg/dL 13 14 8 8 14   CREATININE mg/dL 0.59* 0.61* 0.57* 0.53* 0.77   GLUCOSE mg/dL 90 95 109* 113* 171*   ALBUMIN g/dL 3.7 3.9 4.3  --  4.8   BILIRUBIN mg/dL 0.8 1.0 1.1  --  0.5   ALK PHOS U/L 64 67 67  --  61   AST (SGOT) U/L 25 23 33  --  35   ALT (SGPT) U/L 29 30 36  --  47*   LIPASE U/L  --   --  31  --  28         BNP        TROPONIN        CoAg  Results from last 7 days   Lab Units 01/20/24  0451 01/19/24  0442 01/18/24  0345 01/17/24  0358 01/16/24  0641   INR  1.98* 3.20* 5.45* 4.66* 4.51*       Creatinine Clearance  Estimated Creatinine Clearance: 182.4 mL/min (A) (by C-G formula based on SCr of 0.59 mg/dL (L)).    ABG          EKG    I personally reviewed the patient's EKG/Telemetry data: Sinus rhythm      Assessment & Plan       Vomiting    Marfan's syndrome    Essential hypertension    Right upper quadrant pain      James Rodriguez is a 38-year-old male patient who has Marfan syndrome, has a mechanical valve replacement in 1998, has been on chronic anticoagulation with warfarin, presented to the hospital with abdominal pain and possible cholecystitis.  Anticipated surgery early next week.  Warfarin is discontinued, he is being bridged with full dose Lovenox twice daily.  Lovenox can be stopped the morning of the surgery and resumed along with warfarin as soon as possible.    I discussed the patients findings and my recommendations with patient and agrees with outlined plan.    Ilana Moore MD  01/20/24  12:07 EST

## 2024-01-21 LAB
ADV 40+41 DNA STL QL NAA+NON-PROBE: NOT DETECTED
ALBUMIN SERPL-MCNC: 4 G/DL (ref 3.5–5.2)
ALBUMIN/GLOB SERPL: 1.1 G/DL
ALP SERPL-CCNC: 69 U/L (ref 39–117)
ALT SERPL W P-5'-P-CCNC: 34 U/L (ref 1–41)
ANION GAP SERPL CALCULATED.3IONS-SCNC: 14 MMOL/L (ref 5–15)
AST SERPL-CCNC: 27 U/L (ref 1–40)
ASTRO TYP 1-8 RNA STL QL NAA+NON-PROBE: NOT DETECTED
B PARAPERT DNA SPEC QL NAA+PROBE: NOT DETECTED
B PERT DNA SPEC QL NAA+PROBE: NOT DETECTED
BASOPHILS # BLD AUTO: 0 10*3/MM3 (ref 0–0.2)
BASOPHILS NFR BLD AUTO: 0.3 % (ref 0–1.5)
BILIRUB SERPL-MCNC: 0.9 MG/DL (ref 0–1.2)
BUN SERPL-MCNC: 9 MG/DL (ref 6–20)
BUN/CREAT SERPL: 15.3 (ref 7–25)
C CAYETANENSIS DNA STL QL NAA+NON-PROBE: NOT DETECTED
C COLI+JEJ+UPSA DNA STL QL NAA+NON-PROBE: NOT DETECTED
C PNEUM DNA NPH QL NAA+NON-PROBE: NOT DETECTED
CALCIUM SPEC-SCNC: 9.9 MG/DL (ref 8.6–10.5)
CHLORIDE SERPL-SCNC: 99 MMOL/L (ref 98–107)
CO2 SERPL-SCNC: 23 MMOL/L (ref 22–29)
CREAT SERPL-MCNC: 0.59 MG/DL (ref 0.76–1.27)
CRYPTOSP DNA STL QL NAA+NON-PROBE: NOT DETECTED
DEPRECATED RDW RBC AUTO: 44.2 FL (ref 37–54)
E HISTOLYT DNA STL QL NAA+NON-PROBE: NOT DETECTED
EAEC PAA PLAS AGGR+AATA ST NAA+NON-PRB: NOT DETECTED
EC STX1+STX2 GENES STL QL NAA+NON-PROBE: NOT DETECTED
EGFRCR SERPLBLD CKD-EPI 2021: 126.6 ML/MIN/1.73
EOSINOPHIL # BLD AUTO: 0.1 10*3/MM3 (ref 0–0.4)
EOSINOPHIL NFR BLD AUTO: 0.5 % (ref 0.3–6.2)
EPEC EAE GENE STL QL NAA+NON-PROBE: NOT DETECTED
ERYTHROCYTE [DISTWIDTH] IN BLOOD BY AUTOMATED COUNT: 13.7 % (ref 12.3–15.4)
ETEC LTA+ST1A+ST1B TOX ST NAA+NON-PROBE: NOT DETECTED
FLUAV SUBTYP SPEC NAA+PROBE: NOT DETECTED
FLUBV RNA ISLT QL NAA+PROBE: NOT DETECTED
G LAMBLIA DNA STL QL NAA+NON-PROBE: NOT DETECTED
GLOBULIN UR ELPH-MCNC: 3.5 GM/DL
GLUCOSE SERPL-MCNC: 134 MG/DL (ref 65–99)
HADV DNA SPEC NAA+PROBE: NOT DETECTED
HCOV 229E RNA SPEC QL NAA+PROBE: NOT DETECTED
HCOV HKU1 RNA SPEC QL NAA+PROBE: NOT DETECTED
HCOV NL63 RNA SPEC QL NAA+PROBE: NOT DETECTED
HCOV OC43 RNA SPEC QL NAA+PROBE: NOT DETECTED
HCT VFR BLD AUTO: 49.9 % (ref 37.5–51)
HGB BLD-MCNC: 17 G/DL (ref 13–17.7)
HMPV RNA NPH QL NAA+NON-PROBE: NOT DETECTED
HPIV1 RNA ISLT QL NAA+PROBE: NOT DETECTED
HPIV2 RNA SPEC QL NAA+PROBE: NOT DETECTED
HPIV3 RNA NPH QL NAA+PROBE: NOT DETECTED
HPIV4 P GENE NPH QL NAA+PROBE: NOT DETECTED
INR PPP: 1.6 (ref 2–3)
LYMPHOCYTES # BLD AUTO: 0.8 10*3/MM3 (ref 0.7–3.1)
LYMPHOCYTES NFR BLD AUTO: 7.3 % (ref 19.6–45.3)
M PNEUMO IGG SER IA-ACNC: NOT DETECTED
MAGNESIUM SERPL-MCNC: 1.8 MG/DL (ref 1.6–2.6)
MCH RBC QN AUTO: 30.1 PG (ref 26.6–33)
MCHC RBC AUTO-ENTMCNC: 34.1 G/DL (ref 31.5–35.7)
MCV RBC AUTO: 88.4 FL (ref 79–97)
MONOCYTES # BLD AUTO: 0.7 10*3/MM3 (ref 0.1–0.9)
MONOCYTES NFR BLD AUTO: 6 % (ref 5–12)
NEUTROPHILS NFR BLD AUTO: 85.9 % (ref 42.7–76)
NEUTROPHILS NFR BLD AUTO: 9.4 10*3/MM3 (ref 1.7–7)
NOROVIRUS GI+II RNA STL QL NAA+NON-PROBE: NOT DETECTED
NRBC BLD AUTO-RTO: 0.2 /100 WBC (ref 0–0.2)
P SHIGELLOIDES DNA STL QL NAA+NON-PROBE: NOT DETECTED
PHOSPHATE SERPL-MCNC: 2.8 MG/DL (ref 2.5–4.5)
PLATELET # BLD AUTO: 270 10*3/MM3 (ref 140–450)
PMV BLD AUTO: 9.4 FL (ref 6–12)
POTASSIUM SERPL-SCNC: 3.8 MMOL/L (ref 3.5–5.2)
PROT SERPL-MCNC: 7.5 G/DL (ref 6–8.5)
PROTHROMBIN TIME: 16.8 SECONDS (ref 19.4–28.5)
RBC # BLD AUTO: 5.64 10*6/MM3 (ref 4.14–5.8)
RHINOVIRUS RNA SPEC NAA+PROBE: NOT DETECTED
RSV RNA NPH QL NAA+NON-PROBE: NOT DETECTED
RVA RNA STL QL NAA+NON-PROBE: NOT DETECTED
S ENT+BONG DNA STL QL NAA+NON-PROBE: NOT DETECTED
SAPO I+II+IV+V RNA STL QL NAA+NON-PROBE: NOT DETECTED
SARS-COV-2 RNA NPH QL NAA+NON-PROBE: NOT DETECTED
SHIGELLA SP+EIEC IPAH ST NAA+NON-PROBE: NOT DETECTED
SODIUM SERPL-SCNC: 136 MMOL/L (ref 136–145)
V CHOL+PARA+VUL DNA STL QL NAA+NON-PROBE: NOT DETECTED
V CHOLERAE DNA STL QL NAA+NON-PROBE: NOT DETECTED
WBC NRBC COR # BLD AUTO: 11 10*3/MM3 (ref 3.4–10.8)
Y ENTEROCOL DNA STL QL NAA+NON-PROBE: NOT DETECTED

## 2024-01-21 PROCEDURE — 0202U NFCT DS 22 TRGT SARS-COV-2: CPT | Performed by: INTERNAL MEDICINE

## 2024-01-21 PROCEDURE — 25810000003 SODIUM CHLORIDE 0.9 % SOLUTION: Performed by: NURSE PRACTITIONER

## 2024-01-21 PROCEDURE — 87507 IADNA-DNA/RNA PROBE TQ 12-25: CPT | Performed by: INTERNAL MEDICINE

## 2024-01-21 PROCEDURE — 99232 SBSQ HOSP IP/OBS MODERATE 35: CPT | Performed by: INTERNAL MEDICINE

## 2024-01-21 PROCEDURE — 80053 COMPREHEN METABOLIC PANEL: CPT | Performed by: INTERNAL MEDICINE

## 2024-01-21 PROCEDURE — 85025 COMPLETE CBC W/AUTO DIFF WBC: CPT | Performed by: INTERNAL MEDICINE

## 2024-01-21 PROCEDURE — 25010000002 PIPERACILLIN SOD-TAZOBACTAM PER 1 G: Performed by: SURGERY

## 2024-01-21 PROCEDURE — 25010000002 ENOXAPARIN PER 10 MG: Performed by: INTERNAL MEDICINE

## 2024-01-21 PROCEDURE — 84100 ASSAY OF PHOSPHORUS: CPT | Performed by: INTERNAL MEDICINE

## 2024-01-21 PROCEDURE — 83735 ASSAY OF MAGNESIUM: CPT | Performed by: INTERNAL MEDICINE

## 2024-01-21 PROCEDURE — 85610 PROTHROMBIN TIME: CPT | Performed by: INTERNAL MEDICINE

## 2024-01-21 RX ADMIN — PANTOPRAZOLE SODIUM 40 MG: 40 INJECTION, POWDER, LYOPHILIZED, FOR SOLUTION INTRAVENOUS at 09:27

## 2024-01-21 RX ADMIN — ENOXAPARIN SODIUM 70 MG: 100 INJECTION SUBCUTANEOUS at 22:30

## 2024-01-21 RX ADMIN — ATORVASTATIN CALCIUM 10 MG: 10 TABLET, FILM COATED ORAL at 20:18

## 2024-01-21 RX ADMIN — ENOXAPARIN SODIUM 70 MG: 100 INJECTION SUBCUTANEOUS at 09:28

## 2024-01-21 RX ADMIN — SODIUM CHLORIDE 125 ML/HR: 9 INJECTION, SOLUTION INTRAVENOUS at 00:25

## 2024-01-21 RX ADMIN — SERTRALINE 200 MG: 100 TABLET, FILM COATED ORAL at 20:18

## 2024-01-21 RX ADMIN — PIPERACILLIN AND TAZOBACTAM 3.38 G: 3; .375 INJECTION, POWDER, LYOPHILIZED, FOR SOLUTION INTRAVENOUS at 20:15

## 2024-01-21 RX ADMIN — ACETAMINOPHEN 650 MG: 325 TABLET, FILM COATED ORAL at 09:27

## 2024-01-21 RX ADMIN — PIPERACILLIN AND TAZOBACTAM 3.38 G: 3; .375 INJECTION, POWDER, LYOPHILIZED, FOR SOLUTION INTRAVENOUS at 09:27

## 2024-01-21 RX ADMIN — ACETAMINOPHEN 650 MG: 325 TABLET, FILM COATED ORAL at 01:35

## 2024-01-21 RX ADMIN — LOSARTAN POTASSIUM 100 MG: 50 TABLET, FILM COATED ORAL at 09:27

## 2024-01-21 RX ADMIN — Medication 10 ML: at 09:29

## 2024-01-21 RX ADMIN — PIPERACILLIN AND TAZOBACTAM 3.38 G: 3; .375 INJECTION, POWDER, LYOPHILIZED, FOR SOLUTION INTRAVENOUS at 00:30

## 2024-01-21 RX ADMIN — ACETAMINOPHEN 650 MG: 325 TABLET, FILM COATED ORAL at 20:17

## 2024-01-21 RX ADMIN — PANTOPRAZOLE SODIUM 40 MG: 40 INJECTION, POWDER, LYOPHILIZED, FOR SOLUTION INTRAVENOUS at 20:15

## 2024-01-21 NOTE — PROGRESS NOTES
"      Hematology/Oncology Inpatient Progress Note    PATIENT NAME: James Rodriguez  : 1985  MRN: [id number]    CHIEF COMPLAINT: Stomach pain    HISTORY OF PRESENT ILLNESS:    Expand All Collapse All[]Expand All by Default               Hematology/Oncology Inpatient Consultation     Patient name: James Rodriguez  : 1985  MRN: [id number]  Referring Provider: Adri Crowley PA-C  Reason for Consultation: Elevated INR     Chief complaint: Stomach pain     History of present illness:      James Rodriguez is a 38 y.o. male with PMH of Marfan's syndrome, mechanical aortic valve replacement , chronic anticoagulation, hypertension, hyperlipidemia who presented to [facility] on 2024 with complaints of abdominal pain. Reports symptoms started 2 days ago and progressively was worsening.  He was found to have cholelithiasis with possible cholecystitis and surgery was consulted for possible cholecystectomy.  However INR was supratherapeutic and therefore surgery was postponed.     2024 CT angiogram abdominal pelvis  Impression:   1. No evidence of aortoiliac aneurysm or stenosis.   2. Shotty, somewhat numerous lymph nodes in the small bowel mesentery, which may be reactive, possibly viral mesenteric lymphadenitis.   3. Fecalization of distal small bowel, with no visible obstruction. Short segment of distal ileum with numerous diverticuli. Although this is somewhat unusual, there is no visible inflammatory change to suggest small bowel diverticulitis. Consider   follow-up scan if patient symptoms persist or worsen.   4. Questionable gallbladder \"sludge\". No visible gallbladder wall inflammation or evidence of biliary ductal dilatation.   5. No evidence of potential inflammatory focus, bowel obstruction, obstructive uropathy, or other acute disease is seen.     2024 liver ultrasound  Impression:  Cholelithiasis with gallbladder sludge. There is gallbladder wall thickening with mild " pericholecystic fluid. These findings raise the possibility of cholecystitis. Clinical and laboratory correlation are recommended.     CBC with mild leukocytosis (13.20) otherwise unremarkable.  INR at presentation was 4.51, currently 3.20.  He has been holding warfarin since admission.     01/19/24  Hematology/Oncology was consulted for further recommendations.  Patient denies any bleeding issues.  Denies nausea or vomiting.  Patient is on scheduled for cholecystectomy on Monday.     He/She  has a past medical history of Hyperlipidemia, Hypertension, and Marfan's disease.     PCP: Wally Henderson MD          Subjective     Patient has no new issues today.    ROS:    Review of Systems   Constitutional:  Positive for fatigue.   Neurological:  Positive for weakness.      MEDICATIONS:    Scheduled Meds:  atorvastatin, 10 mg, Oral, Nightly  enoxaparin, 1 mg/kg, Subcutaneous, Q12H  losartan, 100 mg, Oral, Daily  pantoprazole, 40 mg, Intravenous, BID AC  piperacillin-tazobactam, 3.375 g, Intravenous, Q8H  polyethylene glycol, 17 g, Oral, BID  senna-docusate sodium, 2 tablet, Oral, BID  sertraline, 200 mg, Oral, Daily  sodium chloride, 10 mL, Intravenous, Q12H       Continuous Infusions:  Pharmacy Consult - Pharmacy to dose,   sodium chloride, 125 mL/hr, Last Rate: 125 mL/hr (01/21/24 0025)       PRN Meds:    acetaminophen **OR** acetaminophen **OR** acetaminophen    aluminum-magnesium hydroxide-simethicone    senna-docusate sodium **AND** polyethylene glycol **AND** bisacodyl **AND** bisacodyl    Calcium Replacement - Follow Nurse / BPA Driven Protocol    dicyclomine    Magnesium Standard Dose Replacement - Follow Nurse / BPA Driven Protocol    metoclopramide    nitroglycerin    ondansetron    Pharmacy Consult - Pharmacy to dose    Phosphorus Replacement - Follow Nurse / BPA Driven Protocol    Potassium Replacement - Follow Nurse / BPA Driven Protocol    [COMPLETED] Insert Peripheral IV **AND** sodium chloride     "sodium chloride    sodium chloride     ALLERGIES:    Allergies   Allergen Reactions    Latex Other (See Comments)     Primary care provider        Objective    VITALS:   /74 (BP Location: Right arm, Patient Position: Lying)   Pulse 52   Temp 98.1 °F (36.7 °C) (Oral)   Resp 20   Ht 190.5 cm (75\")   Wt 72.7 kg (160 lb 3.2 oz)   SpO2 98%   BMI 20.02 kg/m²     PHYSICAL EXAM: (performed by MD)  Physical Exam  Vitals and nursing note reviewed.   Constitutional:       General: He is not in acute distress.     Appearance: He is not diaphoretic.   HENT:      Head: Normocephalic and atraumatic.   Eyes:      General: No scleral icterus.        Right eye: No discharge.         Left eye: No discharge.      Conjunctiva/sclera: Conjunctivae normal.   Neck:      Thyroid: No thyromegaly.   Cardiovascular:      Rate and Rhythm: Normal rate and regular rhythm.      Heart sounds: Normal heart sounds.      No friction rub. No gallop.   Pulmonary:      Effort: Pulmonary effort is normal. No respiratory distress.      Breath sounds: No stridor. No wheezing.   Abdominal:      General: Bowel sounds are normal.      Palpations: Abdomen is soft. There is no mass.      Tenderness: There is no abdominal tenderness. There is no guarding or rebound.   Musculoskeletal:         General: No tenderness. Normal range of motion.      Cervical back: Normal range of motion and neck supple.   Lymphadenopathy:      Cervical: No cervical adenopathy.   Skin:     General: Skin is warm.      Findings: No erythema or rash.   Neurological:      Mental Status: He is alert and oriented to person, place, and time.      Motor: No abnormal muscle tone.   Psychiatric:         Behavior: Behavior normal.         I have reexamined the patient and the results are consistent with the previously documented exam. Matilda Recinos MD      RECENT LABS:  Lab Results (last 24 hours)       Procedure Component Value Units Date/Time    CBC & Differential " [[id number]]  (Abnormal) Collected: 01/21/24 0408    Specimen: Blood from Hand, Left Updated: 01/21/24 0511    Narrative:      The following orders were created for panel order CBC & Differential.  Procedure                               Abnormality         Status                     ---------                               -----------         ------                     CBC Auto Differential[[id number]]        Abnormal            Final result                 Please view results for these tests on the individual orders.    CBC Auto Differential [[id number]]  (Abnormal) Collected: 01/21/24 0408    Specimen: Blood from Hand, Left Updated: 01/21/24 0511     WBC 11.00 10*3/mm3      RBC 5.64 10*6/mm3      Hemoglobin 17.0 g/dL      Hematocrit 49.9 %      MCV 88.4 fL      MCH 30.1 pg      MCHC 34.1 g/dL      RDW 13.7 %      RDW-SD 44.2 fl      MPV 9.4 fL      Platelets 270 10*3/mm3      Neutrophil % 85.9 %      Lymphocyte % 7.3 %      Monocyte % 6.0 %      Eosinophil % 0.5 %      Basophil % 0.3 %      Neutrophils, Absolute 9.40 10*3/mm3      Lymphocytes, Absolute 0.80 10*3/mm3      Monocytes, Absolute 0.70 10*3/mm3      Eosinophils, Absolute 0.10 10*3/mm3      Basophils, Absolute 0.00 10*3/mm3      nRBC 0.2 /100 WBC     Comprehensive Metabolic Panel [[id number]]  (Abnormal) Collected: 01/21/24 0408    Specimen: Blood from Hand, Left Updated: 01/21/24 0510     Glucose 134 mg/dL      BUN 9 mg/dL      Creatinine 0.59 mg/dL      Sodium 136 mmol/L      Potassium 3.8 mmol/L      Comment: Slight hemolysis detected by analyzer. Result may be falsely elevated.        Chloride 99 mmol/L      CO2 23.0 mmol/L      Calcium 9.9 mg/dL      Total Protein 7.5 g/dL      Albumin 4.0 g/dL      ALT (SGPT) 34 U/L      AST (SGOT) 27 U/L      Alkaline Phosphatase 69 U/L      Total Bilirubin 0.9 mg/dL      Globulin 3.5 gm/dL      A/G Ratio 1.1 g/dL      BUN/Creatinine Ratio 15.3     Anion Gap 14.0 mmol/L      eGFR 126.6 mL/min/1.73     Narrative:       GFR Normal >60  Chronic Kidney Disease <60  Kidney Failure <15      Magnesium [[id number]]  (Normal) Collected: 01/21/24 0408    Specimen: Blood from Hand, Left Updated: 01/21/24 0510     Magnesium 1.8 mg/dL     Phosphorus [[id number]]  (Normal) Collected: 01/21/24 0408    Specimen: Blood from Hand, Left Updated: 01/21/24 0510     Phosphorus 2.8 mg/dL     Protime-INR [[id number]]  (Abnormal) Collected: 01/21/24 0408    Specimen: Blood from Hand, Left Updated: 01/21/24 0449     Protime 16.8 Seconds      INR 1.60    Potassium [[id number]]  (Normal) Collected: 01/20/24 1621    Specimen: Blood from Arm, Left Updated: 01/20/24 1653     Potassium 4.1 mmol/L             PENDING RESULTS:     IMAGING REVIEWED:  No radiology results for the last day    Assessment & Plan   ASSESSMENT:  Supratherapeutic INR  Marfan's syndrome  Mechanical valve (aortic)  - Patient on long-term anticoagulation with Warfarin for above. Patient reports dose is 7.5mg daily.    - INR on admission 4.51 -> 5.45 -> 3.20.  - Warfarin has been held since 1/16. 1/19/2024-INR 3.20--INR today is 1.6  - Cardiology consulted and recommends Lovenox bridge closer when INR closer to 2.5  - Lovenox bridge started 1/20/2024     Abdominal pain  Cholecystitis  - CT and US imaging, along with leukocytosis concerning for cholecystitis.   - General surgery has been consulted and is holding off on surgery until INR is less than or equal to 1.5    PLAN:  Continue to hold warfarin and trend INR per cardiology recommendations  Continue Lovenox  Daily CBCs and INR  Will continue to follow  Cholecystectomy planned for 1/22/2024          Electronically signed by Matilda Recinos MD, 01/21/24, 2:13 PM EST.

## 2024-01-21 NOTE — PROGRESS NOTES
[facility] Medicine Services Daily Progress Note    Patient Name: James Rodriguez  : 1985  MRN: [id number]  Primary Care Physician:  Wally Henderson MD  Date of admission: 2024      Subjective      Chief Complaint: Nausea vomiting      Patient Reports he is tolerating a full liquid diet.  INR 1.6.  Bridging with Lovenox while holding Coumadin for cholecystectomy Monday.  No other complaints at this time.  Patient did vomit last night after having vegetable soup.  He does complain of some right upper quadrant pain today.  Also complaining of chills respiratory viral panel ordered.    ROS negative except as above      Objective      Vitals:   Temp:  [97.2 °F (36.2 °C)-98.2 °F (36.8 °C)] 98.1 °F (36.7 °C)  Heart Rate:  [49-55] 52  Resp:  [16-22] 20  BP: (112-151)/(60-74) 151/74    Physical Exam  Vitals reviewed.   Constitutional:       Comments: Patient has strabismus  Laying in bed no distress  Marfanoid body habitus   HENT:      Head: Normocephalic.   Cardiovascular:      Rate and Rhythm: Normal rate.   Pulmonary:      Effort: Pulmonary effort is normal.   Abdominal:      General: Abdomen is flat.      Palpations: Abdomen is soft.   Musculoskeletal:         General: Normal range of motion.      Cervical back: Normal range of motion.   Skin:     General: Skin is warm.   Neurological:      General: No focal deficit present.      Mental Status: He is alert and oriented to person, place, and time.   Psychiatric:         Mood and Affect: Mood normal.         Behavior: Behavior normal.             Result Review    Result Review:  I have personally reviewed the results from the time of this admission to 2024 13:06 EST and agree with these findings:  [x]  Laboratory  []  Microbiology  []  Radiology  []  EKG/Telemetry   []  Cardiology/Vascular   []  Pathology  []  Old records  []  Other:  Most notable findings include: INR is 1.6      Assessment & Plan      Brief Patient  Summary:  James Rodriguez is a 38 y.o. male who presents with nausea vomiting possible cholecystitis/choledocholithiasis      atorvastatin, 10 mg, Oral, Nightly  enoxaparin, 1 mg/kg, Subcutaneous, Q12H  losartan, 100 mg, Oral, Daily  pantoprazole, 40 mg, Intravenous, BID AC  piperacillin-tazobactam, 3.375 g, Intravenous, Q8H  polyethylene glycol, 17 g, Oral, BID  senna-docusate sodium, 2 tablet, Oral, BID  sertraline, 200 mg, Oral, Daily  sodium chloride, 10 mL, Intravenous, Q12H       Pharmacy Consult - Pharmacy to dose,   sodium chloride, 125 mL/hr, Last Rate: 125 mL/hr (01/21/24 0025)         Active Hospital Problems:  Active Hospital Problems    Diagnosis     **Vomiting     Marfan's syndrome     Essential hypertension     Right upper quadrant pain      Plan:   Continue IV fluids  Resume diet for now n.p.o. after midnight for cholecystectomy January 22  INR came down nicely to 1.6.  Lovenox bridging in progress  Pending cholecystectomy with general surgery Monday   Perioperative valve management per cardiology and hematology appreciate assistance.      Check respiratory viral panel for night sweats and chills    DVT prophylaxis:  Medical and mechanical DVT prophylaxis orders are present.    CODE STATUS:    Level Of Support Discussed With: Patient  Code Status (Patient has no pulse and is not breathing): CPR (Attempt to Resuscitate)  Medical Interventions (Patient has pulse or is breathing): Full Support        This patient has been examined wearing appropriate Personal Protective Equipment and discussed with  patient and staff . 01/21/24      Electronically signed by Reed Ceja MD, 01/21/24, 13:06 EST.  [facility] Hospitalist Team

## 2024-01-21 NOTE — PLAN OF CARE
Goal Outcome Evaluation:  Plan of Care Reviewed With: patient        Progress: no change  Outcome Evaluation: Pt rested throughout the night still complaining of abd pain but is controlled with PO pain medication. Pt has labs due this am will see what INR is. Pt continues on IV abx and IVF's will await further orders from MD

## 2024-01-22 ENCOUNTER — ANESTHESIA (OUTPATIENT)
Dept: PERIOP | Facility: HOSPITAL | Age: 39
End: 2024-01-22
Payer: MEDICARE

## 2024-01-22 ENCOUNTER — ANESTHESIA EVENT (OUTPATIENT)
Dept: PERIOP | Facility: HOSPITAL | Age: 39
End: 2024-01-22
Payer: MEDICARE

## 2024-01-22 PROBLEM — R10.11 RIGHT UPPER QUADRANT PAIN: Status: RESOLVED | Noted: 2024-01-19 | Resolved: 2024-01-22

## 2024-01-22 LAB
ABO GROUP BLD: NORMAL
ALBUMIN SERPL-MCNC: 3.7 G/DL (ref 3.5–5.2)
ALBUMIN/GLOB SERPL: 1.2 G/DL
ALP SERPL-CCNC: 54 U/L (ref 39–117)
ALT SERPL W P-5'-P-CCNC: 29 U/L (ref 1–41)
ANION GAP SERPL CALCULATED.3IONS-SCNC: 10 MMOL/L (ref 5–15)
AST SERPL-CCNC: 22 U/L (ref 1–40)
BASOPHILS # BLD AUTO: 0.1 10*3/MM3 (ref 0–0.2)
BASOPHILS NFR BLD AUTO: 0.8 % (ref 0–1.5)
BILIRUB SERPL-MCNC: 0.7 MG/DL (ref 0–1.2)
BLD GP AB SCN SERPL QL: NEGATIVE
BUN SERPL-MCNC: 12 MG/DL (ref 6–20)
BUN/CREAT SERPL: 18.2 (ref 7–25)
CALCIUM SPEC-SCNC: 9.6 MG/DL (ref 8.6–10.5)
CHLORIDE SERPL-SCNC: 102 MMOL/L (ref 98–107)
CO2 SERPL-SCNC: 26 MMOL/L (ref 22–29)
CREAT SERPL-MCNC: 0.66 MG/DL (ref 0.76–1.27)
DEPRECATED RDW RBC AUTO: 42 FL (ref 37–54)
EGFRCR SERPLBLD CKD-EPI 2021: 122.4 ML/MIN/1.73
EOSINOPHIL # BLD AUTO: 0.2 10*3/MM3 (ref 0–0.4)
EOSINOPHIL NFR BLD AUTO: 3 % (ref 0.3–6.2)
ERYTHROCYTE [DISTWIDTH] IN BLOOD BY AUTOMATED COUNT: 13.7 % (ref 12.3–15.4)
GLOBULIN UR ELPH-MCNC: 3 GM/DL
GLUCOSE SERPL-MCNC: 88 MG/DL (ref 65–99)
HCT VFR BLD AUTO: 43 % (ref 37.5–51)
HGB BLD-MCNC: 14.3 G/DL (ref 13–17.7)
INR PPP: 1.25 (ref 0.93–1.1)
INR PPP: 2 (ref 2–3)
LYMPHOCYTES # BLD AUTO: 1.9 10*3/MM3 (ref 0.7–3.1)
LYMPHOCYTES NFR BLD AUTO: 25.9 % (ref 19.6–45.3)
MAGNESIUM SERPL-MCNC: 2 MG/DL (ref 1.6–2.6)
MCH RBC QN AUTO: 29.7 PG (ref 26.6–33)
MCHC RBC AUTO-ENTMCNC: 33.2 G/DL (ref 31.5–35.7)
MCV RBC AUTO: 89.3 FL (ref 79–97)
MONOCYTES # BLD AUTO: 0.8 10*3/MM3 (ref 0.1–0.9)
MONOCYTES NFR BLD AUTO: 10.3 % (ref 5–12)
NEUTROPHILS NFR BLD AUTO: 4.4 10*3/MM3 (ref 1.7–7)
NEUTROPHILS NFR BLD AUTO: 60 % (ref 42.7–76)
NRBC BLD AUTO-RTO: 0.1 /100 WBC (ref 0–0.2)
PHOSPHATE SERPL-MCNC: 3.3 MG/DL (ref 2.5–4.5)
PLATELET # BLD AUTO: 252 10*3/MM3 (ref 140–450)
PMV BLD AUTO: 8.8 FL (ref 6–12)
POTASSIUM SERPL-SCNC: 3.7 MMOL/L (ref 3.5–5.2)
PROT SERPL-MCNC: 6.7 G/DL (ref 6–8.5)
PROTHROMBIN TIME: 13.4 SECONDS (ref 9.6–11.7)
PROTHROMBIN TIME: 20.7 SECONDS (ref 19.4–28.5)
RBC # BLD AUTO: 4.81 10*6/MM3 (ref 4.14–5.8)
RH BLD: POSITIVE
SODIUM SERPL-SCNC: 138 MMOL/L (ref 136–145)
T&S EXPIRATION DATE: NORMAL
WBC NRBC COR # BLD AUTO: 7.4 10*3/MM3 (ref 3.4–10.8)

## 2024-01-22 PROCEDURE — 47562 LAPAROSCOPIC CHOLECYSTECTOMY: CPT | Performed by: SURGERY

## 2024-01-22 PROCEDURE — P9059 PLASMA, FRZ BETWEEN 8-24HOUR: HCPCS

## 2024-01-22 PROCEDURE — 25010000002 PROPOFOL 200 MG/20ML EMULSION: Performed by: NURSE ANESTHETIST, CERTIFIED REGISTERED

## 2024-01-22 PROCEDURE — 86900 BLOOD TYPING SEROLOGIC ABO: CPT

## 2024-01-22 PROCEDURE — 25010000002 ONDANSETRON PER 1 MG: Performed by: NURSE ANESTHETIST, CERTIFIED REGISTERED

## 2024-01-22 PROCEDURE — 25010000002 INDOCYANINE GREEN 25 MG RECONSTITUTED SOLUTION: Performed by: SURGERY

## 2024-01-22 PROCEDURE — 85610 PROTHROMBIN TIME: CPT | Performed by: SURGERY

## 2024-01-22 PROCEDURE — 99232 SBSQ HOSP IP/OBS MODERATE 35: CPT | Performed by: INTERNAL MEDICINE

## 2024-01-22 PROCEDURE — 36430 TRANSFUSION BLD/BLD COMPNT: CPT

## 2024-01-22 PROCEDURE — 25810000003 LACTATED RINGERS PER 1000 ML: Performed by: ANESTHESIOLOGY

## 2024-01-22 PROCEDURE — 8E0W4CZ ROBOTIC ASSISTED PROCEDURE OF TRUNK REGION, PERCUTANEOUS ENDOSCOPIC APPROACH: ICD-10-PCS | Performed by: SURGERY

## 2024-01-22 PROCEDURE — 47562 LAPAROSCOPIC CHOLECYSTECTOMY: CPT | Performed by: NURSE PRACTITIONER

## 2024-01-22 PROCEDURE — 0FB44ZZ EXCISION OF GALLBLADDER, PERCUTANEOUS ENDOSCOPIC APPROACH: ICD-10-PCS | Performed by: SURGERY

## 2024-01-22 PROCEDURE — 25010000002 ENOXAPARIN PER 10 MG: Performed by: SURGERY

## 2024-01-22 PROCEDURE — 86901 BLOOD TYPING SEROLOGIC RH(D): CPT

## 2024-01-22 PROCEDURE — 84100 ASSAY OF PHOSPHORUS: CPT

## 2024-01-22 PROCEDURE — 25010000002 SUGAMMADEX 200 MG/2ML SOLUTION: Performed by: NURSE ANESTHETIST, CERTIFIED REGISTERED

## 2024-01-22 PROCEDURE — 25010000002 BUPIVACAINE 0.25 % SOLUTION: Performed by: SURGERY

## 2024-01-22 PROCEDURE — 25010000002 MIDAZOLAM PER 1 MG: Performed by: NURSE ANESTHETIST, CERTIFIED REGISTERED

## 2024-01-22 PROCEDURE — 83735 ASSAY OF MAGNESIUM: CPT

## 2024-01-22 PROCEDURE — 25010000002 HYDRALAZINE PER 20 MG: Performed by: NURSE ANESTHETIST, CERTIFIED REGISTERED

## 2024-01-22 PROCEDURE — S2900 ROBOTIC SURGICAL SYSTEM: HCPCS | Performed by: SURGERY

## 2024-01-22 PROCEDURE — 86927 PLASMA FRESH FROZEN: CPT

## 2024-01-22 PROCEDURE — 80053 COMPREHEN METABOLIC PANEL: CPT

## 2024-01-22 PROCEDURE — 85610 PROTHROMBIN TIME: CPT

## 2024-01-22 PROCEDURE — 25010000002 DEXAMETHASONE PER 1 MG: Performed by: NURSE ANESTHETIST, CERTIFIED REGISTERED

## 2024-01-22 PROCEDURE — 25010000002 PIPERACILLIN SOD-TAZOBACTAM PER 1 G: Performed by: SURGERY

## 2024-01-22 PROCEDURE — 88304 TISSUE EXAM BY PATHOLOGIST: CPT | Performed by: SURGERY

## 2024-01-22 PROCEDURE — 25010000002 HYDROMORPHONE 1 MG/ML SOLUTION: Performed by: NURSE ANESTHETIST, CERTIFIED REGISTERED

## 2024-01-22 PROCEDURE — 25010000002 FENTANYL CITRATE (PF) 250 MCG/5ML SOLUTION: Performed by: NURSE ANESTHETIST, CERTIFIED REGISTERED

## 2024-01-22 PROCEDURE — 86850 RBC ANTIBODY SCREEN: CPT

## 2024-01-22 PROCEDURE — 85025 COMPLETE CBC W/AUTO DIFF WBC: CPT

## 2024-01-22 DEVICE — HEMOST ABS SURGICEL ORIG 2X14IN STRL: Type: IMPLANTABLE DEVICE | Site: ABDOMEN | Status: FUNCTIONAL

## 2024-01-22 RX ORDER — HYDROCODONE BITARTRATE AND ACETAMINOPHEN 5; 325 MG/1; MG/1
1 TABLET ORAL EVERY 6 HOURS PRN
Status: DISCONTINUED | OUTPATIENT
Start: 2024-01-22 | End: 2024-01-23

## 2024-01-22 RX ORDER — MIDAZOLAM HYDROCHLORIDE 1 MG/ML
INJECTION INTRAMUSCULAR; INTRAVENOUS AS NEEDED
Status: DISCONTINUED | OUTPATIENT
Start: 2024-01-22 | End: 2024-01-22 | Stop reason: SURG

## 2024-01-22 RX ORDER — FLUMAZENIL 0.1 MG/ML
0.1 INJECTION INTRAVENOUS AS NEEDED
Status: DISCONTINUED | OUTPATIENT
Start: 2024-01-22 | End: 2024-01-22 | Stop reason: HOSPADM

## 2024-01-22 RX ORDER — ALBUTEROL SULFATE 2.5 MG/3ML
2.5 SOLUTION RESPIRATORY (INHALATION) ONCE AS NEEDED
Status: DISCONTINUED | OUTPATIENT
Start: 2024-01-22 | End: 2024-01-22 | Stop reason: HOSPADM

## 2024-01-22 RX ORDER — LABETALOL HYDROCHLORIDE 5 MG/ML
INJECTION, SOLUTION INTRAVENOUS AS NEEDED
Status: DISCONTINUED | OUTPATIENT
Start: 2024-01-22 | End: 2024-01-22 | Stop reason: SURG

## 2024-01-22 RX ORDER — DIPHENHYDRAMINE HYDROCHLORIDE 50 MG/ML
12.5 INJECTION INTRAMUSCULAR; INTRAVENOUS
Status: DISCONTINUED | OUTPATIENT
Start: 2024-01-22 | End: 2024-01-22 | Stop reason: HOSPADM

## 2024-01-22 RX ORDER — KETAMINE HCL IN NACL, ISO-OSM 100MG/10ML
SYRINGE (ML) INJECTION AS NEEDED
Status: DISCONTINUED | OUTPATIENT
Start: 2024-01-22 | End: 2024-01-22 | Stop reason: SURG

## 2024-01-22 RX ORDER — SODIUM CHLORIDE 0.9 % (FLUSH) 0.9 %
10 SYRINGE (ML) INJECTION EVERY 12 HOURS SCHEDULED
Status: DISCONTINUED | OUTPATIENT
Start: 2024-01-22 | End: 2024-01-22 | Stop reason: HOSPADM

## 2024-01-22 RX ORDER — ONDANSETRON 2 MG/ML
INJECTION INTRAMUSCULAR; INTRAVENOUS AS NEEDED
Status: DISCONTINUED | OUTPATIENT
Start: 2024-01-22 | End: 2024-01-22 | Stop reason: SURG

## 2024-01-22 RX ORDER — HYDRALAZINE HYDROCHLORIDE 20 MG/ML
5 INJECTION INTRAMUSCULAR; INTRAVENOUS
Status: DISCONTINUED | OUTPATIENT
Start: 2024-01-22 | End: 2024-01-22 | Stop reason: HOSPADM

## 2024-01-22 RX ORDER — HYDROCODONE BITARTRATE AND ACETAMINOPHEN 7.5; 325 MG/1; MG/1
1 TABLET ORAL EVERY 4 HOURS PRN
Status: DISCONTINUED | OUTPATIENT
Start: 2024-01-22 | End: 2024-01-22 | Stop reason: HOSPADM

## 2024-01-22 RX ORDER — ROCURONIUM BROMIDE 10 MG/ML
INJECTION, SOLUTION INTRAVENOUS AS NEEDED
Status: DISCONTINUED | OUTPATIENT
Start: 2024-01-22 | End: 2024-01-22 | Stop reason: SURG

## 2024-01-22 RX ORDER — LABETALOL HYDROCHLORIDE 5 MG/ML
5 INJECTION, SOLUTION INTRAVENOUS
Status: DISCONTINUED | OUTPATIENT
Start: 2024-01-22 | End: 2024-01-22 | Stop reason: HOSPADM

## 2024-01-22 RX ORDER — BUPIVACAINE HYDROCHLORIDE 2.5 MG/ML
INJECTION, SOLUTION INFILTRATION; PERINEURAL AS NEEDED
Status: DISCONTINUED | OUTPATIENT
Start: 2024-01-22 | End: 2024-01-22 | Stop reason: HOSPADM

## 2024-01-22 RX ORDER — SODIUM CHLORIDE 0.9 % (FLUSH) 0.9 %
10 SYRINGE (ML) INJECTION AS NEEDED
Status: DISCONTINUED | OUTPATIENT
Start: 2024-01-22 | End: 2024-01-22 | Stop reason: HOSPADM

## 2024-01-22 RX ORDER — ENOXAPARIN SODIUM 100 MG/ML
1 INJECTION SUBCUTANEOUS EVERY 12 HOURS
Status: CANCELLED | OUTPATIENT
Start: 2024-01-22

## 2024-01-22 RX ORDER — ACETAMINOPHEN 650 MG/1
325 SUPPOSITORY RECTAL EVERY 4 HOURS PRN
Status: DISCONTINUED | OUTPATIENT
Start: 2024-01-22 | End: 2024-01-22 | Stop reason: HOSPADM

## 2024-01-22 RX ORDER — NALOXONE HCL 0.4 MG/ML
0.4 VIAL (ML) INJECTION AS NEEDED
Status: DISCONTINUED | OUTPATIENT
Start: 2024-01-22 | End: 2024-01-22 | Stop reason: HOSPADM

## 2024-01-22 RX ORDER — INDOCYANINE GREEN AND WATER 25 MG
2.5 KIT INJECTION ONCE
Status: COMPLETED | OUTPATIENT
Start: 2024-01-22 | End: 2024-01-22

## 2024-01-22 RX ORDER — SODIUM CHLORIDE, SODIUM LACTATE, POTASSIUM CHLORIDE, CALCIUM CHLORIDE 600; 310; 30; 20 MG/100ML; MG/100ML; MG/100ML; MG/100ML
1000 INJECTION, SOLUTION INTRAVENOUS CONTINUOUS
Status: DISPENSED | OUTPATIENT
Start: 2024-01-22 | End: 2024-01-24

## 2024-01-22 RX ORDER — ONDANSETRON 2 MG/ML
4 INJECTION INTRAMUSCULAR; INTRAVENOUS ONCE AS NEEDED
Status: DISCONTINUED | OUTPATIENT
Start: 2024-01-22 | End: 2024-01-22 | Stop reason: HOSPADM

## 2024-01-22 RX ORDER — ACETAMINOPHEN 325 MG/1
650 TABLET ORAL ONCE AS NEEDED
Status: DISCONTINUED | OUTPATIENT
Start: 2024-01-22 | End: 2024-01-22 | Stop reason: HOSPADM

## 2024-01-22 RX ORDER — PROPOFOL 10 MG/ML
INJECTION, EMULSION INTRAVENOUS AS NEEDED
Status: DISCONTINUED | OUTPATIENT
Start: 2024-01-22 | End: 2024-01-22 | Stop reason: SURG

## 2024-01-22 RX ORDER — DEXAMETHASONE SODIUM PHOSPHATE 4 MG/ML
INJECTION, SOLUTION INTRA-ARTICULAR; INTRALESIONAL; INTRAMUSCULAR; INTRAVENOUS; SOFT TISSUE AS NEEDED
Status: DISCONTINUED | OUTPATIENT
Start: 2024-01-22 | End: 2024-01-22 | Stop reason: SURG

## 2024-01-22 RX ORDER — PROCHLORPERAZINE EDISYLATE 5 MG/ML
10 INJECTION INTRAMUSCULAR; INTRAVENOUS ONCE AS NEEDED
Status: DISCONTINUED | OUTPATIENT
Start: 2024-01-22 | End: 2024-01-22 | Stop reason: HOSPADM

## 2024-01-22 RX ORDER — FENTANYL CITRATE 50 UG/ML
INJECTION, SOLUTION INTRAMUSCULAR; INTRAVENOUS AS NEEDED
Status: DISCONTINUED | OUTPATIENT
Start: 2024-01-22 | End: 2024-01-22 | Stop reason: SURG

## 2024-01-22 RX ORDER — FENTANYL CITRATE 50 UG/ML
50 INJECTION, SOLUTION INTRAMUSCULAR; INTRAVENOUS
Status: DISCONTINUED | OUTPATIENT
Start: 2024-01-22 | End: 2024-01-22 | Stop reason: HOSPADM

## 2024-01-22 RX ORDER — HYDRALAZINE HYDROCHLORIDE 20 MG/ML
INJECTION INTRAMUSCULAR; INTRAVENOUS AS NEEDED
Status: DISCONTINUED | OUTPATIENT
Start: 2024-01-22 | End: 2024-01-22 | Stop reason: SURG

## 2024-01-22 RX ORDER — FENTANYL CITRATE 50 UG/ML
25 INJECTION, SOLUTION INTRAMUSCULAR; INTRAVENOUS
Status: DISCONTINUED | OUTPATIENT
Start: 2024-01-22 | End: 2024-01-22 | Stop reason: HOSPADM

## 2024-01-22 RX ORDER — SODIUM CHLORIDE, SODIUM LACTATE, POTASSIUM CHLORIDE, CALCIUM CHLORIDE 600; 310; 30; 20 MG/100ML; MG/100ML; MG/100ML; MG/100ML
9 INJECTION, SOLUTION INTRAVENOUS CONTINUOUS PRN
Status: DISCONTINUED | OUTPATIENT
Start: 2024-01-22 | End: 2024-01-22 | Stop reason: HOSPADM

## 2024-01-22 RX ADMIN — FENTANYL CITRATE 50 MCG: 50 INJECTION, SOLUTION INTRAMUSCULAR; INTRAVENOUS at 14:43

## 2024-01-22 RX ADMIN — FENTANYL CITRATE 100 MCG: 50 INJECTION, SOLUTION INTRAMUSCULAR; INTRAVENOUS at 14:21

## 2024-01-22 RX ADMIN — DEXMEDETOMIDINE HYDROCHLORIDE 4 MCG: 100 INJECTION, SOLUTION INTRAVENOUS at 17:45

## 2024-01-22 RX ADMIN — PROPOFOL 40 MG: 10 INJECTION, EMULSION INTRAVENOUS at 15:53

## 2024-01-22 RX ADMIN — SERTRALINE 200 MG: 100 TABLET, FILM COATED ORAL at 21:04

## 2024-01-22 RX ADMIN — HYDROCODONE BITARTRATE AND ACETAMINOPHEN 1 TABLET: 5; 325 TABLET ORAL at 22:47

## 2024-01-22 RX ADMIN — LIDOCAINE HYDROCHLORIDE 60 MG: 20 INJECTION, SOLUTION EPIDURAL; INFILTRATION; INTRACAUDAL; PERINEURAL at 14:21

## 2024-01-22 RX ADMIN — ROCURONIUM BROMIDE 20 MG: 10 INJECTION, SOLUTION INTRAVENOUS at 15:53

## 2024-01-22 RX ADMIN — ROCURONIUM BROMIDE 10 MG: 10 INJECTION, SOLUTION INTRAVENOUS at 16:27

## 2024-01-22 RX ADMIN — Medication 10 MG: at 15:58

## 2024-01-22 RX ADMIN — Medication 25 MG: at 17:53

## 2024-01-22 RX ADMIN — PIPERACILLIN AND TAZOBACTAM 3.38 G: 3; .375 INJECTION, POWDER, FOR SOLUTION INTRAVENOUS at 22:27

## 2024-01-22 RX ADMIN — ACETAMINOPHEN 650 MG: 325 TABLET, FILM COATED ORAL at 21:06

## 2024-01-22 RX ADMIN — INDOCYANINE GREEN AND WATER 2.5 MG: KIT at 13:26

## 2024-01-22 RX ADMIN — SUGAMMADEX 200 MG: 100 INJECTION, SOLUTION INTRAVENOUS at 17:49

## 2024-01-22 RX ADMIN — HYDRALAZINE HYDROCHLORIDE 5 MG: 20 INJECTION INTRAMUSCULAR; INTRAVENOUS at 15:06

## 2024-01-22 RX ADMIN — PIPERACILLIN AND TAZOBACTAM 3.38 G: 3; .375 INJECTION, POWDER, LYOPHILIZED, FOR SOLUTION INTRAVENOUS at 13:47

## 2024-01-22 RX ADMIN — HYDRALAZINE HYDROCHLORIDE 5 MG: 20 INJECTION INTRAMUSCULAR; INTRAVENOUS at 14:49

## 2024-01-22 RX ADMIN — MIDAZOLAM 2 MG: 1 INJECTION INTRAMUSCULAR; INTRAVENOUS at 14:15

## 2024-01-22 RX ADMIN — DEXMEDETOMIDINE HYDROCHLORIDE 6 MCG: 100 INJECTION, SOLUTION INTRAVENOUS at 17:30

## 2024-01-22 RX ADMIN — HYDROMORPHONE HYDROCHLORIDE 0.5 MG: 1 INJECTION, SOLUTION INTRAMUSCULAR; INTRAVENOUS; SUBCUTANEOUS at 17:47

## 2024-01-22 RX ADMIN — ONDANSETRON 4 MG: 2 INJECTION INTRAMUSCULAR; INTRAVENOUS at 16:19

## 2024-01-22 RX ADMIN — PROPOFOL 200 MG: 10 INJECTION, EMULSION INTRAVENOUS at 14:21

## 2024-01-22 RX ADMIN — PIPERACILLIN AND TAZOBACTAM 3.38 G: 3; .375 INJECTION, POWDER, LYOPHILIZED, FOR SOLUTION INTRAVENOUS at 04:46

## 2024-01-22 RX ADMIN — ROCURONIUM BROMIDE 50 MG: 10 INJECTION, SOLUTION INTRAVENOUS at 14:21

## 2024-01-22 RX ADMIN — FENTANYL CITRATE 50 MCG: 50 INJECTION, SOLUTION INTRAMUSCULAR; INTRAVENOUS at 16:27

## 2024-01-22 RX ADMIN — ATORVASTATIN CALCIUM 10 MG: 10 TABLET, FILM COATED ORAL at 21:05

## 2024-01-22 RX ADMIN — SODIUM CHLORIDE, POTASSIUM CHLORIDE, SODIUM LACTATE AND CALCIUM CHLORIDE 1000 ML: 600; 310; 30; 20 INJECTION, SOLUTION INTRAVENOUS at 13:47

## 2024-01-22 RX ADMIN — DEXAMETHASONE SODIUM PHOSPHATE 4 MG: 4 INJECTION, SOLUTION INTRAMUSCULAR; INTRAVENOUS at 14:21

## 2024-01-22 RX ADMIN — ENOXAPARIN SODIUM 70 MG: 100 INJECTION SUBCUTANEOUS at 21:03

## 2024-01-22 RX ADMIN — FENTANYL CITRATE 50 MCG: 50 INJECTION, SOLUTION INTRAMUSCULAR; INTRAVENOUS at 15:05

## 2024-01-22 RX ADMIN — PROPOFOL 70 MG: 10 INJECTION, EMULSION INTRAVENOUS at 15:16

## 2024-01-22 RX ADMIN — DEXMEDETOMIDINE HYDROCHLORIDE 4 MCG: 100 INJECTION, SOLUTION INTRAVENOUS at 17:53

## 2024-01-22 RX ADMIN — HYDROMORPHONE HYDROCHLORIDE 0.5 MG: 1 INJECTION, SOLUTION INTRAMUSCULAR; INTRAVENOUS; SUBCUTANEOUS at 15:16

## 2024-01-22 RX ADMIN — Medication 10 MG: at 15:21

## 2024-01-22 RX ADMIN — ROCURONIUM BROMIDE 10 MG: 10 INJECTION, SOLUTION INTRAVENOUS at 14:58

## 2024-01-22 NOTE — PROGRESS NOTES
[facility] MEDICINE SERVICE  DAILY PROGRESS NOTE    NAME: James Rodriguez  : 1985  MRN: [id number]      LOS: 3 days     PROVIDER OF SERVICE: Abhijeet Morris MD    Chief Complaint: Vomiting  James Rodriguez is a 38 y.o. male with PMH of Marfan's syndrome, mechanical aortic valve replacement , chronic anticoagulation, hypertension, hyperlipidemia who presented to [facility] on 2024 with complaints of abdominal pain.   Subjective:     Interval History:  History taken from: patient  Patient Complaints: No new complaint awaiting  Patient Denies: Nausea or vomiting    Review of Systems:   Review of Systems  14 point review of system unremarkable except mentioned above  Objective:     Vital Signs  Temp:  [97 °F (36.1 °C)-98.7 °F (37.1 °C)] 98 °F (36.7 °C)  Heart Rate:  [47-79] 52  Resp:  [12-22] 12  BP: (108-133)/(61-81) 131/64   Body mass index is 20.02 kg/m².    Physical Exam  Physical Exam  HENT:      Head: Atraumatic.      Mouth/Throat:      Mouth: Mucous membranes are moist.   Eyes:      Pupils: Pupils are equal, round, and reactive to light.   Cardiovascular:      Rate and Rhythm: Normal rate.      Heart sounds: Murmur heard.   Pulmonary:      Effort: Pulmonary effort is normal.      Breath sounds: Normal breath sounds.   Abdominal:      Palpations: Abdomen is soft.   Musculoskeletal:         General: Normal range of motion.      Cervical back: Neck supple.   Skin:     General: Skin is warm.   Neurological:      General: No focal deficit present.      Mental Status: He is alert and oriented to person, place, and time.   Psychiatric:         Mood and Affect: Mood normal.         Behavior: Behavior normal.         Scheduled Meds   [MAR Hold] atorvastatin, 10 mg, Oral, Nightly  [Held by provider] enoxaparin, 1 mg/kg, Subcutaneous, Q12H  losartan, 100 mg, Oral, Daily  [MAR Hold] pantoprazole, 40 mg, Intravenous, BID AC  piperacillin-tazobactam, 3.375 g, Intravenous, Q8H  [MAR Hold]  polyethylene glycol, 17 g, Oral, BID  [MAR Hold] senna-docusate sodium, 2 tablet, Oral, BID  [MAR Hold] sertraline, 200 mg, Oral, Daily  [MAR Hold] sodium chloride, 10 mL, Intravenous, Q12H  sodium chloride, 10 mL, Intravenous, Q12H  sodium chloride, 10 mL, Intravenous, Q12H       PRN Meds     [MAR Hold] acetaminophen **OR** [MAR Hold] acetaminophen **OR** [MAR Hold] acetaminophen    [MAR Hold] aluminum-magnesium hydroxide-simethicone    [MAR Hold] senna-docusate sodium **AND** [MAR Hold] polyethylene glycol **AND** [MAR Hold] bisacodyl **AND** [MAR Hold] bisacodyl    [MAR Hold] Calcium Replacement - Follow Nurse / BPA Driven Protocol    [MAR Hold] dicyclomine    lactated ringers    lactated ringers    [MAR Hold] Magnesium Standard Dose Replacement - Follow Nurse / BPA Driven Protocol    [MAR Hold] metoclopramide    [MAR Hold] nitroglycerin    [MAR Hold] ondansetron    Pharmacy Consult - Pharmacy to dose    [MAR Hold] Phosphorus Replacement - Follow Nurse / BPA Driven Protocol    Potassium Replacement - Follow Nurse / BPA Driven Protocol    [COMPLETED] Insert Peripheral IV **AND** [MAR Hold] sodium chloride    [MAR Hold] sodium chloride    sodium chloride    sodium chloride    sodium chloride    [MAR Hold] sodium chloride   Infusions  lactated ringers, 1,000 mL, Last Rate: 1,000 mL (01/22/24 1347)  lactated ringers, 9 mL/hr  lactated ringers, 9 mL/hr  Pharmacy Consult - Pharmacy to dose,   sodium chloride, 125 mL/hr, Last Rate: 125 mL/hr (01/21/24 2057)          Diagnostic Data    Results from last 7 days   Lab Units 01/22/24  0924 01/22/24  0535   WBC 10*3/mm3  --  7.40   HEMOGLOBIN g/dL  --  14.3   HEMATOCRIT %  --  43.0   PLATELETS 10*3/mm3  --  252   GLUCOSE mg/dL 88  --    CREATININE mg/dL 0.66*  --    BUN mg/dL 12  --    SODIUM mmol/L 138  --    POTASSIUM mmol/L 3.7  --    AST (SGOT) U/L 22  --    ALT (SGPT) U/L 29  --    ALK PHOS U/L 54  --    BILIRUBIN mg/dL 0.7  --    ANION GAP mmol/L 10.0  --        No  radiology results for the last day      I reviewed the patient's new clinical results.    Assessment/Plan:     Active and Resolved Problems  #Right upper quadrant abdominal pain  #Acute cholecystitis  -Presented with abdominal pain  - Imaging  Us abd Cholelithiasis with gallbladder sludge. There is gallbladder wall thickening with mild pericholecystic fluid. These findings raise the possibility of cholecystitis.   -Surgical team consulted and plan for cholecystectomy    #Mechanical aortic valve  #On warfarin supratherapeutic INR  #marfan  syndrome  - Oncology team consult appreciated-patient on home dose of warfarin 7.5 mg daily  - Stopped and being bridged with Lovenox since 01/20/2024  - Given FFP before surgery  -Will continue Lovenox and upon discharge continue following    #Essential hypertension  - Continue home medications-losartan 100 mg daily        DVT prophylaxis:  Medical and mechanical DVT prophylaxis orders are present.     Code status is   Code Status and Medical Interventions:   Ordered at: 01/16/24 1046     Level Of Support Discussed With:    Patient     Code Status (Patient has no pulse and is not breathing):    CPR (Attempt to Resuscitate)     Medical Interventions (Patient has pulse or is breathing):    Full Support       Plan for disposition:home  in 1-2 days    Time: 32 minutes    Signature: Electronically signed by Abhijeet Morris MD, 01/22/24, 14:07 EST.  [facility] Hospitalist Team

## 2024-01-22 NOTE — PLAN OF CARE
Problem: Adult Inpatient Plan of Care  Goal: Plan of Care Review  Outcome: Ongoing, Progressing  Flowsheets (Taken 1/22/2024 0116)  Outcome Evaluation: NPO after MN  Goal: Patient-Specific Goal (Individualized)  Outcome: Ongoing, Progressing  Goal: Absence of Hospital-Acquired Illness or Injury  Outcome: Ongoing, Progressing  Intervention: Identify and Manage Fall Risk  Recent Flowsheet Documentation  Taken 1/22/2024 0000 by Lorene Vital RN  Safety Promotion/Fall Prevention:   safety round/check completed   nonskid shoes/slippers when out of bed  Taken 1/21/2024 2300 by Lorene Vital RN  Safety Promotion/Fall Prevention: safety round/check completed  Taken 1/21/2024 2200 by Lorene Vital RN  Safety Promotion/Fall Prevention:   safety round/check completed   nonskid shoes/slippers when out of bed  Taken 1/21/2024 2000 by Lorene Vital RN  Safety Promotion/Fall Prevention:   safety round/check completed   nonskid shoes/slippers when out of bed  Intervention: Prevent Skin Injury  Recent Flowsheet Documentation  Taken 1/21/2024 2000 by Lorene Vital RN  Body Position:   sitting up in bed   position changed independently  Goal: Optimal Comfort and Wellbeing  Outcome: Ongoing, Progressing  Intervention: Monitor Pain and Promote Comfort  Recent Flowsheet Documentation  Taken 1/21/2024 2017 by Lorene Vital RN  Pain Management Interventions: see MAR  Taken 1/21/2024 2000 by Lorene Vital RN  Pain Management Interventions: (WILL CK MARS) --  Goal: Readiness for Transition of Care  Outcome: Ongoing, Progressing   Goal Outcome Evaluation:              Outcome Evaluation: NPO after MN

## 2024-01-22 NOTE — PROGRESS NOTES
"      Hematology/Oncology Inpatient Progress Note    PATIENT NAME: James Rodriguez  : 1985  MRN: [id number]    CHIEF COMPLAINT: Stomach pain    HISTORY OF PRESENT ILLNESS:    Expand All Collapse All[]Expand All by Default               Hematology/Oncology progress note     Patient name: James Rodriguez  : 1985  MRN: [id number]  Referring Provider: Adri Crowley PA-C  Reason for Consultation: Elevated INR     Chief complaint: Stomach pain     History of present illness:      James Rodriguez is a 38 y.o. male with PMH of Marfan's syndrome, mechanical aortic valve replacement , chronic anticoagulation, hypertension, hyperlipidemia who presented to [facility] on 2024 with complaints of abdominal pain. Reports symptoms started 2 days ago and progressively was worsening.  He was found to have cholelithiasis with possible cholecystitis and surgery was consulted for possible cholecystectomy.  However INR was supratherapeutic and therefore surgery was postponed.     2024 CT angiogram abdominal pelvis  Impression:   1. No evidence of aortoiliac aneurysm or stenosis.   2. Shotty, somewhat numerous lymph nodes in the small bowel mesentery, which may be reactive, possibly viral mesenteric lymphadenitis.   3. Fecalization of distal small bowel, with no visible obstruction. Short segment of distal ileum with numerous diverticuli. Although this is somewhat unusual, there is no visible inflammatory change to suggest small bowel diverticulitis. Consider   follow-up scan if patient symptoms persist or worsen.   4. Questionable gallbladder \"sludge\". No visible gallbladder wall inflammation or evidence of biliary ductal dilatation.   5. No evidence of potential inflammatory focus, bowel obstruction, obstructive uropathy, or other acute disease is seen.     2024 liver ultrasound  Impression:  Cholelithiasis with gallbladder sludge. There is gallbladder wall thickening with mild " pericholecystic fluid. These findings raise the possibility of cholecystitis. Clinical and laboratory correlation are recommended.     CBC with mild leukocytosis (13.20) otherwise unremarkable.  INR at presentation was 4.51, currently 3.20.  He has been holding warfarin since admission.     01/19/24  Hematology/Oncology was consulted for further recommendations.  Patient denies any bleeding issues.  Denies nausea or vomiting.  Patient is on scheduled for cholecystectomy on Monday.     He/She  has a past medical history of Hyperlipidemia, Hypertension, and Marfan's disease.     PCP: Wally Henderson MD          Subjective     Patient denies any new issues    ROS:    Review of Systems   Constitutional:  Positive for fatigue.   Neurological:  Positive for weakness.      MEDICATIONS:    Scheduled Meds:  [MAR Hold] atorvastatin, 10 mg, Oral, Nightly  [Held by provider] enoxaparin, 1 mg/kg, Subcutaneous, Q12H  losartan, 100 mg, Oral, Daily  [MAR Hold] pantoprazole, 40 mg, Intravenous, BID AC  piperacillin-tazobactam, 3.375 g, Intravenous, Q8H  [MAR Hold] polyethylene glycol, 17 g, Oral, BID  [MAR Hold] senna-docusate sodium, 2 tablet, Oral, BID  [MAR Hold] sertraline, 200 mg, Oral, Daily  [MAR Hold] sodium chloride, 10 mL, Intravenous, Q12H  sodium chloride, 10 mL, Intravenous, Q12H  sodium chloride, 10 mL, Intravenous, Q12H       Continuous Infusions:  lactated ringers, 1,000 mL, Last Rate: 1,000 mL (01/22/24 1347)  lactated ringers, 9 mL/hr  lactated ringers, 9 mL/hr  Pharmacy Consult - Pharmacy to dose,   sodium chloride, 125 mL/hr, Last Rate: 125 mL/hr (01/21/24 2057)       PRN Meds:    [MAR Hold] acetaminophen **OR** [MAR Hold] acetaminophen **OR** [MAR Hold] acetaminophen    [MAR Hold] aluminum-magnesium hydroxide-simethicone    [MAR Hold] senna-docusate sodium **AND** [MAR Hold] polyethylene glycol **AND** [MAR Hold] bisacodyl **AND** [MAR Hold] bisacodyl    bupivacaine    [MAR Hold] Calcium Replacement -  "Follow Nurse / BPA Driven Protocol    [MAR Hold] dicyclomine    lactated ringers    lactated ringers    [MAR Hold] Magnesium Standard Dose Replacement - Follow Nurse / BPA Driven Protocol    [MAR Hold] metoclopramide    [MAR Hold] nitroglycerin    [MAR Hold] ondansetron    Pharmacy Consult - Pharmacy to dose    [MAR Hold] Phosphorus Replacement - Follow Nurse / BPA Driven Protocol    Potassium Replacement - Follow Nurse / BPA Driven Protocol    [COMPLETED] Insert Peripheral IV **AND** [MAR Hold] sodium chloride    [MAR Hold] sodium chloride    sodium chloride    sodium chloride    sodium chloride    [MAR Hold] sodium chloride     ALLERGIES:    Allergies   Allergen Reactions    Latex Other (See Comments)     Primary care provider        Objective    VITALS:   /64   Pulse 52   Temp 98 °F (36.7 °C) (Oral)   Resp 12   Ht 190.5 cm (75\")   Wt 72.7 kg (160 lb 3.2 oz)   SpO2 95%   BMI 20.02 kg/m²     PHYSICAL EXAM: (performed by MD)  Physical Exam  Vitals and nursing note reviewed.   Constitutional:       General: He is not in acute distress.     Appearance: He is not diaphoretic.   HENT:      Head: Normocephalic and atraumatic.   Eyes:      General: No scleral icterus.        Right eye: No discharge.         Left eye: No discharge.      Conjunctiva/sclera: Conjunctivae normal.   Neck:      Thyroid: No thyromegaly.   Cardiovascular:      Rate and Rhythm: Normal rate and regular rhythm.      Heart sounds: Normal heart sounds.      No friction rub. No gallop.   Pulmonary:      Effort: Pulmonary effort is normal. No respiratory distress.      Breath sounds: No stridor. No wheezing.   Abdominal:      General: Bowel sounds are normal.      Palpations: Abdomen is soft. There is no mass.      Tenderness: There is no abdominal tenderness. There is no guarding or rebound.   Musculoskeletal:         General: No tenderness. Normal range of motion.      Cervical back: Normal range of motion and neck supple. "   Lymphadenopathy:      Cervical: No cervical adenopathy.   Skin:     General: Skin is warm.      Findings: No erythema or rash.   Neurological:      Mental Status: He is alert and oriented to person, place, and time.      Motor: No abnormal muscle tone.   Psychiatric:         Behavior: Behavior normal.         I have reexamined the patient and the results are consistent with the previously documented exam. Matilda Recinos MD      RECENT LABS:  Lab Results (last 24 hours)       Procedure Component Value Units Date/Time    Protime-INR [[id number]]  (Abnormal) Collected: 01/22/24 1232    Specimen: Blood Updated: 01/22/24 1303     Protime 13.4 Seconds      INR 1.25    Comprehensive Metabolic Panel [[id number]]  (Abnormal) Collected: 01/22/24 0924    Specimen: Blood from Arm, Left Updated: 01/22/24 1013     Glucose 88 mg/dL      BUN 12 mg/dL      Creatinine 0.66 mg/dL      Sodium 138 mmol/L      Potassium 3.7 mmol/L      Comment: Slight hemolysis detected by analyzer. Result may be falsely elevated.        Chloride 102 mmol/L      CO2 26.0 mmol/L      Calcium 9.6 mg/dL      Total Protein 6.7 g/dL      Albumin 3.7 g/dL      ALT (SGPT) 29 U/L      AST (SGOT) 22 U/L      Alkaline Phosphatase 54 U/L      Total Bilirubin 0.7 mg/dL      Globulin 3.0 gm/dL      A/G Ratio 1.2 g/dL      BUN/Creatinine Ratio 18.2     Anion Gap 10.0 mmol/L      eGFR 122.4 mL/min/1.73     Narrative:      GFR Normal >60  Chronic Kidney Disease <60  Kidney Failure <15      Magnesium [[id number]]  (Normal) Collected: 01/22/24 0924    Specimen: Blood from Arm, Left Updated: 01/22/24 1013     Magnesium 2.0 mg/dL     CBC & Differential [[id number]]  (Normal) Collected: 01/22/24 0535    Specimen: Blood from Arm, Left Updated: 01/22/24 0642    Narrative:      The following orders were created for panel order CBC & Differential.  Procedure                               Abnormality         Status                     ---------                                -----------         ------                     CBC Auto Differential[[id number]]        Normal              Final result                 Please view results for these tests on the individual orders.    CBC Auto Differential [[id number]]  (Normal) Collected: 01/22/24 0535    Specimen: Blood from Arm, Left Updated: 01/22/24 0642     WBC 7.40 10*3/mm3      RBC 4.81 10*6/mm3      Hemoglobin 14.3 g/dL      Comment: Result checked          Hematocrit 43.0 %      MCV 89.3 fL      MCH 29.7 pg      MCHC 33.2 g/dL      RDW 13.7 %      RDW-SD 42.0 fl      MPV 8.8 fL      Platelets 252 10*3/mm3      Neutrophil % 60.0 %      Lymphocyte % 25.9 %      Monocyte % 10.3 %      Eosinophil % 3.0 %      Basophil % 0.8 %      Neutrophils, Absolute 4.40 10*3/mm3      Lymphocytes, Absolute 1.90 10*3/mm3      Monocytes, Absolute 0.80 10*3/mm3      Eosinophils, Absolute 0.20 10*3/mm3      Basophils, Absolute 0.10 10*3/mm3      nRBC 0.1 /100 WBC     Phosphorus [[id number]]  (Normal) Collected: 01/22/24 0526    Specimen: Blood from Arm, Left Updated: 01/22/24 0637     Phosphorus 3.3 mg/dL     Protime-INR [[id number]]  (Normal) Collected: 01/22/24 0526    Specimen: Blood from Arm, Left Updated: 01/22/24 0630     Protime 20.7 Seconds      INR 2.00    Gastrointestinal Panel, PCR - Stool, Per Rectum [[id number]]  (Normal) Collected: 01/21/24 1711    Specimen: Stool from Per Rectum Updated: 01/21/24 1836     Campylobacter Not Detected     Plesiomonas shigelloides Not Detected     Salmonella Not Detected     Vibrio Not Detected     Vibrio cholerae Not Detected     Yersinia enterocolitica Not Detected     Enteroaggregative E. coli (EAEC) Not Detected     Enteropathogenic E. coli (EPEC) Not Detected     Enterotoxigenic E. coli (ETEC) lt/st Not Detected     Shiga-like toxin-producing E. coli (STEC) stx1/stx2 Not Detected     Shigella/Enteroinvasive E. coli (EIEC) Not Detected     Cryptosporidium Not Detected     Cyclospora cayetanensis Not Detected      Entamoeba histolytica Not Detected     Giardia lamblia Not Detected     Adenovirus F40/41 Not Detected     Astrovirus Not Detected     Norovirus GI/GII Not Detected     Rotavirus A Not Detected     Sapovirus (I, II, IV or V) Not Detected    Narrative:      If Aeromonas, Staphylococcus aureus or Bacillus cereus are suspected, please order [id number]: Stool Culture, Aeromonas, S aureus, B Cereus.            PENDING RESULTS:     IMAGING REVIEWED:  No radiology results for the last day    Assessment & Plan   ASSESSMENT:  Supratherapeutic INR  Marfan's syndrome  Mechanical valve (aortic)  - Patient on long-term anticoagulation with Warfarin for above. Patient reports dose is 7.5mg daily.    - INR on admission 4.51 -> 5.45 -> 3.20.  - Warfarin has been held since 1/16. 1/19/2024-INR 3.20--INR today is 2.0.  Patient to receive FFP on-call to the OR  - Cardiology consulted and recommends Lovenox bridge closer when INR closer to 2.5  - Lovenox bridge started 1/20/2024     Abdominal pain  Cholecystitis  - CT and US imaging, along with leukocytosis concerning for cholecystitis.   - General surgery has been consulted and is holding off on surgery until INR is less than or equal to 1.5  For surgery today    PLAN:  Continue to hold warfarin and trend INR per cardiology recommendations  Hold Lovenox for surgery  Daily labs  Daily CBCs and INR  Will continue to follow  Cholecystectomy planned for 1/22/2024            Electronically signed by Matilda Recinos MD, 01/22/24, 5:17 PM EST.

## 2024-01-22 NOTE — CASE MANAGEMENT/SOCIAL WORK
Continued Stay Note  [facility]     Patient Name: James Rodriguez  MRN: [id number]  Today's Date: 1/22/2024    Admit Date: 1/16/2024    Plan: Return home with S.O   Discharge Plan       Row Name 01/22/24 1246       Plan    Plan Return home with S.O    Patient/Family in Agreement with Plan yes    Plan Comments Barriers: CHOLECYSTECTOMY LAPAROSCOPIC WITH DAVINCI ROBOT today.  At discharge will return home with S.O                      Phone communication or documentation only - no physical contact with patient or family.   Ana COOPER,RN Case Manager  [facility]  Phone: Desk- [phone] cell- [phone]

## 2024-01-22 NOTE — BRIEF OP NOTE
CHOLECYSTECTOMY LAPAROSCOPIC WITH DAVINCI ROBOT  Progress Note    James Rodriguez  1/22/2024    Pre-op Diagnosis:   Acute calculus cholecystitis       Post-Op Diagnosis Codes:  Same    Procedure(s):  Robotic assisted laparoscopic subtotal cholecystectomy    Surgeon(s):  Chandrika Bran MD    Anesthesia: General    Staff:   Circulator: Patti Javier RN CSA; Tashia Murcia RN; Lazarus Zarco RN; Mars Carson RN  Scrub Person: Jackie Huynh; Ana Dolan; Joe Gillespie  Assistant: Demi Son APRN  Assistant: Demi Son APRN      Estimated Blood Loss: 500 mL    Urine Voided: * No values recorded between 1/22/2024  2:15 PM and 1/22/2024  6:00 PM *    Specimens:                Specimens       ID Source Type Tests Collected By Collected At Frozen?    A Gallbladder Tissue TISSUE PATHOLOGY EXAM   Chandrika Bran MD 1/22/24 1723           Drains:  10 mm Chet-Sims drain left in gallbladder fossa/right upper quadrant    Findings: Acute on chronic cholecystitis.  Thickened gallbladder wall up to approximately 15 mm.  Cholelithiasis.  Clearly visualized gallbladder neck from inside of the gallbladder.  Unable to dissect out the cystic duct safely.    Complications: None immediate    Assistant: Demi Son APRN  was responsible for performing the following activities: Closing, Placing Dressing, and Held/Positioned Camera and their skilled assistance was necessary for the success of this case.    Chandrika Bran MD     Date: 1/22/2024  Time: 18:29 EST

## 2024-01-22 NOTE — PLAN OF CARE
Goal Outcome Evaluation:  Plan of Care Reviewed With: patient, spouse        Progress: no change  Outcome Evaluation: Patient continues to recieve IV antibiotics and PO pain meds. Patient has a lap appy scheduled tomorrow at 1300, consent signed on chart.

## 2024-01-22 NOTE — ANESTHESIA PREPROCEDURE EVALUATION
Anesthesia Evaluation     Patient summary reviewed and Nursing notes reviewed   no history of anesthetic complications:   NPO Solid Status: > 8 hours  NPO Liquid Status: > 2 hours           Airway   Dental      Pulmonary    (+) a smoker Current,  Cardiovascular     ECG reviewed  PT is on anticoagulation therapy    (+) hypertension, valvular problems/murmurs MR, hyperlipidemia      Neuro/Psych  GI/Hepatic/Renal/Endo      Musculoskeletal     Abdominal    Substance History   (+) drug use     OB/GYN          Other        ROS/Med Hx Other: Additional History:  Marfan's, abd pain, N/V, MJ abuse    Echo:  Echocardiogram Findings    Left Ventricle Left ventricular ejection fraction appears to be 61 - 65%.     Normal left ventricular cavity size and wall thickness noted. All left ventricular wall segments contract normally. Left ventricular diastolic function is consistent with (grade I) impaired relaxation.  Right Ventricle Normal right ventricular cavity size, wall thickness, systolic function and septal motion noted.  Left Atrium Normal left atrial size and volume noted.  Right Atrium Normal right atrial cavity size noted. The inferior vena cava is normally sized. Normal IVC inspiratory collapse of greater than 50% noted.  Aortic Valve No aortic valve regurgitation is present. No hemodynamically significant aortic valve stenosis is present. There is a mechanical aortic valve prosthesis present.  Mitral Valve The mitral valve is structurally normal with no significant stenosis present. Mild mitral valve regurgitation is present.  Tricuspid Valve The tricuspid valve is structurally normal with no stenosis present. Trace tricuspid valve regurgitation is present. Estimated right ventricular systolic pressure from tricuspid regurgitation is normal (<35 mmHg).  Pulmonic Valve The pulmonic valve is not well visualized. No pulmonic valve regurgitation or significant stenosis is present.  Greater Vessels The aortic root not well  visualized.  Pericardium The pericardium is normal. There is no evidence of pericardial effusion. .        PSH:  CARDIAC VALVE REPLACEMENT - AVR              Anesthesia Plan    ASA 3     general     (Patient identified; pre-operative vital signs, all relevant labs/studies, complete medical/surgical/anesthetic history, full medication list, full allergy list, and NPO status obtained/reviewed; physical assessment performed; anesthetic options, side effects, potential complications, risks, and benefits discussed; questions answered; written anesthesia consent obtained; patient cleared for procedure; anesthesia machine and equipment checked and functioning)  intravenous induction     Anesthetic plan, risks, benefits, and alternatives have been provided, discussed and informed consent has been obtained with: patient.    Plan discussed with CRNA and CAA.    CODE STATUS:    Level Of Support Discussed With: Patient  Code Status (Patient has no pulse and is not breathing): CPR (Attempt to Resuscitate)  Medical Interventions (Patient has pulse or is breathing): Full Support

## 2024-01-22 NOTE — ANESTHESIA POSTPROCEDURE EVALUATION
Patient: James Rodriguez    Procedure Summary       Date: 01/22/24 Room / Location: [facility] OR 04 / [facility] MAIN OR    Anesthesia Start: 1415 Anesthesia Stop: 1758    Procedure: CHOLECYSTECTOMY LAPAROSCOPIC WITH DAVINCI ROBOT (Abdomen) Diagnosis:     Surgeons: Chandrika Bran MD Provider: Siddhartha Paredes CRNA    Anesthesia Type: general ASA Status: 3            Anesthesia Type: general    Vitals  Vitals Value Taken Time   /50 01/22/24 1844   Temp 98.1 °F (36.7 °C) 01/22/24 1802   Pulse 57 01/22/24 1845   Resp 16 01/22/24 1832   SpO2 95 % 01/22/24 1845   Vitals shown include unfiled device data.        Post Anesthesia Care and Evaluation    Patient location during evaluation: PACU  Patient participation: complete - patient participated  Level of consciousness: awake  Pain score: 0  Pain management: adequate  Anesthetic complications: No anesthetic complications  PONV Status: none  Cardiovascular status: acceptable  Respiratory status: acceptable  Hydration status: acceptable

## 2024-01-22 NOTE — ANESTHESIA PROCEDURE NOTES
Airway  Urgency: elective    Date/Time: 1/22/2024 2:23 PM  Airway not difficult    General Information and Staff    Patient location during procedure: OR  CRNA/CAA: Siddhartha Paredes CRNA    Indications and Patient Condition  Indications for airway management: airway protection    Preoxygenated: yes  Mask difficulty assessment: 1 - vent by mask    Final Airway Details  Final airway type: endotracheal airway      Successful airway: ETT  Cuffed: yes   Successful intubation technique: video laryngoscopy (used brooks due to poor dentition)  Facilitating devices/methods: intubating stylet  Endotracheal tube insertion site: oral  Blade: Brooks  Blade size: 3  ETT size (mm): 7.5  Cormack-Lehane Classification: grade I - full view of glottis  Placement verified by: chest auscultation and capnometry   Measured from: teeth  ETT/EBT  to teeth (cm): 21  Number of attempts at approach: 1  Assessment: lips, teeth, and gum same as pre-op and atraumatic intubation

## 2024-01-23 LAB
ALBUMIN SERPL-MCNC: 3.5 G/DL (ref 3.5–5.2)
ALBUMIN/GLOB SERPL: 1.3 G/DL
ALP SERPL-CCNC: 55 U/L (ref 39–117)
ALT SERPL W P-5'-P-CCNC: 36 U/L (ref 1–41)
ANION GAP SERPL CALCULATED.3IONS-SCNC: 11 MMOL/L (ref 5–15)
AST SERPL-CCNC: 34 U/L (ref 1–40)
BASOPHILS # BLD AUTO: 0.1 10*3/MM3 (ref 0–0.2)
BASOPHILS NFR BLD AUTO: 0.7 % (ref 0–1.5)
BH BB BLOOD EXPIRATION DATE: NORMAL
BH BB BLOOD TYPE BARCODE: 6200
BH BB DISPENSE STATUS: NORMAL
BH BB PRODUCT CODE: NORMAL
BH BB UNIT NUMBER: NORMAL
BILIRUB SERPL-MCNC: 0.5 MG/DL (ref 0–1.2)
BUN SERPL-MCNC: 10 MG/DL (ref 6–20)
BUN/CREAT SERPL: 16.4 (ref 7–25)
CALCIUM SPEC-SCNC: 9.3 MG/DL (ref 8.6–10.5)
CHLORIDE SERPL-SCNC: 100 MMOL/L (ref 98–107)
CO2 SERPL-SCNC: 26 MMOL/L (ref 22–29)
CREAT SERPL-MCNC: 0.61 MG/DL (ref 0.76–1.27)
DEPRECATED RDW RBC AUTO: 41.6 FL (ref 37–54)
EGFRCR SERPLBLD CKD-EPI 2021: 125.3 ML/MIN/1.73
EOSINOPHIL # BLD AUTO: 0 10*3/MM3 (ref 0–0.4)
EOSINOPHIL NFR BLD AUTO: 0.4 % (ref 0.3–6.2)
ERYTHROCYTE [DISTWIDTH] IN BLOOD BY AUTOMATED COUNT: 13.5 % (ref 12.3–15.4)
GLOBULIN UR ELPH-MCNC: 2.7 GM/DL
GLUCOSE SERPL-MCNC: 78 MG/DL (ref 65–99)
HCT VFR BLD AUTO: 39.7 % (ref 37.5–51)
HGB BLD-MCNC: 13.4 G/DL (ref 13–17.7)
INR PPP: 1.26 (ref 2–3)
LYMPHOCYTES # BLD AUTO: 1.7 10*3/MM3 (ref 0.7–3.1)
LYMPHOCYTES NFR BLD AUTO: 16.8 % (ref 19.6–45.3)
MAGNESIUM SERPL-MCNC: 1.6 MG/DL (ref 1.6–2.6)
MCH RBC QN AUTO: 29.9 PG (ref 26.6–33)
MCHC RBC AUTO-ENTMCNC: 33.8 G/DL (ref 31.5–35.7)
MCV RBC AUTO: 88.4 FL (ref 79–97)
MONOCYTES # BLD AUTO: 0.9 10*3/MM3 (ref 0.1–0.9)
MONOCYTES NFR BLD AUTO: 8.8 % (ref 5–12)
NEUTROPHILS NFR BLD AUTO: 7.3 10*3/MM3 (ref 1.7–7)
NEUTROPHILS NFR BLD AUTO: 73.3 % (ref 42.7–76)
NRBC BLD AUTO-RTO: 0 /100 WBC (ref 0–0.2)
PLATELET # BLD AUTO: 266 10*3/MM3 (ref 140–450)
PMV BLD AUTO: 8.3 FL (ref 6–12)
POTASSIUM SERPL-SCNC: 3.5 MMOL/L (ref 3.5–5.2)
PROT SERPL-MCNC: 6.2 G/DL (ref 6–8.5)
PROTHROMBIN TIME: 13.5 SECONDS (ref 19.4–28.5)
RBC # BLD AUTO: 4.5 10*6/MM3 (ref 4.14–5.8)
SODIUM SERPL-SCNC: 137 MMOL/L (ref 136–145)
UNIT  ABO: NORMAL
UNIT  RH: NORMAL
WBC NRBC COR # BLD AUTO: 10 10*3/MM3 (ref 3.4–10.8)

## 2024-01-23 PROCEDURE — 25010000002 PIPERACILLIN SOD-TAZOBACTAM PER 1 G: Performed by: SURGERY

## 2024-01-23 PROCEDURE — 85025 COMPLETE CBC W/AUTO DIFF WBC: CPT | Performed by: SURGERY

## 2024-01-23 PROCEDURE — 83735 ASSAY OF MAGNESIUM: CPT | Performed by: SURGERY

## 2024-01-23 PROCEDURE — 80053 COMPREHEN METABOLIC PANEL: CPT | Performed by: SURGERY

## 2024-01-23 PROCEDURE — 25810000003 SODIUM CHLORIDE 0.9 % SOLUTION: Performed by: SURGERY

## 2024-01-23 PROCEDURE — 25010000002 ENOXAPARIN PER 10 MG: Performed by: SURGERY

## 2024-01-23 PROCEDURE — 99232 SBSQ HOSP IP/OBS MODERATE 35: CPT | Performed by: NURSE PRACTITIONER

## 2024-01-23 PROCEDURE — 99231 SBSQ HOSP IP/OBS SF/LOW 25: CPT | Performed by: INTERNAL MEDICINE

## 2024-01-23 PROCEDURE — 85610 PROTHROMBIN TIME: CPT | Performed by: SURGERY

## 2024-01-23 RX ORDER — POTASSIUM CHLORIDE 20 MEQ/1
40 TABLET, EXTENDED RELEASE ORAL EVERY 4 HOURS
Qty: 4 TABLET | Refills: 0 | Status: COMPLETED | OUTPATIENT
Start: 2024-01-23 | End: 2024-01-23

## 2024-01-23 RX ORDER — HYDROCODONE BITARTRATE AND ACETAMINOPHEN 5; 325 MG/1; MG/1
1 TABLET ORAL EVERY 4 HOURS PRN
Status: DISCONTINUED | OUTPATIENT
Start: 2024-01-23 | End: 2024-01-25 | Stop reason: HOSPADM

## 2024-01-23 RX ADMIN — POTASSIUM CHLORIDE 40 MEQ: 1500 TABLET, EXTENDED RELEASE ORAL at 10:29

## 2024-01-23 RX ADMIN — ENOXAPARIN SODIUM 70 MG: 100 INJECTION SUBCUTANEOUS at 20:13

## 2024-01-23 RX ADMIN — SERTRALINE 200 MG: 100 TABLET, FILM COATED ORAL at 20:13

## 2024-01-23 RX ADMIN — Medication 10 ML: at 20:14

## 2024-01-23 RX ADMIN — HYDROCODONE BITARTRATE AND ACETAMINOPHEN 1 TABLET: 5; 325 TABLET ORAL at 19:26

## 2024-01-23 RX ADMIN — HYDROCODONE BITARTRATE AND ACETAMINOPHEN 1 TABLET: 5; 325 TABLET ORAL at 23:54

## 2024-01-23 RX ADMIN — ENOXAPARIN SODIUM 70 MG: 100 INJECTION SUBCUTANEOUS at 10:29

## 2024-01-23 RX ADMIN — POTASSIUM CHLORIDE 40 MEQ: 1500 TABLET, EXTENDED RELEASE ORAL at 14:53

## 2024-01-23 RX ADMIN — ATORVASTATIN CALCIUM 10 MG: 10 TABLET, FILM COATED ORAL at 20:13

## 2024-01-23 RX ADMIN — Medication 10 ML: at 10:31

## 2024-01-23 RX ADMIN — HYDROCODONE BITARTRATE AND ACETAMINOPHEN 1 TABLET: 5; 325 TABLET ORAL at 05:52

## 2024-01-23 RX ADMIN — HYDROCODONE BITARTRATE AND ACETAMINOPHEN 1 TABLET: 5; 325 TABLET ORAL at 14:53

## 2024-01-23 RX ADMIN — HYDROCODONE BITARTRATE AND ACETAMINOPHEN 1 TABLET: 5; 325 TABLET ORAL at 10:29

## 2024-01-23 RX ADMIN — PIPERACILLIN AND TAZOBACTAM 3.38 G: 3; .375 INJECTION, POWDER, FOR SOLUTION INTRAVENOUS at 22:11

## 2024-01-23 RX ADMIN — PIPERACILLIN AND TAZOBACTAM 3.38 G: 3; .375 INJECTION, POWDER, FOR SOLUTION INTRAVENOUS at 05:51

## 2024-01-23 RX ADMIN — PANTOPRAZOLE SODIUM 40 MG: 40 INJECTION, POWDER, LYOPHILIZED, FOR SOLUTION INTRAVENOUS at 10:29

## 2024-01-23 RX ADMIN — PIPERACILLIN AND TAZOBACTAM 3.38 G: 3; .375 INJECTION, POWDER, FOR SOLUTION INTRAVENOUS at 14:52

## 2024-01-23 RX ADMIN — LOSARTAN POTASSIUM 100 MG: 50 TABLET, FILM COATED ORAL at 10:33

## 2024-01-23 RX ADMIN — SODIUM CHLORIDE 125 ML/HR: 9 INJECTION, SOLUTION INTRAVENOUS at 20:06

## 2024-01-23 NOTE — PROGRESS NOTES
[facility] MEDICINE SERVICE  DAILY PROGRESS NOTE    NAME: James Rodriguez  : 1985  MRN: [id number]      LOS: 4 days     PROVIDER OF SERVICE: Abhijeet Morris MD    Chief Complaint: Vomiting  James Rodriguez is a 38 y.o. male with PMH of Marfan's syndrome, mechanical aortic valve replacement , chronic anticoagulation, hypertension, hyperlipidemia who presented to [facility] on 2024 with complaints of abdominal pain.   Subjective:     Interval History:  History taken from: patient  Patient Complaints:  pain at surgical site improving with pain meds   Review of Systems:   Review of Systems  14 point review of system unremarkable except mentioned above  Objective:     Vital Signs  Temp:  [97 °F (36.1 °C)-98.1 °F (36.7 °C)] 97.5 °F (36.4 °C)  Heart Rate:  [56-69] 57  Resp:  [14-19] 15  BP: (110-130)/(47-70) 117/64  Flow (L/min):  [2-5] 2   Body mass index is 20.02 kg/m².    Physical Exam  Physical Exam  HENT:      Head: Atraumatic.      Mouth/Throat:      Mouth: Mucous membranes are moist.   Eyes:      Pupils: Pupils are equal, round, and reactive to light.   Cardiovascular:      Rate and Rhythm: Normal rate.      Heart sounds: Murmur heard.   Pulmonary:      Effort: Pulmonary effort is normal.      Breath sounds: Normal breath sounds.   Abdominal:      Palpations: Abdomen is soft.      Comments: TAL drain in place , clean surgical dressing    Musculoskeletal:         General: Normal range of motion.      Cervical back: Neck supple.   Skin:     General: Skin is warm.   Neurological:      General: No focal deficit present.      Mental Status: He is alert and oriented to person, place, and time.   Psychiatric:         Mood and Affect: Mood normal.         Behavior: Behavior normal.         Scheduled Meds   atorvastatin, 10 mg, Oral, Nightly  enoxaparin, 1 mg/kg, Subcutaneous, Q12H  losartan, 100 mg, Oral, Daily  pantoprazole, 40 mg, Intravenous, BID AC  piperacillin-tazobactam, 3.375 g,  Intravenous, Q8H  polyethylene glycol, 17 g, Oral, BID  senna-docusate sodium, 2 tablet, Oral, BID  sertraline, 200 mg, Oral, Daily  sodium chloride, 10 mL, Intravenous, Q12H       PRN Meds     acetaminophen **OR** acetaminophen **OR** acetaminophen    aluminum-magnesium hydroxide-simethicone    senna-docusate sodium **AND** polyethylene glycol **AND** bisacodyl **AND** bisacodyl    Calcium Replacement - Follow Nurse / BPA Driven Protocol    dicyclomine    HYDROcodone-acetaminophen    Magnesium Standard Dose Replacement - Follow Nurse / BPA Driven Protocol    metoclopramide    nitroglycerin    ondansetron    Pharmacy Consult - Pharmacy to dose    Phosphorus Replacement - Follow Nurse / BPA Driven Protocol    Potassium Replacement - Follow Nurse / BPA Driven Protocol    [COMPLETED] Insert Peripheral IV **AND** sodium chloride    sodium chloride    sodium chloride   Infusions  lactated ringers, 1,000 mL, Last Rate: Stopped (01/22/24 1750)  Pharmacy Consult - Pharmacy to dose,   sodium chloride, 125 mL/hr, Last Rate: 125 mL/hr (01/22/24 2106)          Diagnostic Data    Results from last 7 days   Lab Units 01/23/24  0430   WBC 10*3/mm3 10.00   HEMOGLOBIN g/dL 13.4   HEMATOCRIT % 39.7   PLATELETS 10*3/mm3 266   GLUCOSE mg/dL 78   CREATININE mg/dL 0.61*   BUN mg/dL 10   SODIUM mmol/L 137   POTASSIUM mmol/L 3.5   AST (SGOT) U/L 34   ALT (SGPT) U/L 36   ALK PHOS U/L 55   BILIRUBIN mg/dL 0.5   ANION GAP mmol/L 11.0       No radiology results for the last day      I reviewed the patient's new clinical results.    Assessment/Plan:     Active and Resolved Problems  #Right upper quadrant abdominal pain  #s/p cholecystectomy-robotic assisted laparoscopic  #Acute cholecystitis  -Presented with abdominal pain  - Imaging  Us abd Cholelithiasis with gallbladder sludge. There is gallbladder wall thickening with mild pericholecystic fluid. These findings raise the possibility of cholecystitis.   -Surgical team input appreciated status  post robotic assisted cholecystectomy placement  of drain, introp Gallbladder was super inflamed and was partially necrotic as wel  -Continue IV antibiotics    #Mechanical aortic valve  #On warfarin supratherapeutic INR  #marfan  syndrome  - Oncology team consult appreciated-patient on home dose of warfarin 7.5 mg daily  - Stopped and being bridged with Lovenox since 01/20/2024  - Given FFP before surgery  -Will continue Lovenox and upon discharge continue following    #Essential hypertension  - Continue home medications-losartan 100 mg daily        DVT prophylaxis:  Medical and mechanical DVT prophylaxis orders are present.     Code status is   Code Status and Medical Interventions:   Ordered at: 01/16/24 1046     Level Of Support Discussed With:    Patient     Code Status (Patient has no pulse and is not breathing):    CPR (Attempt to Resuscitate)     Medical Interventions (Patient has pulse or is breathing):    Full Support       Plan for disposition:home  in 1-2 days    Time: 32 minutes    Signature: Electronically signed by Abhijeet Morris MD, 01/23/24, 15:21 EST.  [facility] Hospitalist Team

## 2024-01-23 NOTE — PROGRESS NOTES
Reason for follow-up: Aortic valve replacement, anticoagulation management     Patient Care Team:  Wally Henderson MD as PCP - General (Internal Medicine)    Subjective .   James Rodriguez is doing well today     Review of Systems   Constitutional: Negative for fever and malaise/fatigue.   Cardiovascular:  Negative for chest pain and palpitations.   Respiratory:  Negative for shortness of breath.    Neurological:  Negative for dizziness and light-headedness.   All other systems reviewed and are negative.      Latex    Scheduled Meds:atorvastatin, 10 mg, Oral, Nightly  enoxaparin, 1 mg/kg, Subcutaneous, Q12H  losartan, 100 mg, Oral, Daily  pantoprazole, 40 mg, Intravenous, BID AC  piperacillin-tazobactam, 3.375 g, Intravenous, Q8H  polyethylene glycol, 17 g, Oral, BID  potassium chloride ER, 40 mEq, Oral, Q4H  senna-docusate sodium, 2 tablet, Oral, BID  sertraline, 200 mg, Oral, Daily  sodium chloride, 10 mL, Intravenous, Q12H      Continuous Infusions:lactated ringers, 1,000 mL, Last Rate: Stopped (01/22/24 1750)  Pharmacy Consult - Pharmacy to dose,   sodium chloride, 125 mL/hr, Last Rate: 125 mL/hr (01/22/24 2106)      PRN Meds:.  acetaminophen **OR** acetaminophen **OR** acetaminophen    aluminum-magnesium hydroxide-simethicone    senna-docusate sodium **AND** polyethylene glycol **AND** bisacodyl **AND** bisacodyl    Calcium Replacement - Follow Nurse / BPA Driven Protocol    dicyclomine    HYDROcodone-acetaminophen    Magnesium Standard Dose Replacement - Follow Nurse / BPA Driven Protocol    metoclopramide    nitroglycerin    ondansetron    Pharmacy Consult - Pharmacy to dose    Phosphorus Replacement - Follow Nurse / BPA Driven Protocol    Potassium Replacement - Follow Nurse / BPA Driven Protocol    [COMPLETED] Insert Peripheral IV **AND** sodium chloride    sodium chloride    sodium chloride      VITAL SIGNS  Vitals:    01/23/24 0600 01/23/24 0630 01/23/24 1031 01/23/24 1112   BP:  110/67 119/69  "129/58   BP Location:  Left arm  Left arm   Patient Position:  Lying  Lying   Pulse: 59 59  58   Resp:  19  16   Temp:  98.1 °F (36.7 °C)  97.8 °F (36.6 °C)   TempSrc:  Oral  Oral   SpO2: 94% 94%  91%   Weight:       Height:           Flowsheet Rows      Flowsheet Row First Filed Value   Admission Height 190.5 cm (75\") Documented at 01/16/2024 0614   Admission Weight 77.1 kg (169 lb 15.6 oz) Documented at 01/16/2024 0614               Physical Exam  VITALS REVIEWED    General:      well developed, in no acute distress.    Head:      normocephalic and atraumatic.    Eyes:      PERRL/EOM intact, conjunctiva and sclera clear with out nystagmus.    Neck:      no masses, thyromegaly,  trachea central with normal respiratory effort and PMI displaced laterally  Lungs:      Clear  Heart:       Regular rate and rhythm  Msk:      no deformity or scoliosis noted of thoracic or lumbar spine.    Pulses:      pulses normal in all 4 extremities.    Extremities:       No lower extremity edema  Neurologic:      no focal deficits.   alert oriented x3  Skin:      intact without lesions or rashes.    Psych:      alert and cooperative; normal mood and affect; normal attention span and concentration.          LAB RESULTS (LAST 7 DAYS)    CBC  Results from last 7 days   Lab Units 01/23/24  0430 01/22/24  0535 01/21/24  0408 01/20/24  0451 01/19/24  0442 01/18/24  0345 01/17/24  0358   WBC 10*3/mm3 10.00 7.40 11.00* 9.50 13.20* 19.10* 17.20*   RBC 10*6/mm3 4.50 4.81 5.64 5.18 5.41 5.63 5.60   HEMOGLOBIN g/dL 13.4 14.3 17.0 15.6 16.2 17.0 16.8   HEMATOCRIT % 39.7 43.0 49.9 45.4 48.1 50.2 49.8   MCV fL 88.4 89.3 88.4 87.7 88.9 89.2 89.0   PLATELETS 10*3/mm3 266 252 270 208 201 218 222       BMP  Results from last 7 days   Lab Units 01/23/24  0430 01/22/24  0924 01/22/24  0526 01/21/24  0408 01/20/24  1621 01/20/24  0451 01/19/24  0442 01/18/24  0345 01/17/24  0358   SODIUM mmol/L 137 138  --  136  --  136 135* 135* 139   POTASSIUM mmol/L 3.5 " 3.7  --  3.8 4.1 3.5 3.4* 3.6 3.7   CHLORIDE mmol/L 100 102  --  99  --  100 99 100 103   CO2 mmol/L 26.0 26.0  --  23.0  --  27.0 25.0 26.0 24.0   BUN mg/dL 10 12  --  9  --  13 14 8 8   CREATININE mg/dL 0.61* 0.66*  --  0.59*  --  0.59* 0.61* 0.57* 0.53*   GLUCOSE mg/dL 78 88  --  134*  --  90 95 109* 113*   MAGNESIUM mg/dL 1.6 2.0  --  1.8  --  1.9 1.8  --   --    PHOSPHORUS mg/dL  --   --  3.3 2.8  --  2.8 2.2*  --   --        CMP   Results from last 7 days   Lab Units 01/23/24  0430 01/22/24  0924 01/21/24  0408 01/20/24  1621 01/20/24  0451 01/19/24  0442 01/18/24  0345 01/17/24  0358   SODIUM mmol/L 137 138 136  --  136 135* 135* 139   POTASSIUM mmol/L 3.5 3.7 3.8 4.1 3.5 3.4* 3.6 3.7   CHLORIDE mmol/L 100 102 99  --  100 99 100 103   CO2 mmol/L 26.0 26.0 23.0  --  27.0 25.0 26.0 24.0   BUN mg/dL 10 12 9  --  13 14 8 8   CREATININE mg/dL 0.61* 0.66* 0.59*  --  0.59* 0.61* 0.57* 0.53*   GLUCOSE mg/dL 78 88 134*  --  90 95 109* 113*   ALBUMIN g/dL 3.5 3.7 4.0  --  3.7 3.9 4.3  --    BILIRUBIN mg/dL 0.5 0.7 0.9  --  0.8 1.0 1.1  --    ALK PHOS U/L 55 54 69  --  64 67 67  --    AST (SGOT) U/L 34 22 27  --  25 23 33  --    ALT (SGPT) U/L 36 29 34  --  29 30 36  --    LIPASE U/L  --   --   --   --   --   --  31  --          BNP        TROPONIN        CoAg  Results from last 7 days   Lab Units 01/23/24  0430 01/22/24  1232 01/22/24  0526 01/21/24  0408 01/20/24  0451 01/19/24  0442 01/18/24  0345   INR  1.26* 1.25* 2.00 1.60* 1.98* 3.20* 5.45*       Creatinine Clearance  Estimated Creatinine Clearance: 167.2 mL/min (A) (by C-G formula based on SCr of 0.61 mg/dL (L)).    ABG          EKG    I personally reviewed the patient's EKG/Telemetry data: Sinus rhythm      Assessment & Plan       Vomiting    Marfan's syndrome    Essential hypertension      James Rodriguez is a 38-year-old male patient who has Marfan syndrome, has a mechanical valve replacement in 1998, has been on chronic anticoagulation with warfarin,  presented to the hospital with abdominal pain and possible cholecystitis.  Anticipated surgery early next week.  Warfarin is discontinued, he is being bridged with full dose Lovenox twice daily.  Lovenox can be stopped the morning of the surgery and resumed along with warfarin as soon as possible.    1/23/2024  Patient underwent LAP cholecystectomy yesterday. Remains on lovenox. Will restart coumadin for mechanical aortic valve when ok with surgery. Continue current medical therapy.     I discussed the patients findings and my recommendations with patient and agrees with outlined plan.    STEVE Garcia  01/23/24  14:02 EST  Electronically signed by STEVE Garcia, 01/23/24, 2:21 PM EST.

## 2024-01-23 NOTE — PROGRESS NOTES
"      Hematology/Oncology Inpatient Progress Note    Expand All Collapse All[]Expand All by Default               Hematology/Oncology progress note     Patient name: James Rodriguez  : 1985  MRN: [id number]  Referring Provider: Adri Crowley PA-C  Reason for Consultation: Elevated INR     Chief complaint: Stomach pain     History of present illness:      James Rodriguez is a 38 y.o. male with PMH of Marfan's syndrome, mechanical aortic valve replacement , chronic anticoagulation, hypertension, hyperlipidemia who presented to [facility] on 2024 with complaints of abdominal pain. Reports symptoms started 2 days ago and progressively was worsening.  He was found to have cholelithiasis with possible cholecystitis and surgery was consulted for possible cholecystectomy.  However INR was supratherapeutic and therefore surgery was postponed.     2024 CT angiogram abdominal pelvis  Impression:   1. No evidence of aortoiliac aneurysm or stenosis.   2. Shotty, somewhat numerous lymph nodes in the small bowel mesentery, which may be reactive, possibly viral mesenteric lymphadenitis.   3. Fecalization of distal small bowel, with no visible obstruction. Short segment of distal ileum with numerous diverticuli. Although this is somewhat unusual, there is no visible inflammatory change to suggest small bowel diverticulitis. Consider   follow-up scan if patient symptoms persist or worsen.   4. Questionable gallbladder \"sludge\". No visible gallbladder wall inflammation or evidence of biliary ductal dilatation.   5. No evidence of potential inflammatory focus, bowel obstruction, obstructive uropathy, or other acute disease is seen.     2024 liver ultrasound  Impression:  Cholelithiasis with gallbladder sludge. There is gallbladder wall thickening with mild pericholecystic fluid. These findings raise the possibility of cholecystitis. Clinical and laboratory correlation are recommended.     CBC " with mild leukocytosis (13.20) otherwise unremarkable.  INR at presentation was 4.51, currently 3.20.  He has been holding warfarin since admission.     01/19/24  Hematology/Oncology was consulted for further recommendations.  Patient denies any bleeding issues.  Denies nausea or vomiting.  Patient is on scheduled for cholecystectomy on Monday.     He/She  has a past medical history of Hyperlipidemia, Hypertension, and Marfan's disease.     PCP: Wally Henderson MD          Subjective     Denies any new issues.  Doing well.    ROS:    Review of Systems   Constitutional:  Positive for fatigue.   Neurological:  Positive for weakness.      MEDICATIONS:    Scheduled Meds:  atorvastatin, 10 mg, Oral, Nightly  enoxaparin, 1 mg/kg, Subcutaneous, Q12H  losartan, 100 mg, Oral, Daily  pantoprazole, 40 mg, Intravenous, BID AC  piperacillin-tazobactam, 3.375 g, Intravenous, Q8H  polyethylene glycol, 17 g, Oral, BID  potassium chloride ER, 40 mEq, Oral, Q4H  senna-docusate sodium, 2 tablet, Oral, BID  sertraline, 200 mg, Oral, Daily  sodium chloride, 10 mL, Intravenous, Q12H       Continuous Infusions:  lactated ringers, 1,000 mL, Last Rate: Stopped (01/22/24 1750)  Pharmacy Consult - Pharmacy to dose,   sodium chloride, 125 mL/hr, Last Rate: 125 mL/hr (01/22/24 2106)       PRN Meds:    acetaminophen **OR** acetaminophen **OR** acetaminophen    aluminum-magnesium hydroxide-simethicone    senna-docusate sodium **AND** polyethylene glycol **AND** bisacodyl **AND** bisacodyl    Calcium Replacement - Follow Nurse / BPA Driven Protocol    dicyclomine    HYDROcodone-acetaminophen    Magnesium Standard Dose Replacement - Follow Nurse / BPA Driven Protocol    metoclopramide    nitroglycerin    ondansetron    Pharmacy Consult - Pharmacy to dose    Phosphorus Replacement - Follow Nurse / BPA Driven Protocol    Potassium Replacement - Follow Nurse / BPA Driven Protocol    [COMPLETED] Insert Peripheral IV **AND** sodium chloride     "sodium chloride    sodium chloride     ALLERGIES:    Allergies   Allergen Reactions    Latex Other (See Comments)     Primary care provider        Objective    VITALS:   /67 (BP Location: Left arm, Patient Position: Lying)   Pulse 59   Temp 98.1 °F (36.7 °C) (Oral)   Resp 19   Ht 190.5 cm (75\")   Wt 72.7 kg (160 lb 3.2 oz)   SpO2 94%   BMI 20.02 kg/m²     PHYSICAL EXAM: (performed by MD)  Physical Exam  Vitals and nursing note reviewed.   Constitutional:       General: He is not in acute distress.     Appearance: He is not diaphoretic.   HENT:      Head: Normocephalic and atraumatic.   Eyes:      General: No scleral icterus.        Right eye: No discharge.         Left eye: No discharge.      Conjunctiva/sclera: Conjunctivae normal.   Neck:      Thyroid: No thyromegaly.   Cardiovascular:      Rate and Rhythm: Normal rate and regular rhythm.      Heart sounds: Normal heart sounds.      No friction rub. No gallop.   Pulmonary:      Effort: Pulmonary effort is normal. No respiratory distress.      Breath sounds: No stridor. No wheezing.   Abdominal:      General: Bowel sounds are normal.      Palpations: Abdomen is soft. There is no mass.      Tenderness: There is no abdominal tenderness. There is no guarding or rebound.   Musculoskeletal:         General: No tenderness. Normal range of motion.      Cervical back: Normal range of motion and neck supple.   Lymphadenopathy:      Cervical: No cervical adenopathy.   Skin:     General: Skin is warm.      Findings: No erythema or rash.   Neurological:      Mental Status: He is alert and oriented to person, place, and time.      Motor: No abnormal muscle tone.   Psychiatric:         Behavior: Behavior normal.       I have reexamined the patient and the results are consistent with the previously documented exam. Matilda Recinos MD        RECENT LABS:  Lab Results (last 24 hours)       Procedure Component Value Units Date/Time    Tissue Pathology Exam " [[id number]] Collected: 01/22/24 1723    Specimen: Tissue from Gallbladder Updated: 01/23/24 0736    Magnesium [[id number]]  (Normal) Collected: 01/23/24 0430    Specimen: Blood from Arm, Left Updated: 01/23/24 0519     Magnesium 1.6 mg/dL     Comprehensive Metabolic Panel [[id number]]  (Abnormal) Collected: 01/23/24 0430    Specimen: Blood from Arm, Left Updated: 01/23/24 0519     Glucose 78 mg/dL      BUN 10 mg/dL      Creatinine 0.61 mg/dL      Sodium 137 mmol/L      Potassium 3.5 mmol/L      Chloride 100 mmol/L      CO2 26.0 mmol/L      Calcium 9.3 mg/dL      Total Protein 6.2 g/dL      Albumin 3.5 g/dL      ALT (SGPT) 36 U/L      AST (SGOT) 34 U/L      Alkaline Phosphatase 55 U/L      Total Bilirubin 0.5 mg/dL      Globulin 2.7 gm/dL      A/G Ratio 1.3 g/dL      BUN/Creatinine Ratio 16.4     Anion Gap 11.0 mmol/L      eGFR 125.3 mL/min/1.73     Narrative:      GFR Normal >60  Chronic Kidney Disease <60  Kidney Failure <15      Protime-INR [[id number]]  (Abnormal) Collected: 01/23/24 0430    Specimen: Blood from Arm, Left Updated: 01/23/24 0455     Protime 13.5 Seconds      INR 1.26    CBC & Differential [[id number]]  (Abnormal) Collected: 01/23/24 0430    Specimen: Blood from Arm, Left Updated: 01/23/24 0449    Narrative:      The following orders were created for panel order CBC & Differential.  Procedure                               Abnormality         Status                     ---------                               -----------         ------                     CBC Auto Differential[[id number]]        Abnormal            Final result                 Please view results for these tests on the individual orders.    CBC Auto Differential [[id number]]  (Abnormal) Collected: 01/23/24 0430    Specimen: Blood from Arm, Left Updated: 01/23/24 0449     WBC 10.00 10*3/mm3      RBC 4.50 10*6/mm3      Hemoglobin 13.4 g/dL      Hematocrit 39.7 %      MCV 88.4 fL      MCH 29.9 pg      MCHC 33.8 g/dL      RDW 13.5 %       RDW-SD 41.6 fl      MPV 8.3 fL      Platelets 266 10*3/mm3      Neutrophil % 73.3 %      Lymphocyte % 16.8 %      Monocyte % 8.8 %      Eosinophil % 0.4 %      Basophil % 0.7 %      Neutrophils, Absolute 7.30 10*3/mm3      Lymphocytes, Absolute 1.70 10*3/mm3      Monocytes, Absolute 0.90 10*3/mm3      Eosinophils, Absolute 0.00 10*3/mm3      Basophils, Absolute 0.10 10*3/mm3      nRBC 0.0 /100 WBC     Protime-INR [[id number]]  (Abnormal) Collected: 01/22/24 1232    Specimen: Blood Updated: 01/22/24 1303     Protime 13.4 Seconds      INR 1.25    Comprehensive Metabolic Panel [[id number]]  (Abnormal) Collected: 01/22/24 0924    Specimen: Blood from Arm, Left Updated: 01/22/24 1013     Glucose 88 mg/dL      BUN 12 mg/dL      Creatinine 0.66 mg/dL      Sodium 138 mmol/L      Potassium 3.7 mmol/L      Comment: Slight hemolysis detected by analyzer. Result may be falsely elevated.        Chloride 102 mmol/L      CO2 26.0 mmol/L      Calcium 9.6 mg/dL      Total Protein 6.7 g/dL      Albumin 3.7 g/dL      ALT (SGPT) 29 U/L      AST (SGOT) 22 U/L      Alkaline Phosphatase 54 U/L      Total Bilirubin 0.7 mg/dL      Globulin 3.0 gm/dL      A/G Ratio 1.2 g/dL      BUN/Creatinine Ratio 18.2     Anion Gap 10.0 mmol/L      eGFR 122.4 mL/min/1.73     Narrative:      GFR Normal >60  Chronic Kidney Disease <60  Kidney Failure <15      Magnesium [[id number]]  (Normal) Collected: 01/22/24 0924    Specimen: Blood from Arm, Left Updated: 01/22/24 1013     Magnesium 2.0 mg/dL             PENDING RESULTS:     IMAGING REVIEWED:  No radiology results for the last day    Assessment & Plan   ASSESSMENT:  Supratherapeutic INR  Marfan's syndrome  Mechanical valve (aortic)  - Patient on long-term anticoagulation with Warfarin for above. Patient reports dose is 7.5mg daily.    - INR on admission 4.51 -> 5.45 -> 3.20.  - Warfarin has been held since 1/16. 1/19/2024-INR 3.20--INR today is 2.0.  Patient to receive [facility] on-call to the OR  - Cardiology  consulted and recommends Lovenox bridge closer when INR closer to 2.5  - Lovenox bridge started 1/20/2024. Will continue Lovenox for surgery     Abdominal pain  Cholecystitis  - CT and US imaging, along with leukocytosis concerning for cholecystitis.   - General surgery has been consulted and is holding off on surgery until INR is less than or equal to 1.5  For surgery today    PLAN:  Continue to hold warfarin  Post op care  Daily CBCs  Continue Lovenox.  Cholecystectomy planned for 1/22/2024            Electronically signed by Matilda Recinos MD, 01/26/24, 4:18 PM EST.

## 2024-01-23 NOTE — CASE MANAGEMENT/SOCIAL WORK
Continued Stay Note  [facility]     Patient Name: James Rodriguez  MRN: [id number]  Today's Date: 1/23/2024    Admit Date: 1/16/2024    Plan: Return home with S.O   Discharge Plan       Row Name 01/23/24 1039       Plan    Plan Comments Barriers: Pain management, Clear liquid diet. IV Protonix, IV antibiotics. IVFs.                    Phone communication or documentation only - no physical contact with patient or family.   Ana COOPER,RN Case Manager  [facility]  Phone: Desk- [phone] cell- [phone]

## 2024-01-23 NOTE — PLAN OF CARE
Problem: Adult Inpatient Plan of Care  Goal: Plan of Care Review  Outcome: Ongoing, Progressing  Flowsheets (Taken 1/23/2024 0111)  Outcome Evaluation:   pt s/p GB removal   po pain meds for pain control   liquid diet maintained   ivf's and abt's continued. 4 incision sites (3 covered w/bandaids) look good. TAL drain with mod drainage documentated. will continue to monitor.  Goal: Patient-Specific Goal (Individualized)  Outcome: Ongoing, Progressing  Goal: Absence of Hospital-Acquired Illness or Injury  Outcome: Ongoing, Progressing  Intervention: Identify and Manage Fall Risk  Recent Flowsheet Documentation  Taken 1/23/2024 0000 by Lorene Vital RN  Safety Promotion/Fall Prevention:   safety round/check completed   nonskid shoes/slippers when out of bed  Taken 1/22/2024 2200 by Lorene Vital RN  Safety Promotion/Fall Prevention:   safety round/check completed   nonskid shoes/slippers when out of bed  Taken 1/22/2024 1943 by Lorene Vital RN  Safety Promotion/Fall Prevention:   safety round/check completed   nonskid shoes/slippers when out of bed  Intervention: Prevent Skin Injury  Recent Flowsheet Documentation  Taken 1/23/2024 0000 by Lorene Vital RN  Body Position:   position changed independently   30 degrees   sitting up in bed  Taken 1/22/2024 1943 by Lorene Vital RN  Body Position:   sitting up in bed   position changed independently   30 degrees  Goal: Optimal Comfort and Wellbeing  Outcome: Ongoing, Progressing  Intervention: Monitor Pain and Promote Comfort  Recent Flowsheet Documentation  Taken 1/22/2024 2247 by Lorene Vital RN  Pain Management Interventions: see MAR  Goal: Readiness for Transition of Care  Outcome: Ongoing, Progressing   Goal Outcome Evaluation:              Outcome Evaluation: pt s/p GB removal; po pain meds for pain control; liquid diet maintained; ivf's and abt's continued. 4 incision sites (3 covered w/bandaids) look good. TAL drain with mod drainage documentated. will  continue to monitor.

## 2024-01-23 NOTE — OP NOTE
Operative Report    Patient Name:  James Rodriguez  YOB: 1985    Date of Surgery:  1/22/2024    Pre-op Diagnosis:   Acute calculus cholecystitis       Post-op Diagnosis:   Acute on chronic calculus cholecystitis    Procedure(s):  Robotic assisted laparoscopic subtotal cholecystectomy (minimal infundibulum left of the gallbladder)    Staff:  Surgeon(s):  Chandrika Bran MD    Circulator: Patti Javier RN CSA; Tashia Murcia RN; Lazarus Zarco RN; Mars Carson RN  Scrub Person: Jackie Huynh; Ana Dolan; Joe Gillespie  Assistant: Demi Son APRN  was responsible for performing the following activities: Closing, Placing Dressing, and Held/Positioned Camera and their skilled assistance was necessary for the success of this case.        Anesthesia: General    Estimated Blood Loss: 500 mL    Implants:    Implant Name Type Inv. Item Serial No.  Lot No. LRB No. Used Action   HEMOST ABS SURGICEL ORIG 2X14IN STRL - [id number] Implant HEMOST ABS SURGICEL ORIG 2X14IN STRL  [employer] J [id number] N/A 1 Implanted       Specimen:          Specimens       ID Source Type Tests Collected By Collected At Frozen?    A Gallbladder Tissue TISSUE PATHOLOGY EXAM   Chandrika Bran MD 1/22/24 3302                 Findings: Acute on chronic cholecystitis.  Thickened gallbladder wall up to approximately 15 mm.  Cholelithiasis.  Clearly visualized gallbladder neck from inside of the gallbladder.  Unable to dissect out the cystic duct safely.     Complications: None immediate    Clinical Indications: The patient is a 38 year old male with a history of Marfan syndrome with mechanical aortic valve on warfarin and prior history of laparotomy as an infant for bowel perforation who presented with abdominal pain.  Workup subsequently revealed the diagnosis of acute calculus cholecystitis.  Cholecystectomy was recommended however was delayed due to supratherapeutic INR.  The surgery  along with the associated risks, benefits, and alternatives were discussed with the patient. The risks include but are not limited to bleeding, infection, hernia, conversion to open, and common bile duct injury requiring additional procedures.  We discussed he was high risk for complications and bleeding due to anticoagulation, prior laparotomy as an infant, extensive inflammation on imaging.  The patient expressed understanding and wished to proceed. Informed consent was obtained.    Description of Procedure:  The patient was brought to the operating room and placed in the supine position with both arms out. Bilateral sequential compression stockings placed on the lower extremities. The patient was induced and intubated by the Anesthesia service. Perioperative antibiotics were administered. The patient's abdomen was then prepped and draped in the usual sterile fashion. A time out was performed.    After confirming with anesthesia that an orogastric tube was in place and to suction we made a small stab incision in the left upper quadrant at [facility]. A water drop test was performed indicating we were likely intra-abdominal. Insufflation was initiated and a low opening pressure reassured us that we were intra-abdominal. We placed an 8mm robotic trocar just above the umbilicus. The camera was introduced and the abdomen was inspected to ensure we had not caused any injuries with placement of the veress needle or the initial trocar. Under direct visualization we placed a trocar one handbreadth to the left of the umbilicus and another one handbreadth to the right of the umbilicus. A fourth 8mm robotic trocar was placed in the left lateral subcostal region. The patient was placed in reverse Trendelenburg position with the right side up. The robot was docked and the camera and instruments loaded and positioned under direct visualization. At this point I sat down at the console to begin the dissection.    There was  omentum that was adherent to the gallbladder which was dissected free from the gallbladder with a combination of electrocautery and blunt dissection.  The gallbladder was extremely thickened and distended.  We attempted to empty the gallbladder using the laparoscopic needle aspirator.  We are able to aspirate approximately 50 cc of dark bile.  Even with decompression it was difficult to grasp the gallbladder for retraction due to how thickened and inflamed it was.  We started to take down peritoneal attachments fairly high up on the gallbladder to ensure we started in a safe area for dissection.  His tissues oozed diffusely throughout the case secondary to the amount of inflammation present.  In grasping the gallbladder it tore spilling some dark bile and gallstones in the right upper quadrant which was suctioned out.  The remaining contents of the gallbladder was suctioned out and gallstones were removed.  We were able to remove gallstones from the gallbladder neck and infundibulum and were able to see where the cystic duct came off of the gallbladder.  The inside of the gallbladder was partially ischemic.  It became clear due to the amount of inflammation it would not be safe to dissect out the cystic duct as this could result in significant bleeding and/or injury to the common bile duct.  At this time I elected to committing to do a subtotal cholecystectomy.  A large portion of the dome of the gallbladder and the body of the gallbladder was dissected free with electrocautery.  The gallbladder neck and infundibulum remained.  With a combination of blunt dissection and electrocautery we able to free up the majority of the gallbladder however were not able to dissect around the cystic duct.  Just above the orifice to the cystic duct we are able to free up up the posterior aspect of the gallbladder from the liver leaving part of the rind from the gallbladder on the liver bed.  We then divided the remaining  attachments of the gallbladder with electrocautery and while doing this we divided the cystic duct.  There was a small amount of bleeding from the cystic duct and it was plastic and hemostasis was achieved with the bipolar.  The right upper quadrant was then suctioned out of blood and clot and irrigated with copious amounts of saline.  There was a small amount of oozing in the area of the small remnant gallbladder left however I did not want to cauterize in this area and risk causing bleeding or injury to the common bile duct.  We placed Surgicel in this area and held pressure with a Raytec sponge.  The liver bed was cauterized.  At this point the area was inspected and was now hemostatic.  We elected to leave the Surgicel in place.  The gallbladder pieces were placed in an Endo Catch bag and pulled out through the port site to the left of the umbilicus.  The skin incision had to be extended and the fascia was bluntly enlarged.  We placed a 10 mm Chet-Sims drain in the right upper quadrant and the gallbladder fossa and was pulled out through the incision on the right side of the abdomen.  It was secured in place at the level of the skin with a 2-0 nylon suture.  The fascial opening to the left of the umbilicus was closed with three transfascial stitches using 0 Vicryl suture.  The fascial closure was checked and appeared well closed.  The left lateral port site was removed under direct visualization and was hemostatic.  Pneumoperitoneum was released supraumbilical port.  The incisions were closed with 4-0 vicryl, cleaned, and dressed with sterile dressings.    The patient tolerated the procedure well. He was awakened and extubated by anesthesia and then transferred to the recovery room in stable condition. At the completion of the case, all instrument, needle, and sponge counts were correct.    Note: Throughout the case we checked with firefly technology and in no time was ever able to visualize the common bile  duct and there was no bile or bile leak seen.     Chandrika Bran MD     Date: 1/22/2024  Time: 21:21 EST    This note was created using Dragon Voice Recognition software.

## 2024-01-24 LAB
ALBUMIN SERPL-MCNC: 3.7 G/DL (ref 3.5–5.2)
ALBUMIN/GLOB SERPL: 1.2 G/DL
ALP SERPL-CCNC: 58 U/L (ref 39–117)
ALT SERPL W P-5'-P-CCNC: 48 U/L (ref 1–41)
ANION GAP SERPL CALCULATED.3IONS-SCNC: 9 MMOL/L (ref 5–15)
AST SERPL-CCNC: 46 U/L (ref 1–40)
BASOPHILS # BLD AUTO: 0.1 10*3/MM3 (ref 0–0.2)
BASOPHILS NFR BLD AUTO: 0.9 % (ref 0–1.5)
BILIRUB SERPL-MCNC: 0.3 MG/DL (ref 0–1.2)
BUN SERPL-MCNC: 10 MG/DL (ref 6–20)
BUN/CREAT SERPL: 14.5 (ref 7–25)
CALCIUM SPEC-SCNC: 9.2 MG/DL (ref 8.6–10.5)
CHLORIDE SERPL-SCNC: 101 MMOL/L (ref 98–107)
CO2 SERPL-SCNC: 28 MMOL/L (ref 22–29)
CREAT SERPL-MCNC: 0.69 MG/DL (ref 0.76–1.27)
DEPRECATED RDW RBC AUTO: 43.8 FL (ref 37–54)
EGFRCR SERPLBLD CKD-EPI 2021: 120.7 ML/MIN/1.73
EOSINOPHIL # BLD AUTO: 0.2 10*3/MM3 (ref 0–0.4)
EOSINOPHIL NFR BLD AUTO: 2.9 % (ref 0.3–6.2)
ERYTHROCYTE [DISTWIDTH] IN BLOOD BY AUTOMATED COUNT: 13.9 % (ref 12.3–15.4)
GLOBULIN UR ELPH-MCNC: 3 GM/DL
GLUCOSE SERPL-MCNC: 76 MG/DL (ref 65–99)
HCT VFR BLD AUTO: 42.2 % (ref 37.5–51)
HGB BLD-MCNC: 14.1 G/DL (ref 13–17.7)
INR PPP: 1.25 (ref 2–3)
LYMPHOCYTES # BLD AUTO: 2 10*3/MM3 (ref 0.7–3.1)
LYMPHOCYTES NFR BLD AUTO: 24.3 % (ref 19.6–45.3)
MAGNESIUM SERPL-MCNC: 1.8 MG/DL (ref 1.6–2.6)
MCH RBC QN AUTO: 30.1 PG (ref 26.6–33)
MCHC RBC AUTO-ENTMCNC: 33.5 G/DL (ref 31.5–35.7)
MCV RBC AUTO: 89.9 FL (ref 79–97)
MONOCYTES # BLD AUTO: 0.8 10*3/MM3 (ref 0.1–0.9)
MONOCYTES NFR BLD AUTO: 9.7 % (ref 5–12)
NEUTROPHILS NFR BLD AUTO: 5 10*3/MM3 (ref 1.7–7)
NEUTROPHILS NFR BLD AUTO: 62.2 % (ref 42.7–76)
NRBC BLD AUTO-RTO: 0.1 /100 WBC (ref 0–0.2)
PLATELET # BLD AUTO: 288 10*3/MM3 (ref 140–450)
PMV BLD AUTO: 8.7 FL (ref 6–12)
POTASSIUM SERPL-SCNC: 3.8 MMOL/L (ref 3.5–5.2)
PROT SERPL-MCNC: 6.7 G/DL (ref 6–8.5)
PROTHROMBIN TIME: 13.4 SECONDS (ref 19.4–28.5)
RBC # BLD AUTO: 4.69 10*6/MM3 (ref 4.14–5.8)
SODIUM SERPL-SCNC: 138 MMOL/L (ref 136–145)
WBC NRBC COR # BLD AUTO: 8.1 10*3/MM3 (ref 3.4–10.8)

## 2024-01-24 PROCEDURE — 85025 COMPLETE CBC W/AUTO DIFF WBC: CPT | Performed by: STUDENT IN AN ORGANIZED HEALTH CARE EDUCATION/TRAINING PROGRAM

## 2024-01-24 PROCEDURE — 83735 ASSAY OF MAGNESIUM: CPT | Performed by: STUDENT IN AN ORGANIZED HEALTH CARE EDUCATION/TRAINING PROGRAM

## 2024-01-24 PROCEDURE — 80053 COMPREHEN METABOLIC PANEL: CPT | Performed by: STUDENT IN AN ORGANIZED HEALTH CARE EDUCATION/TRAINING PROGRAM

## 2024-01-24 PROCEDURE — 25010000002 PIPERACILLIN SOD-TAZOBACTAM PER 1 G: Performed by: SURGERY

## 2024-01-24 PROCEDURE — 85610 PROTHROMBIN TIME: CPT | Performed by: SURGERY

## 2024-01-24 PROCEDURE — 99232 SBSQ HOSP IP/OBS MODERATE 35: CPT | Performed by: NURSE PRACTITIONER

## 2024-01-24 PROCEDURE — 25810000003 SODIUM CHLORIDE 0.9 % SOLUTION: Performed by: SURGERY

## 2024-01-24 PROCEDURE — 25010000002 ENOXAPARIN PER 10 MG: Performed by: SURGERY

## 2024-01-24 PROCEDURE — 97162 PT EVAL MOD COMPLEX 30 MIN: CPT

## 2024-01-24 RX ORDER — WARFARIN SODIUM 7.5 MG/1
7.5 TABLET ORAL
Status: DISCONTINUED | OUTPATIENT
Start: 2024-01-24 | End: 2024-01-25 | Stop reason: HOSPADM

## 2024-01-24 RX ORDER — HYDROCODONE BITARTRATE AND ACETAMINOPHEN 5; 325 MG/1; MG/1
1 TABLET ORAL EVERY 6 HOURS PRN
Qty: 10 TABLET | Refills: 0 | Status: SHIPPED | OUTPATIENT
Start: 2024-01-24 | End: 2024-01-29

## 2024-01-24 RX ORDER — AMOXICILLIN AND CLAVULANATE POTASSIUM 875; 125 MG/1; MG/1
1 TABLET, FILM COATED ORAL EVERY 12 HOURS SCHEDULED
Status: DISCONTINUED | OUTPATIENT
Start: 2024-01-24 | End: 2024-01-25 | Stop reason: HOSPADM

## 2024-01-24 RX ADMIN — Medication 10 ML: at 17:50

## 2024-01-24 RX ADMIN — ATORVASTATIN CALCIUM 10 MG: 10 TABLET, FILM COATED ORAL at 20:29

## 2024-01-24 RX ADMIN — DOCUSATE SODIUM AND SENNOSIDES 2 TABLET: 8.6; 5 TABLET, FILM COATED ORAL at 08:58

## 2024-01-24 RX ADMIN — HYDROCODONE BITARTRATE AND ACETAMINOPHEN 1 TABLET: 5; 325 TABLET ORAL at 14:17

## 2024-01-24 RX ADMIN — ENOXAPARIN SODIUM 70 MG: 100 INJECTION SUBCUTANEOUS at 20:30

## 2024-01-24 RX ADMIN — Medication 10 ML: at 08:58

## 2024-01-24 RX ADMIN — HYDROCODONE BITARTRATE AND ACETAMINOPHEN 1 TABLET: 5; 325 TABLET ORAL at 05:39

## 2024-01-24 RX ADMIN — LOSARTAN POTASSIUM 100 MG: 50 TABLET, FILM COATED ORAL at 08:58

## 2024-01-24 RX ADMIN — POLYETHYLENE GLYCOL 3350 17 G: 17 POWDER, FOR SOLUTION ORAL at 08:58

## 2024-01-24 RX ADMIN — PANTOPRAZOLE SODIUM 40 MG: 40 INJECTION, POWDER, LYOPHILIZED, FOR SOLUTION INTRAVENOUS at 08:57

## 2024-01-24 RX ADMIN — SERTRALINE 200 MG: 100 TABLET, FILM COATED ORAL at 20:29

## 2024-01-24 RX ADMIN — PIPERACILLIN AND TAZOBACTAM 3.38 G: 3; .375 INJECTION, POWDER, FOR SOLUTION INTRAVENOUS at 05:38

## 2024-01-24 RX ADMIN — PIPERACILLIN AND TAZOBACTAM 3.38 G: 3; .375 INJECTION, POWDER, FOR SOLUTION INTRAVENOUS at 14:17

## 2024-01-24 RX ADMIN — HYDROCODONE BITARTRATE AND ACETAMINOPHEN 1 TABLET: 5; 325 TABLET ORAL at 17:56

## 2024-01-24 RX ADMIN — Medication 10 ML: at 20:30

## 2024-01-24 RX ADMIN — HYDROCODONE BITARTRATE AND ACETAMINOPHEN 1 TABLET: 5; 325 TABLET ORAL at 09:41

## 2024-01-24 RX ADMIN — WARFARIN SODIUM 7.5 MG: 7.5 TABLET ORAL at 17:50

## 2024-01-24 RX ADMIN — ENOXAPARIN SODIUM 70 MG: 100 INJECTION SUBCUTANEOUS at 08:58

## 2024-01-24 RX ADMIN — PANTOPRAZOLE SODIUM 40 MG: 40 INJECTION, POWDER, LYOPHILIZED, FOR SOLUTION INTRAVENOUS at 17:50

## 2024-01-24 RX ADMIN — SODIUM CHLORIDE 125 ML/HR: 9 INJECTION, SOLUTION INTRAVENOUS at 13:17

## 2024-01-24 RX ADMIN — AMOXICILLIN AND CLAVULANATE POTASSIUM 1 TABLET: 875; 125 TABLET, FILM COATED ORAL at 20:29

## 2024-01-24 NOTE — PROGRESS NOTES
[facility] MEDICINE SERVICE  DAILY PROGRESS NOTE    NAME: James Rodriguez  : 1985  MRN: [id number]      LOS: 5 days     PROVIDER OF SERVICE: Abhijeet Morris MD    Chief Complaint: Vomiting  James Rodriguez is a 38 y.o. male with PMH of Marfan's syndrome, mechanical aortic valve replacement , chronic anticoagulation, hypertension, hyperlipidemia who presented to [facility] on [facility]24 with complaints of abdominal pain.   Subjective:     Interval History:  History taken from: patient  Patient Complaints:  pain at surgical site improving with pain meds , feeling tired encouraged to be out of bed to chair  Review of Systems:   Review of Systems  14 point review of system unremarkable except mentioned above  Objective:     Vital Signs  Temp:  [97.4 °F (36.3 °C)-98.1 °F (36.7 °C)] 97.5 °F (36.4 °C)  Heart Rate:  [58-65] 61  Resp:  [17-19] 17  BP: (106-141)/(52-77) 106/65   Body mass index is 20.02 kg/m².    Physical Exam  Physical Exam  HENT:      Head: Atraumatic.      Mouth/Throat:      Mouth: Mucous membranes are moist.   Eyes:      Pupils: Pupils are equal, round, and reactive to light.   Cardiovascular:      Rate and Rhythm: Normal rate.      Heart sounds: Murmur heard.   Pulmonary:      Effort: Pulmonary effort is normal.      Breath sounds: Normal breath sounds.   Abdominal:      Palpations: Abdomen is soft.      Comments: TAL drain in place , clean surgical dressing    Musculoskeletal:         General: Normal range of motion.      Cervical back: Neck supple.   Skin:     General: Skin is warm.   Neurological:      General: No focal deficit present.      Mental Status: He is alert and oriented to person, place, and time.   Psychiatric:         Mood and Affect: Mood normal.         Behavior: Behavior normal.         Scheduled Meds   amoxicillin-clavulanate, 1 tablet, Oral, Q12H  atorvastatin, 10 mg, Oral, Nightly  enoxaparin, 1 mg/kg, Subcutaneous, Q12H  losartan, 100 mg, Oral,  Daily  pantoprazole, 40 mg, Intravenous, BID AC  polyethylene glycol, 17 g, Oral, BID  senna-docusate sodium, 2 tablet, Oral, BID  sertraline, 200 mg, Oral, Daily  sodium chloride, 10 mL, Intravenous, Q12H  warfarin, 7.5 mg, Oral, Daily       PRN Meds     acetaminophen **OR** acetaminophen **OR** acetaminophen    aluminum-magnesium hydroxide-simethicone    senna-docusate sodium **AND** polyethylene glycol **AND** bisacodyl **AND** bisacodyl    Calcium Replacement - Follow Nurse / BPA Driven Protocol    dicyclomine    HYDROcodone-acetaminophen    Magnesium Standard Dose Replacement - Follow Nurse / BPA Driven Protocol    metoclopramide    nitroglycerin    ondansetron    Pharmacy Consult - Pharmacy to dose    Phosphorus Replacement - Follow Nurse / BPA Driven Protocol    Potassium Replacement - Follow Nurse / BPA Driven Protocol    [COMPLETED] Insert Peripheral IV **AND** sodium chloride    sodium chloride    sodium chloride   Infusions  Pharmacy Consult - Pharmacy to dose,   sodium chloride, 125 mL/hr, Last Rate: Stopped (01/24/24 1417)          Diagnostic Data    Results from last 7 days   Lab Units 01/24/24  0401   WBC 10*3/mm3 8.10   HEMOGLOBIN g/dL 14.1   HEMATOCRIT % 42.2   PLATELETS 10*3/mm3 288   GLUCOSE mg/dL 76   CREATININE mg/dL 0.69*   BUN mg/dL 10   SODIUM mmol/L 138   POTASSIUM mmol/L 3.8   AST (SGOT) U/L 46*   ALT (SGPT) U/L 48*   ALK PHOS U/L 58   BILIRUBIN mg/dL 0.3   ANION GAP mmol/L 9.0       No radiology results for the last day      I reviewed the patient's new clinical results.    Assessment/Plan:     Active and Resolved Problems  #Right upper quadrant abdominal pain  #s/p cholecystectomy-robotic assisted laparoscopic  #Acute cholecystitis  -Presented with abdominal pain  - Imaging  Us abd Cholelithiasis with gallbladder sludge. There is gallbladder wall thickening with mild pericholecystic fluid. These findings raise the possibility of cholecystitis.   -Surgical team input appreciated status  post robotic assisted cholecystectomy placement  of drain, introp Gallbladder was super inflamed and was partially necrotic as wel  - po Augmentin     #Mechanical aortic valve  #On warfarin supratherapeutic INR  #marfan  syndrome  - Oncology team consult appreciated-patient on home dose of warfarin 7.5 mg daily  - Stopped and being bridged with Lovenox since 01/20/2024  -Will continue Lovenox and upon discharge continue following  -Will bridge with Lovenox upon discharge and outpatient follow-up for INR    #Essential hypertension  - Continue home medications-losartan 100 mg daily    PT pending     DVT prophylaxis:  Medical and mechanical DVT prophylaxis orders are present.     Code status is   Code Status and Medical Interventions:   Ordered at: 01/16/24 1046     Level Of Support Discussed With:    Patient     Code Status (Patient has no pulse and is not breathing):    CPR (Attempt to Resuscitate)     Medical Interventions (Patient has pulse or is breathing):    Full Support       Plan for disposition:home  in 1-2 days    Time: 32 minutes    Signature: Electronically signed by Abhijeet Morris MD, 01/24/24, 15:40 EST.  [facility] Hospitalist Team

## 2024-01-24 NOTE — PROGRESS NOTES
General Surgery Inpatient Progress Note      Name: James Rodriguez ADMIT: 2024   : 1985  PCP: Wally Henderson MD    MRN: [id number] LOS: 4 days   AGE/SEX: 38 y.o. male  ROOM: [address] [place] [address]      Patient Care Team:  Wally Henderson MD as PCP - General (Internal Medicine)  Chief Complaint   Patient presents with    Illness       Subjective:  Patient seen and examined.  He is doing well.  Only incisional soreness.  Would like his diet advanced.  Intraoperative findings were discussed with the patient.    Medications:  atorvastatin, 10 mg, Oral, Nightly  enoxaparin, 1 mg/kg, Subcutaneous, Q12H  losartan, 100 mg, Oral, Daily  pantoprazole, 40 mg, Intravenous, BID AC  piperacillin-tazobactam, 3.375 g, Intravenous, Q8H  polyethylene glycol, 17 g, Oral, BID  senna-docusate sodium, 2 tablet, Oral, BID  sertraline, 200 mg, Oral, Daily  sodium chloride, 10 mL, Intravenous, Q12H         acetaminophen **OR** acetaminophen **OR** acetaminophen    aluminum-magnesium hydroxide-simethicone    senna-docusate sodium **AND** polyethylene glycol **AND** bisacodyl **AND** bisacodyl    Calcium Replacement - Follow Nurse / BPA Driven Protocol    dicyclomine    HYDROcodone-acetaminophen    Magnesium Standard Dose Replacement - Follow Nurse / BPA Driven Protocol    metoclopramide    nitroglycerin    ondansetron    Pharmacy Consult - Pharmacy to dose    Phosphorus Replacement - Follow Nurse / BPA Driven Protocol    Potassium Replacement - Follow Nurse / BPA Driven Protocol    [COMPLETED] Insert Peripheral IV **AND** sodium chloride    sodium chloride    sodium chloride     Vitals:  Temp:  [97.4 °F (36.3 °C)-98.1 °F (36.7 °C)] 97.7 °F (36.5 °C)  Heart Rate:  [57-61] 60  Resp:  [15-19] 17  BP: (106-130)/(52-73) 125/73     Physical Exam:  No acute distress, alert  Nonlabored respirations  Abdomen soft, nondistended, appropriately tender to palpation around incisions, incisions clean/dry/intact with dressings in  place  TAL drain in place with nonbilious serous sanguinous output, slightly brown secondary to Surgicel    Labs:  Results from last 7 days   Lab Units 01/23/24  0430 01/22/24  0535 01/21/24  0408 01/20/24  0451 01/19/24  0442   WBC 10*3/mm3 10.00 7.40 11.00*   < > 13.20*   HEMOGLOBIN g/dL 13.4 14.3 17.0   < > 16.2   HEMATOCRIT % 39.7 43.0 49.9   < > 48.1   PLATELETS 10*3/mm3 266 252 270   < > 201   MONOCYTES % %  --   --   --   --  9.0    < > = values in this interval not displayed.     Results from last 7 days   Lab Units 01/23/24  0430 01/22/24  0924 01/21/24  0408   SODIUM mmol/L 137 138 136   POTASSIUM mmol/L 3.5 3.7 3.8   CHLORIDE mmol/L 100 102 99   CO2 mmol/L 26.0 26.0 23.0   BUN mg/dL 10 12 9   CREATININE mg/dL 0.61* 0.66* 0.59*   CALCIUM mg/dL 9.3 9.6 9.9   BILIRUBIN mg/dL 0.5 0.7 0.9   ALK PHOS U/L 55 54 69   ALT (SGPT) U/L 36 29 34   AST (SGOT) U/L 34 22 27   GLUCOSE mg/dL 78 88 134*     Results from last 7 days   Lab Units 01/23/24  0430 01/22/24  1232 01/22/24  0526   INR  1.26* 1.25* 2.00     Assessment and Plan:  38 y.o. male postop day #1 status post robotic assisted laparoscopic subtotal cholecystectomy.  Doing well.    - Will advance to regular diet  - As needed pain control  - Continue TAL drain  - Continue antibiotics  - Continue to hold Coumadin but continue therapeutic Lovenox  - If TAL drain remains nonbilious then likely will be okay to resume Coumadin tomorrow  -If TAL drain becomes bilious we will need to ask our GI colleagues to evaluate for possible ERCP  - Patient will be discharged home with TAL drain in place    This note was created using Dragon Voice Recognition software.    Chandrika Bran MD  01/23/24  22:08 EST

## 2024-01-24 NOTE — PROGRESS NOTES
"Pharmacy dosing service  Anticoagulant  Warfarin     Subjective:    James Rodriguez is a 38 y.o.male being continued on warfarin for aortic valve replacement.    INR Goal: 2 - 3  Home medication?: warfarin 7.5 mg PO daily  Bridge Therapy Present?:  Yes, Enoxaparin 70 mg SQ Q12H  Interacting Medications Evaluation (New/Present/Discontinued): pip/tazo (may increase INR)  Additional Contributing Factors:       Assessment/Plan:    Warfarin has been held for surgery. Okay to resume warfarin today per Verónica Peoples NP with pharmacy to dose. Will initiate warfarin 7.5 mg daily and continue treatment enoxaparin until INR >2.    Continue to monitor and adjust based on INR.         Date 1/24           INR 1.25           Dose 7.5 mg               Objective:  [Ht: 190.5 cm (75\"); Wt: 72.7 kg (160 lb 3.2 oz); BMI: Body mass index is 20.02 kg/m².]    Lab Results   Component Value Date    ALBUMIN 3.7 01/24/2024     Lab Results   Component Value Date    INR 1.25 (L) 01/24/2024    INR 1.26 (L) 01/23/2024    INR 1.25 (H) 01/22/2024    PROTIME 13.4 (L) 01/24/2024    PROTIME 13.5 (L) 01/23/2024    PROTIME 13.4 (H) 01/22/2024     Lab Results   Component Value Date    HGB 14.1 01/24/2024    HGB 13.4 01/23/2024    HGB 14.3 01/22/2024     Lab Results   Component Value Date    HCT 42.2 01/24/2024    HCT 39.7 01/23/2024    HCT 43.0 01/22/2024       Tamera Rey, PharmD  01/24/24 13:28 EST    "

## 2024-01-24 NOTE — PLAN OF CARE
Goal Outcome Evaluation:  Plan of Care Reviewed With: patient        Progress: improving  Outcome Evaluation: Patient resting in bed. Pain is a 4 on a pain scale from 0-10. Norco 5mg po given for pain control, NS @125mL/hr. IV Abx, TAL drain with dark red blood. continue to monitor

## 2024-01-24 NOTE — DISCHARGE INSTRUCTIONS
Call the office to schedule followup for next week for evaluation for drain removal   Monitor TAL drain output  Call the office immediately if TAL drain output turns green  May shower soap and water over Steri-Strips; no baths/soaking   Low fat diet x 2 wks  No lifting >10-15 lbs x 2 wks  Over the counter stool softener twice daily until off narcotics and having regular bowel movements  Milk of magnesia as needed if still constipated with stool softeners

## 2024-01-24 NOTE — THERAPY EVALUATION
Patient Name: James Rodriguez  : 1985    MRN: [id number]                              Today's Date: 2024       Admit Date: 2024    Visit Dx:     ICD-10-CM ICD-9-CM   1. Nausea vomiting and diarrhea  R11.2 787.91    R19.7 787.01   2. Abdominal pain, unspecified abdominal location  R10.9 789.00   3. Leukocytosis, unspecified type  D72.829 288.60   4. Cholecystitis  K81.9 575.10     Patient Active Problem List   Diagnosis    Vomiting    Marfan's syndrome    Essential hypertension     Past Medical History:   Diagnosis Date    History of open heart surgery     ,     Hyperlipidemia     Hypertension     Marfan's disease      Past Surgical History:   Procedure Laterality Date    CARDIAC VALVE REPLACEMENT          CHOLECYSTECTOMY N/A 2024    Procedure: CHOLECYSTECTOMY LAPAROSCOPIC WITH DAVINCI ROBOT;  Surgeon: Chandrika Bran MD;  Location: [facility] OR;  Service: Robotics - DaVinci;  Laterality: N/A;      General Information       Row Name 24          Physical Therapy Time and Intention    Document Type evaluation  -SS     Mode of Treatment physical therapy  -       Row Name 24 170          General Information    Patient Profile Reviewed yes  -SS     Prior Level of Function independent:;community mobility;ADL's  -       Row Name 24 170          Living Environment    People in Home significant other  -       Row Name 24 170          Home Main Entrance    Number of Stairs, Main Entrance three  -SS     Stair Railings, Main Entrance railings safe and in good condition  -SS       Row Name 24          Cognition    Orientation Status (Cognition) oriented x 4  -SS               User Key  (r) = Recorded By, (t) = Taken By, (c) = Cosigned By      Initials Name Provider Type    SS Rebeca Doyle PT Physical Therapist                   Mobility       Row Name 24          Bed Mobility    Bed Mobility bed mobility (all) activities  -SS      All Activities, [facility] (Bed Mobility) independent  -       Row Name 01/24/24 1705          Sit-Stand Transfer    Sit-Stand [facility] (Transfers) independent  -[facility] Name 01/24/24 1705          Gait/Stairs (Locomotion)    [facility] Level (Gait) independent  -     Distance in Feet (Gait) 200'  -     Comment, (Gait/Stairs) gait WNL  -               User Key  (r) = Recorded By, (t) = Taken By, (c) = Cosigned By      Initials Name Provider Type     Reebca Doyle PT Physical Therapist                   Obj/Interventions       Row Name 01/24/24 1706          Range of Motion Comprehensive    General Range of Motion no range of motion deficits identified  -SS       Row Name 01/24/24 1706          Strength Comprehensive (MMT)    General Manual Muscle Testing (MMT) Assessment no strength deficits identified  -SS       Row Name 01/24/24 1706          Balance    Balance Assessment standing dynamic balance  -     Dynamic Standing Balance independent  -SS       Row Name 01/24/24 1706          Sensory Assessment (Somatosensory)    Sensory Assessment (Somatosensory) sensation intact  -               User Key  (r) = Recorded By, (t) = Taken By, (c) = Cosigned By      Initials Name Provider Type     Rebeca Doyle PT Physical Therapist                   Goals/Plan    No documentation.                  Clinical Impression       Row Name 01/24/24 1706          Pain    Pretreatment Pain Rating 0/10 - no pain  -     Posttreatment Pain Rating 0/10 - no pain  -SS       Row Name 01/24/24 1706          Plan of Care Review    Plan of Care Reviewed With patient  -     Outcome Evaluation 39 y/o M who presented with abdominal pain. History of Marfan syndrome. Diagnosed with cholecystitis and awaited surgery due to elevated INR. He is typically independent at baseline with ADLs and mobility. He lives with his significant other. He appears at or near baseline this date. He is ambulating  independently. Advised on how to complete bed mobility to minimize abdominal discomfort. He is safe to d/c home from PT standpoint with no additional PT needs.  -       Row Name 01/24/24 1706          Therapy Assessment/Plan (PT)    Criteria for Skilled Interventions Met (PT) no  -SS     Therapy Frequency (PT) evaluation only  -       Row Name 01/24/24 1706          Positioning and Restraints    Pre-Treatment Position in bed  -     Post Treatment Position bed  -               User Key  (r) = Recorded By, (t) = Taken By, (c) = Cosigned By      Initials Name Provider Type    SS Rebeca Doyle, PT Physical Therapist                   Outcome Measures       Row Name 01/24/24 0801          How much help from another person do you currently need...    Turning from your back to your side while in flat bed without using bedrails? 4  -DF     Moving from lying on back to sitting on the side of a flat bed without bedrails? 4  -DF     Moving to and from a bed to a chair (including a wheelchair)? 4  -DF     Standing up from a chair using your arms (e.g., wheelchair, bedside chair)? 4  -DF     Climbing 3-5 steps with a railing? 4  -DF     To walk in hospital room? 4  -DF     AM-PAC 6 Clicks Score (PT) 24  -DF     Highest Level of Mobility Goal 8 --> Walked 250 feet or more  -DF               User Key  (r) = Recorded By, (t) = Taken By, (c) = Cosigned By      Initials Name Provider Type    DF Marcia Isabel, RN Registered Nurse                                 Physical Therapy Education       Title: PT OT SLP Therapies (Done)       Topic: Physical Therapy (Done)       Point: Mobility training (Done)       Learning Progress Summary             Patient Acceptance, E, VU by  at 1/24/2024 1712                                         User Key       Initials Effective Dates Name Provider Type Discipline     06/16/21 -  Rebeca Doyle PT Physical Therapist PT                  PT Recommendation and Plan     Plan of  Care Reviewed With: patient  Outcome Evaluation: 39 y/o M who presented with abdominal pain. History of Marfan syndrome. Diagnosed with cholecystitis and awaited surgery due to elevated INR. He is typically independent at baseline with ADLs and mobility. He lives with his significant other. He appears at or near baseline this date. He is ambulating independently. Advised on how to complete bed mobility to minimize abdominal discomfort. He is safe to d/c home from PT standpoint with no additional PT needs.     Time Calculation:   PT Evaluation Complexity  History, PT Evaluation Complexity: 3 or more personal factors and/or comorbidities  Examination of Body Systems (PT Eval Complexity): total of 3 or more elements  Clinical Presentation (PT Evaluation Complexity): evolving  Clinical Decision Making (PT Evaluation Complexity): moderate complexity  Overall Complexity (PT Evaluation Complexity): moderate complexity     PT Charges       Row Name 01/24/24 1712             Time Calculation    Start Time 1630  -SS      Stop Time 1647  -SS      Time Calculation (min) 17 min  -      PT Received On 01/24/24  -         Time Calculation- PT    Total Timed Code Minutes- PT 0 minute(s)  -                User Key  (r) = Recorded By, (t) = Taken By, (c) = Cosigned By      Initials Name Provider Type    SS Rebeca Doyle, PT Physical Therapist                  Therapy Charges for Today       Code Description Service Date Service Provider Modifiers Qty    11988495293 HC PT EVAL MOD COMPLEXITY 3 1/24/2024 Rebeca Doyle PT GP 1            PT G-Codes  AM-PAC 6 Clicks Score (PT): 24  PT Discharge Summary  Anticipated Discharge Disposition (PT): home    Rebeca Doyle PT  1/24/2024

## 2024-01-24 NOTE — CASE MANAGEMENT/SOCIAL WORK
Continued Stay Note  [facility]     Patient Name: James Rodriguez  MRN: [id number]  Today's Date: 1/24/2024    Admit Date: 1/16/2024    Plan: Return home with S.O   Discharge Plan       Row Name 01/24/24 1142       Plan    Plan Comments Barriers: Followed by General Surgery, GI, HemOnc, IV Protonix, IV Antibiotics, IVFs.  TAL drain.  At discharge Mr. Rodriguez plans to return home. MATHEW ceron follow for discharge needs                           Phone communication or documentation only - no physical contact with patient or family.   Ana MCCAULEYN,RN Case Manager  [facility]  Phone: Desk- [phone] cell- [phone]

## 2024-01-24 NOTE — PROGRESS NOTES
General Surgery Inpatient Progress Note      Name: James Rodriguez ADMIT: 2024   : 1985  PCP: Wally Henderson MD    MRN: [id number] LOS: 5 days   AGE/SEX: 38 y.o. male  ROOM: [address] [place] [address]      Patient Care Team:  Wally Henderson MD as PCP - General (Internal Medicine)  Chief Complaint   Patient presents with    Illness       Subjective:  Patient seen and examined.  He continues to do well.  Tolerating a diet no nausea no vomiting.  Continues to have soreness.  TAL drain remains nonbilious.    Medications:  atorvastatin, 10 mg, Oral, Nightly  enoxaparin, 1 mg/kg, Subcutaneous, Q12H  losartan, 100 mg, Oral, Daily  pantoprazole, 40 mg, Intravenous, BID AC  piperacillin-tazobactam, 3.375 g, Intravenous, Q8H  polyethylene glycol, 17 g, Oral, BID  senna-docusate sodium, 2 tablet, Oral, BID  sertraline, 200 mg, Oral, Daily  sodium chloride, 10 mL, Intravenous, Q12H         acetaminophen **OR** acetaminophen **OR** acetaminophen    aluminum-magnesium hydroxide-simethicone    senna-docusate sodium **AND** polyethylene glycol **AND** bisacodyl **AND** bisacodyl    Calcium Replacement - Follow Nurse / BPA Driven Protocol    dicyclomine    HYDROcodone-acetaminophen    Magnesium Standard Dose Replacement - Follow Nurse / BPA Driven Protocol    metoclopramide    nitroglycerin    ondansetron    Pharmacy Consult - Pharmacy to dose    Phosphorus Replacement - Follow Nurse / BPA Driven Protocol    Potassium Replacement - Follow Nurse / BPA Driven Protocol    [COMPLETED] Insert Peripheral IV **AND** sodium chloride    sodium chloride    sodium chloride     Vitals:  Temp:  [97.4 °F (36.3 °C)-98.1 °F (36.7 °C)] 97.5 °F (36.4 °C)  Heart Rate:  [57-65] 59  Resp:  [15-19] 17  BP: (106-141)/(52-77) 141/77     Physical Exam:  No acute distress, alert  Nonlabored respirations  Abdomen soft, nondistended, appropriately tender to palpation around incisions, incisions clean/dry/intact with dressings in place  TAL  drain in place with nonbilious serous sanguinous output, slightly brown secondary to Surgicel    Labs:  Results from last 7 days   Lab Units 01/24/24  0401 01/23/24  0430 01/22/24  0535 01/20/24  0451 01/19/24  0442   WBC 10*3/mm3 8.10 10.00 7.40   < > 13.20*   HEMOGLOBIN g/dL 14.1 13.4 14.3   < > 16.2   HEMATOCRIT % 42.2 39.7 43.0   < > 48.1   PLATELETS 10*3/mm3 288 266 252   < > 201   MONOCYTES % %  --   --   --   --  9.0    < > = values in this interval not displayed.     Results from last 7 days   Lab Units 01/24/24  0401 01/23/24  0430 01/22/24  0924   SODIUM mmol/L 138 137 138   POTASSIUM mmol/L 3.8 3.5 3.7   CHLORIDE mmol/L 101 100 102   CO2 mmol/L 28.0 26.0 26.0   BUN mg/dL 10 10 12   CREATININE mg/dL 0.69* 0.61* 0.66*   CALCIUM mg/dL 9.2 9.3 9.6   BILIRUBIN mg/dL 0.3 0.5 0.7   ALK PHOS U/L 58 55 54   ALT (SGPT) U/L 48* 36 29   AST (SGOT) U/L 46* 34 22   GLUCOSE mg/dL 76 78 88     Results from last 7 days   Lab Units 01/24/24  0401 01/23/24  0430 01/22/24  1232   INR  1.25* 1.26* 1.25*     Assessment and Plan:  38 y.o. male postop day #2 status post robotic assisted laparoscopic subtotal cholecystectomy.  Doing well.    - Continue regular diet  - As needed pain control  - Continue TAL drain  - Continue antibiotics  - From a surgical standpoint okay to resume warfarin  - From a surgical standpoint patient is okay to discharge home with TAL drain in place and follow-up in clinic next week for drain removal    This note was created using Dragon Voice Recognition software.    Chandrika Bran MD  01/24/24  10:24 EST

## 2024-01-24 NOTE — PROGRESS NOTES
Reason for follow-up: Aortic valve replacement, anticoagulation management     Patient Care Team:  Wally Henderson MD as PCP - General (Internal Medicine)    Subjective .   James Rodriguez is doing well today. Coumadin restarted.     Review of Systems   Constitutional: Negative for fever and malaise/fatigue.   Cardiovascular:  Negative for chest pain and palpitations.   Respiratory:  Negative for shortness of breath.    Neurological:  Negative for dizziness and light-headedness.   All other systems reviewed and are negative.      Latex    Scheduled Meds:atorvastatin, 10 mg, Oral, Nightly  enoxaparin, 1 mg/kg, Subcutaneous, Q12H  losartan, 100 mg, Oral, Daily  pantoprazole, 40 mg, Intravenous, BID AC  piperacillin-tazobactam, 3.375 g, Intravenous, Q8H  polyethylene glycol, 17 g, Oral, BID  senna-docusate sodium, 2 tablet, Oral, BID  sertraline, 200 mg, Oral, Daily  sodium chloride, 10 mL, Intravenous, Q12H  warfarin, 7.5 mg, Oral, Daily      Continuous Infusions:Pharmacy Consult - Pharmacy to dose,   sodium chloride, 125 mL/hr, Last Rate: Stopped (01/24/24 1417)      PRN Meds:.  acetaminophen **OR** acetaminophen **OR** acetaminophen    aluminum-magnesium hydroxide-simethicone    senna-docusate sodium **AND** polyethylene glycol **AND** bisacodyl **AND** bisacodyl    Calcium Replacement - Follow Nurse / BPA Driven Protocol    dicyclomine    HYDROcodone-acetaminophen    Magnesium Standard Dose Replacement - Follow Nurse / BPA Driven Protocol    metoclopramide    nitroglycerin    ondansetron    Pharmacy Consult - Pharmacy to dose    Phosphorus Replacement - Follow Nurse / BPA Driven Protocol    Potassium Replacement - Follow Nurse / BPA Driven Protocol    [COMPLETED] Insert Peripheral IV **AND** sodium chloride    sodium chloride    sodium chloride      VITAL SIGNS  Vitals:    01/24/24 0242 01/24/24 0619 01/24/24 0950 01/24/24 1300   BP: 118/72 123/75 141/77 106/65   BP Location: Left arm Left arm Left arm  "Left arm   Patient Position: Lying Lying Lying Lying   Pulse: 58  59 61   Resp: 17 17 17 17   Temp: 97.7 °F (36.5 °C) 98.1 °F (36.7 °C) 97.5 °F (36.4 °C)    TempSrc: Oral Oral Oral    SpO2: 95% 93%     Weight:       Height:           Flowsheet Rows      Flowsheet Row First Filed Value   Admission Height 190.5 cm (75\") Documented at 01/16/2024 0614   Admission Weight 77.1 kg (169 lb 15.6 oz) Documented at 01/16/2024 0614               Physical Exam  VITALS REVIEWED    General:      well developed, in no acute distress.    Head:      normocephalic and atraumatic.    Eyes:      PERRL/EOM intact, conjunctiva and sclera clear with out nystagmus.    Neck:      no masses, thyromegaly,  trachea central with normal respiratory effort and PMI displaced laterally  Lungs:      Clear  Heart:       Regular rate and rhythm  Msk:      no deformity or scoliosis noted of thoracic or lumbar spine.    Pulses:      pulses normal in all 4 extremities.    Extremities:       No lower extremity edema  Neurologic:      no focal deficits.   alert oriented x3  Skin:      intact without lesions or rashes.    Psych:      alert and cooperative; normal mood and affect; normal attention span and concentration.          LAB RESULTS (LAST 7 DAYS)    CBC  Results from last 7 days   Lab Units 01/24/24  0401 01/23/24  0430 01/22/24  0535 01/21/24  0408 01/20/24  0451 01/19/24  0442 01/18/24  0345   WBC 10*3/mm3 8.10 10.00 7.40 11.00* 9.50 13.20* 19.10*   RBC 10*6/mm3 4.69 4.50 4.81 5.64 5.18 5.41 5.63   HEMOGLOBIN g/dL 14.1 13.4 14.3 17.0 15.6 16.2 17.0   HEMATOCRIT % 42.2 39.7 43.0 49.9 45.4 48.1 50.2   MCV fL 89.9 88.4 89.3 88.4 87.7 88.9 89.2   PLATELETS 10*3/mm3 288 266 252 270 208 201 218       BMP  Results from last 7 days   Lab Units 01/24/24  0401 01/23/24  0430 01/22/24  0924 01/22/24  0526 01/21/24  0408 01/20/24  1621 01/20/24  0451 01/19/24  0442 01/18/24  0345   SODIUM mmol/L 138 137 138  --  136  --  136 135* 135*   POTASSIUM mmol/L 3.8 " 3.5 3.7  --  3.8 4.1 3.5 3.4* 3.6   CHLORIDE mmol/L 101 100 102  --  99  --  100 99 100   CO2 mmol/L 28.0 26.0 26.0  --  23.0  --  27.0 25.0 26.0   BUN mg/dL 10 10 12  --  9  --  13 14 8   CREATININE mg/dL 0.69* 0.61* 0.66*  --  0.59*  --  0.59* 0.61* 0.57*   GLUCOSE mg/dL 76 78 88  --  134*  --  90 95 109*   MAGNESIUM mg/dL 1.8 1.6 2.0  --  1.8  --  1.9 1.8  --    PHOSPHORUS mg/dL  --   --   --  3.3 2.8  --  2.8 2.2*  --        CMP   Results from last 7 days   Lab Units 01/24/24  0401 01/23/24  0430 01/22/24  0924 01/21/24  0408 01/20/24  1621 01/20/24  0451 01/19/24  0442 01/18/24  0345   SODIUM mmol/L 138 137 138 136  --  136 135* 135*   POTASSIUM mmol/L 3.8 3.5 3.7 3.8 4.1 3.5 3.4* 3.6   CHLORIDE mmol/L 101 100 102 99  --  100 99 100   CO2 mmol/L 28.0 26.0 26.0 23.0  --  27.0 25.0 26.0   BUN mg/dL 10 10 12 9  --  13 14 8   CREATININE mg/dL 0.69* 0.61* 0.66* 0.59*  --  0.59* 0.61* 0.57*   GLUCOSE mg/dL 76 78 88 134*  --  90 95 109*   ALBUMIN g/dL 3.7 3.5 3.7 4.0  --  3.7 3.9 4.3   BILIRUBIN mg/dL 0.3 0.5 0.7 0.9  --  0.8 1.0 1.1   ALK PHOS U/L 58 55 54 69  --  64 67 67   AST (SGOT) U/L 46* 34 22 27  --  25 23 33   ALT (SGPT) U/L 48* 36 29 34  --  29 30 36   LIPASE U/L  --   --   --   --   --   --   --  31         BNP        TROPONIN        CoAg  Results from last 7 days   Lab Units 01/24/24  0401 01/23/24  0430 01/22/24  1232 01/22/24  0526 01/21/24  0408 01/20/24  0451 01/19/24  0442   INR  1.25* 1.26* 1.25* 2.00 1.60* 1.98* 3.20*       Creatinine Clearance  Estimated Creatinine Clearance: 147.8 mL/min (A) (by C-G formula based on SCr of 0.69 mg/dL (L)).    ABG          EKG    I personally reviewed the patient's EKG/Telemetry data: Sinus rhythm      Assessment & Plan       Vomiting    Marfan's syndrome    Essential hypertension      James Rodriguez is a 38-year-old male patient who has Marfan syndrome, has a mechanical valve replacement in 1998, has been on chronic anticoagulation with warfarin, presented to the  hospital with abdominal pain and possible cholecystitis.  Anticipated surgery early next week.  Warfarin is discontinued, he is being bridged with full dose Lovenox twice daily.  Lovenox can be stopped the morning of the surgery and resumed along with warfarin as soon as possible.    1/23/2024  Patient underwent LAP cholecystectomy yesterday. Remains on lovenox. Will restart coumadin for mechanical aortic valve when ok with surgery. Continue current medical therapy.     1/24/2024  Patient doing well. Surgery has cleared for discharge. Restarted coumadin today with pharmacy to dose. Still bridging for INR 2-3 per records. Once therapeutic, ok to discharge from cardiology standpoint.    I discussed the patients findings and my recommendations with patient and agrees with outlined plan.    STEVE Garcia  01/24/24  14:40 EST  Electronically signed by STEVE Garcia, 01/24/24, 2:42 PM EST.

## 2024-01-24 NOTE — PLAN OF CARE
Goal Outcome Evaluation:  Plan of Care Reviewed With: patient        Progress: no change  Outcome Evaluation: Pt rested throughout the night with complaints of abdominal pain, po pain meds control pain well. Continues on IV abx and IVF's  incision sites intact TAL drain with minimal drainage documented  VSS will await further orders from MD

## 2024-01-25 ENCOUNTER — READMISSION MANAGEMENT (OUTPATIENT)
Dept: CALL CENTER | Facility: HOSPITAL | Age: 39
End: 2024-01-25
Payer: MEDICARE

## 2024-01-25 VITALS
RESPIRATION RATE: 15 BRPM | WEIGHT: 160.2 LBS | BODY MASS INDEX: 19.92 KG/M2 | HEIGHT: 75 IN | TEMPERATURE: 97.5 F | DIASTOLIC BLOOD PRESSURE: 80 MMHG | HEART RATE: 58 BPM | SYSTOLIC BLOOD PRESSURE: 119 MMHG | OXYGEN SATURATION: 95 %

## 2024-01-25 PROBLEM — R11.10 VOMITING: Status: RESOLVED | Noted: 2024-01-16 | Resolved: 2024-01-25

## 2024-01-25 LAB
ALBUMIN SERPL-MCNC: 3.6 G/DL (ref 3.5–5.2)
ALBUMIN/GLOB SERPL: 1.3 G/DL
ALP SERPL-CCNC: 62 U/L (ref 39–117)
ALT SERPL W P-5'-P-CCNC: 87 U/L (ref 1–41)
ANION GAP SERPL CALCULATED.3IONS-SCNC: 9 MMOL/L (ref 5–15)
AST SERPL-CCNC: 74 U/L (ref 1–40)
BILIRUB SERPL-MCNC: 0.2 MG/DL (ref 0–1.2)
BUN SERPL-MCNC: 11 MG/DL (ref 6–20)
BUN/CREAT SERPL: 18.3 (ref 7–25)
CALCIUM SPEC-SCNC: 9.4 MG/DL (ref 8.6–10.5)
CHLORIDE SERPL-SCNC: 104 MMOL/L (ref 98–107)
CO2 SERPL-SCNC: 25 MMOL/L (ref 22–29)
CREAT SERPL-MCNC: 0.6 MG/DL (ref 0.76–1.27)
EGFRCR SERPLBLD CKD-EPI 2021: 125.9 ML/MIN/1.73
GLOBULIN UR ELPH-MCNC: 2.8 GM/DL
GLUCOSE SERPL-MCNC: 111 MG/DL (ref 65–99)
INR PPP: 1.1 (ref 2–3)
LAB AP CASE REPORT: NORMAL
PATH REPORT.FINAL DX SPEC: NORMAL
PATH REPORT.GROSS SPEC: NORMAL
POTASSIUM SERPL-SCNC: 4 MMOL/L (ref 3.5–5.2)
PROT SERPL-MCNC: 6.4 G/DL (ref 6–8.5)
PROTHROMBIN TIME: 11.9 SECONDS (ref 19.4–28.5)
SODIUM SERPL-SCNC: 138 MMOL/L (ref 136–145)

## 2024-01-25 PROCEDURE — 80053 COMPREHEN METABOLIC PANEL: CPT | Performed by: STUDENT IN AN ORGANIZED HEALTH CARE EDUCATION/TRAINING PROGRAM

## 2024-01-25 PROCEDURE — 85610 PROTHROMBIN TIME: CPT | Performed by: SURGERY

## 2024-01-25 PROCEDURE — 25010000002 ENOXAPARIN PER 10 MG: Performed by: SURGERY

## 2024-01-25 PROCEDURE — 25810000003 SODIUM CHLORIDE 0.9 % SOLUTION: Performed by: SURGERY

## 2024-01-25 RX ORDER — WARFARIN SODIUM 1 MG/1
1 TABLET ORAL
Status: DISCONTINUED | OUTPATIENT
Start: 2024-01-25 | End: 2024-01-25 | Stop reason: HOSPADM

## 2024-01-25 RX ORDER — AMOXICILLIN AND CLAVULANATE POTASSIUM 875; 125 MG/1; MG/1
1 TABLET, FILM COATED ORAL EVERY 12 HOURS SCHEDULED
Qty: 3 TABLET | Refills: 0 | Status: SHIPPED | OUTPATIENT
Start: 2024-01-25 | End: 2024-01-25 | Stop reason: SDUPTHER

## 2024-01-25 RX ORDER — AMOXICILLIN AND CLAVULANATE POTASSIUM 875; 125 MG/1; MG/1
1 TABLET, FILM COATED ORAL EVERY 12 HOURS SCHEDULED
Qty: 10 TABLET | Refills: 0 | Status: SHIPPED | OUTPATIENT
Start: 2024-01-25 | End: 2024-01-30

## 2024-01-25 RX ORDER — FAMOTIDINE 20 MG/1
20 TABLET, FILM COATED ORAL
Status: DISCONTINUED | OUTPATIENT
Start: 2024-01-25 | End: 2024-02-02 | Stop reason: HOSPADM

## 2024-01-25 RX ORDER — ENOXAPARIN SODIUM 100 MG/ML
1 INJECTION SUBCUTANEOUS EVERY 12 HOURS
Qty: 9.8 ML | Refills: 0 | Status: SHIPPED | OUTPATIENT
Start: 2024-01-25 | End: 2024-02-05 | Stop reason: HOSPADM

## 2024-01-25 RX ADMIN — LOSARTAN POTASSIUM 100 MG: 50 TABLET, FILM COATED ORAL at 08:00

## 2024-01-25 RX ADMIN — PANTOPRAZOLE SODIUM 40 MG: 40 INJECTION, POWDER, LYOPHILIZED, FOR SOLUTION INTRAVENOUS at 08:00

## 2024-01-25 RX ADMIN — SODIUM CHLORIDE 125 ML/HR: 9 INJECTION, SOLUTION INTRAVENOUS at 08:00

## 2024-01-25 RX ADMIN — POLYETHYLENE GLYCOL 3350 17 G: 17 POWDER, FOR SOLUTION ORAL at 08:00

## 2024-01-25 RX ADMIN — AMOXICILLIN AND CLAVULANATE POTASSIUM 1 TABLET: 875; 125 TABLET, FILM COATED ORAL at 08:00

## 2024-01-25 RX ADMIN — ENOXAPARIN SODIUM 70 MG: 100 INJECTION SUBCUTANEOUS at 08:00

## 2024-01-25 NOTE — DISCHARGE SUMMARY
"[facility]  Discharge Summary    Date of Service: 24    Patient Name: James Rodriguez  : 1985  MRN: [id number]    Date of Admission: 2024  Discharge Diagnosis: Acute cholecystitis  Date of Discharge:  24    Primary Care Physician: Wally Henderson MD      Presenting Problem:   Vomiting [R11.10]  Nausea vomiting and diarrhea [R11.2, R19.7]  Abdominal pain, unspecified abdominal location [R10.9]  Leukocytosis, unspecified type [D72.829]    Active and Resolved Hospital Problems:  #Right upper quadrant abdominal pain  #s/p cholecystectomy-robotic assisted laparoscopic  #Acute cholecystitis  #Mechanical aortic valve  #On warfarin supratherapeutic INR  #marfan  syndrome  #Essential hypertension    Hospital Course     HPI:  Per the H&P   istory of Present Illness  ED 2024   Context: 38-year-old male past medical history significant for Marfan's on warfarin who presents with his girlfriend with complaints of abdominal pain/epigastric pain radiating into his back with nausea vomiting and diarrhea.  Was seen in urgent care on  and states his cough and congestion has improved.  Girlfriend has also had GI symptoms of nausea vomiting and diarrhea as well.  He denies any fever or urinary complaints.  Denies any associated diaphoresis chest heaviness or pressure.  States he gets his INR checked every 3 months.     Observation 2024  Patient agrees with HPI noted above including generalized abdominal pain, nausea, vomiting and diarrhea for the past 2 days.  He reports that girlfriend had similar symptoms 2 days prior.  He denies any food triggers or recent travel.  Reports abdominal pain 8/10.  He states that he is on Coumadin for an artificial valve replacement but he cannot recall which valve.    Hospital Course:  Patient was \"worked up for acute cholecystitis and imaging was suspicious underwent robotic assisted laparoscopy and was found to " have inflamed and partially necrotic gallbladder which was removed TAL drain placed. patient was followed by cardiology and hematology.  Patient was bridged with Lovenox for warfarin before the procedure and which was resumed moved.  Patient general condition improved and discharged with bridging Lovenox and continued warfarin with goal of INR 2-3 and home care with  the plan to do INR every other day         DISCHARGE Follow Up Recommendations for labs and diagnostics:   Surgical team for TAL drain follow-up and possible removal.  Home care for every other day INR and bridging with warfarin      Reasons For Change In Medications and Indications for New Medications:      Day of Discharge     Vital Signs:  Temp:  [97.5 °F (36.4 °C)-97.8 °F (36.6 °C)] 97.5 °F (36.4 °C)  Heart Rate:  [58-60] 58  Resp:  [15-18] 15  BP: (119-125)/(69-80) 119/80    Physical Exam:  Physical Exam   HENT:      Head: Atraumatic.      Mouth/Throat:      Mouth: Mucous membranes are moist.   Eyes:      Pupils: Pupils are equal, round, and reactive to light.   Cardiovascular:      Rate and Rhythm: Normal rate.      Heart sounds: Murmur heard.   Pulmonary:      Effort: Pulmonary effort is normal.      Breath sounds: Normal breath sounds.   Abdominal:      Palpations: Abdomen is soft.      Comments: TAL drain in place , clean surgical dressing    Musculoskeletal:         General: Normal range of motion.      Cervical back: Neck supple.   Skin:     General: Skin is warm.   Neurological:      General: No focal deficit present.      Mental Status: He is alert and oriented to person, place, and time.   Psychiatric:         Mood and Affect: Mood normal.         Behavior: Behavior normal.       Pertinent  and/or Most Recent Results     LAB RESULTS:      Lab 01/25/24  0419 01/24/24  0401 01/23/24  0430 01/22/24  1232 01/22/24  0535 01/22/24  0526 01/21/24  0408 01/20/24  0451   WBC  --  8.10 10.00  --  7.40  --  11.00* 9.50   HEMOGLOBIN  --  14.1 13.4  --   14.3  --  17.0 15.6   HEMATOCRIT  --  42.2 39.7  --  43.0  --  49.9 45.4   PLATELETS  --  288 266  --  252  --  270 208   NEUTROS ABS  --  5.00 7.30*  --  4.40  --  9.40* 6.70   LYMPHS ABS  --  2.00 1.70  --  1.90  --  0.80 1.60   MONOS ABS  --  0.80 0.90  --  0.80  --  0.70 0.80   EOS ABS  --  0.20 0.00  --  0.20  --  0.10 0.30   MCV  --  89.9 88.4  --  89.3  --  88.4 87.7   PROTIME 11.9* 13.4* 13.5* 13.4*  --  20.7 16.8* 20.5         [facility] 01/25/24  0419 01/24/24  0401 01/23/24  0430 01/22/24  0924 01/22/24  0526 01/21/24  0408 01/20/24  1621 01/20/24  0451 01/19/24  0442   SODIUM 138 138 137 138  --  136  --  136 135*   POTASSIUM 4.0 3.8 3.5 3.7  --  3.8   < > 3.5 3.4*   CHLORIDE 104 101 100 102  --  99  --  100 99   CO2 25.0 28.0 26.0 26.0  --  23.0  --  27.0 25.0   ANION GAP 9.0 9.0 11.0 10.0  --  14.0  --  9.0 11.0   BUN 11 10 10 12  --  9  --  13 14   CREATININE 0.60* 0.69* 0.61* 0.66*  --  0.59*  --  0.59* 0.61*   EGFR 125.9 120.7 125.3 122.4  --  126.6  --  126.6 125.3   GLUCOSE 111* 76 78 88  --  134*  --  90 95   CALCIUM 9.4 9.2 9.3 9.6  --  9.9  --  9.5 9.4   MAGNESIUM  --  1.8 1.6 2.0  --  1.8  --  1.9 1.8   PHOSPHORUS  --   --   --   --  3.3 2.8  --  2.8 2.2*    < > = values in this interval not displayed.         Lab 01/25/24  0419 01/24/24  0401 01/23/24  0430 01/22/24  0924 01/21/24  0408   TOTAL PROTEIN 6.4 6.7 6.2 6.7 7.5   ALBUMIN 3.6 3.7 3.5 3.7 4.0   GLOBULIN 2.8 3.0 2.7 3.0 3.5   ALT (SGPT) 87* 48* 36 29 34   AST (SGOT) 74* 46* 34 22 27   BILIRUBIN 0.2 0.3 0.5 0.7 0.9   ALK PHOS 62 58 55 54 69         Lab 01/25/24  0419 01/24/24  0401 01/23/24  0430 01/22/24  1232 01/22/24  0526   PROTIME 11.9* 13.4* 13.5* 13.4* 20.7   INR 1.10* 1.25* 1.26* 1.25* 2.00             Lab 01/22/24  0924   ABO TYPING A   RH TYPING Positive   ANTIBODY SCREEN Negative         Brief Urine Lab Results  (Last result in the past 365 days)        Color   Clarity   Blood   Leuk Est   Nitrite   Protein   CREAT   Urine HCG         01/16/24 0712 Yellow   Clear   Small (1+)   Negative   Negative   Trace                 Microbiology Results (last 10 days)       Procedure Component Value - Date/Time    Gastrointestinal Panel, PCR - Stool, Per Rectum [[id number]]  (Normal) Collected: 01/21/24 1711    Lab Status: Final result Specimen: Stool from Per Rectum Updated: 01/21/24 1836     Campylobacter Not Detected     Plesiomonas shigelloides Not Detected     Salmonella Not Detected     Vibrio Not Detected     Vibrio cholerae Not Detected     Yersinia enterocolitica Not Detected     Enteroaggregative E. coli (EAEC) Not Detected     Enteropathogenic E. coli (EPEC) Not Detected     Enterotoxigenic E. coli (ETEC) lt/st Not Detected     Shiga-like toxin-producing E. coli (STEC) stx1/stx2 Not Detected     Shigella/Enteroinvasive E. coli (EIEC) Not Detected     Cryptosporidium Not Detected     Cyclospora cayetanensis Not Detected     Entamoeba histolytica Not Detected     Giardia lamblia Not Detected     Adenovirus F40/41 Not Detected     Astrovirus Not Detected     Norovirus GI/GII Not Detected     Rotavirus A Not Detected     Sapovirus (I, II, IV or V) Not Detected    Narrative:      If Aeromonas, Staphylococcus aureus or Bacillus cereus are suspected, please order [id number]: Stool Culture, Aeromonas, S aureus, B Cereus.    Respiratory Panel PCR w/COVID-19(SARS-CoV-2) KATHERINE/GAUDENCIO/SARAH/PAD/COR/MICHAEL In-House, NP Swab in UTM/VTM, 2 HR TAT - Swab, Nasopharynx [[id number]]  (Normal) Collected: 01/21/24 1242    Lab Status: Final result Specimen: Swab from Nasopharynx Updated: 01/21/24 1341     ADENOVIRUS, PCR Not Detected     Coronavirus 229E Not Detected     Coronavirus HKU1 Not Detected     Coronavirus NL63 Not Detected     Coronavirus OC43 Not Detected     COVID19 Not Detected     Human Metapneumovirus Not Detected     Human Rhinovirus/Enterovirus Not Detected     Influenza A PCR Not Detected     Influenza B PCR Not Detected     Parainfluenza Virus 1 Not Detected      Parainfluenza Virus 2 Not Detected     Parainfluenza Virus 3 Not Detected     Parainfluenza Virus 4 Not Detected     RSV, PCR Not Detected     Bordetella pertussis pcr Not Detected     Bordetella parapertussis PCR Not Detected     Chlamydophila pneumoniae PCR Not Detected     Mycoplasma pneumo by PCR Not Detected    Narrative:      In the setting of a positive respiratory panel with a viral infection PLUS a negative procalcitonin without other underlying concern for bacterial infection, consider observing off antibiotics or discontinuation of antibiotics and continue supportive care. If the respiratory panel is positive for atypical bacterial infection (Bordetella pertussis, Chlamydophila pneumoniae, or Mycoplasma pneumoniae), consider antibiotic de-escalation to target atypical bacterial infection.    Gastrointestinal Panel, PCR - Stool, Per Rectum [[id number]]  (Normal) Collected: 01/17/24 1329    Lab Status: Final result Specimen: Stool from Per Rectum Updated: 01/17/24 1501     Campylobacter Not Detected     Plesiomonas shigelloides Not Detected     Salmonella Not Detected     Vibrio Not Detected     Vibrio cholerae Not Detected     Yersinia enterocolitica Not Detected     Enteroaggregative E. coli (EAEC) Not Detected     Enteropathogenic E. coli (EPEC) Not Detected     Enterotoxigenic E. coli (ETEC) lt/st Not Detected     Shiga-like toxin-producing E. coli (STEC) stx1/stx2 Not Detected     Shigella/Enteroinvasive E. coli (EIEC) Not Detected     Cryptosporidium Not Detected     Cyclospora cayetanensis Not Detected     Entamoeba histolytica Not Detected     Giardia lamblia Not Detected     Adenovirus F40/41 Not Detected     Astrovirus Not Detected     Norovirus GI/GII Not Detected     Rotavirus A Not Detected     Sapovirus (I, II, IV or V) Not Detected    Narrative:      If Aeromonas, Staphylococcus aureus or Bacillus cereus are suspected, please order [id number]: Stool Culture, Aeromonas, S aureus, B Cereus.     Respiratory Panel PCR w/COVID-19(SARS-CoV-2) KATHERINE/GAUDENCIO/SARAH/PAD/COR/MICHAEL In-House, NP Swab in UTM/VTM, 2 HR TAT - Swab, Nasopharynx [[id number]]  (Normal) Collected: 01/16/24 0713    Lab Status: Final result Specimen: Swab from Nasopharynx Updated: 01/16/24 0821     ADENOVIRUS, PCR Not Detected     Coronavirus 229E Not Detected     Coronavirus HKU1 Not Detected     Coronavirus NL63 Not Detected     Coronavirus OC43 Not Detected     COVID19 Not Detected     Human Metapneumovirus Not Detected     Human Rhinovirus/Enterovirus Not Detected     Influenza A PCR Not Detected     Influenza B PCR Not Detected     Parainfluenza Virus 1 Not Detected     Parainfluenza Virus 2 Not Detected     Parainfluenza Virus 3 Not Detected     Parainfluenza Virus 4 Not Detected     RSV, PCR Not Detected     Bordetella pertussis pcr Not Detected     Bordetella parapertussis PCR Not Detected     Chlamydophila pneumoniae PCR Not Detected     Mycoplasma pneumo by PCR Not Detected    Narrative:      In the setting of a positive respiratory panel with a viral infection PLUS a negative procalcitonin without other underlying concern for bacterial infection, consider observing off antibiotics or discontinuation of antibiotics and continue supportive care. If the respiratory panel is positive for atypical bacterial infection (Bordetella pertussis, Chlamydophila pneumoniae, or Mycoplasma pneumoniae), consider antibiotic de-escalation to target atypical bacterial infection.            US Liver    Result Date: 1/17/2024  Impression: Impression: Cholelithiasis with gallbladder sludge. There is gallbladder wall thickening with mild pericholecystic fluid. These findings raise the possibility of cholecystitis. Clinical and laboratory correlation are recommended. Electronically Signed: Cherrie Amato MD  1/17/2024 3:56 PM EST  Workstation ID: [id number]    CT Angiogram Abdomen Pelvis    Result Date: 1/16/2024  Impression: Impression: 1. No evidence of aortoiliac  "aneurysm or stenosis. 2. Shotty, somewhat numerous lymph nodes in the small bowel mesentery, which may be reactive, possibly viral mesenteric lymphadenitis. 3. Fecalization of distal small bowel, with no visible obstruction. Short segment of distal ileum with numerous diverticuli. Although this is somewhat unusual, there is no visible inflammatory change to suggest small bowel diverticulitis. Consider follow-up scan if patient symptoms persist or worsen. 4. Questionable gallbladder \"sludge\". No visible gallbladder wall inflammation or evidence of biliary ductal dilatation. 5. No evidence of potential inflammatory focus, bowel obstruction, obstructive uropathy, or other acute disease is seen. Electronically Signed: Cesar Bernal MD  1/16/2024 8:55 AM EST  Workstation ID: [id number]             Results for orders placed during the hospital encounter of 01/16/24    Adult Transthoracic Echo Complete W/ Cont if Necessary Per Protocol    Interpretation Summary    Left ventricular ejection fraction appears to be 61 - 65%.    Left ventricular diastolic function is consistent with (grade I) impaired relaxation.    There is a mechanical aortic valve prosthesis present,  Functioning appropriately with no significant stenosis or regurgitation    Estimated right ventricular systolic pressure from tricuspid regurgitation is normal (<35 mmHg).      Labs Pending at Discharge:  Pending Labs       Order Current Status    Tissue Pathology Exam In process            Procedures Performed  Procedure(s):  CHOLECYSTECTOMY LAPAROSCOPIC WITH DAVINCI ROBOT         Consults:   Consults       Date and Time Order Name Status Description    1/19/2024  8:18 AM Inpatient Cardiology Consult      1/18/2024  5:53 PM Hematology & Oncology Inpatient Consult Completed     1/18/2024  5:06 PM Inpatient Hospitalist Consult Completed     1/18/2024 12:41 PM Inpatient General Surgery Consult Completed     1/18/2024 12:38 PM Inpatient General Surgery Consult      " 1/17/2024  8:16 AM Inpatient Gastroenterology Consult Completed               Discharge Details        Discharge Medications        New Medications        Instructions Start Date   amoxicillin-clavulanate 875-125 MG per tablet  Commonly known as: AUGMENTIN   1 tablet, Oral, Every 12 Hours Scheduled      Enoxaparin Sodium 80 MG/0.8ML solution prefilled syringe syringe  Commonly known as: LOVENOX   1 mg/kg (70 mg), Subcutaneous, Every 12 Hours      HYDROcodone-acetaminophen 5-325 MG per tablet  Commonly known as: Norco   1 tablet, Oral, Every 6 Hours PRN             Continue These Medications        Instructions Start Date   atorvastatin 10 MG tablet  Commonly known as: LIPITOR   10 mg, Oral, Daily      losartan 100 MG tablet  Commonly known as: COZAAR   100 mg, Oral, Daily      sertraline 100 MG tablet  Commonly known as: ZOLOFT   200 mg, Oral, Daily      warfarin 7.5 MG tablet  Commonly known as: COUMADIN   7.5 mg, Oral, Nightly, Tuesday, Thursday, Saturday, Sunday               Allergies   Allergen Reactions    Latex Other (See Comments)     Primary care provider          Discharge Disposition:   Home-Health Care Svc    Diet:  Hospital:  Diet Order   Procedures    Diet: Regular/House Diet; Texture: Regular Texture (IDDSI 7); Fluid Consistency: Thin (IDDSI 0)         Discharge Activity:   as tolerated       CODE STATUS:  Code Status and Medical Interventions:   Ordered at: 01/16/24 1046     Level Of Support Discussed With:    Patient     Code Status (Patient has no pulse and is not breathing):    CPR (Attempt to Resuscitate)     Medical Interventions (Patient has pulse or is breathing):    Full Support         Future Appointments   Date Time Provider Department Center   1/29/2024  9:30 AM Chandrika Bran MD [facility]       Additional Instructions for the Follow-ups that You Need to Schedule       Ambulatory Referral to Home Health (Hospital)   As directed      Pt needs INR checked every other day and to  maintain 2-3  and TAL drain care    Order Comments: Pt needs INR checked every other day and to maintain 2-3  and TAL drain care    Face to Face Visit Date: 1/25/2024   Follow-up provider for Plan of Care?: I treated the patient in an acute care facility and will not continue treatment after discharge.   Follow-up provider: INDY KEENE [[id number]]   Reason/Clinical Findings: subtherapeutic  INR   Describe mobility limitations that make leaving home difficult: TAL drain   Nursing/Therapeutic Services Requested: Skilled Nursing   Skilled nursing orders: Other   Frequency: Other (3 times per week)                Time spent on Discharge including face to face service:  >30 minutes    Signature: Electronically signed by Abhijeet Morris MD, 01/25/24, 13:37 EST.  [facility] Hospitalist Team

## 2024-01-25 NOTE — CASE MANAGEMENT/SOCIAL WORK
Continued Stay Note  [facility]     Patient Name: James Rodriguez  MRN: [id number]  Today's Date: 1/25/2024    Admit Date: 1/16/2024    Plan: Return home with S.O. Referred to [facility]; acceptance pending   Discharge Plan       Row Name 01/25/24 1249       Plan    Plan Return home with S.O. Referred to [facility]; acceptance pending    Plan Comments Barriers: INR low and on Lovenox. TAL drain.  CM contacted Екатерина with [facility] and added to Epic for home health nursing for labwork and TAL drain. Acceptance pending                        Phone communication or documentation only - no physical contact with patient or family.   Ana COOPER,RN Case Manager  [facility]  Phone: Desk- [phone] cell- [phone]

## 2024-01-25 NOTE — PLAN OF CARE
Goal Outcome Evaluation:  Plan of Care Reviewed With: patient        Progress: no change  Outcome Evaluation: Pt rested throughout the night without complaints. Continues on IVF's and started PO antibiotics.If INR therapeutic pt may discharge home today. will await further orders from MD

## 2024-01-25 NOTE — PLAN OF CARE
Problem: Adult Inpatient Plan of Care  Goal: Plan of Care Review  Outcome: Ongoing, Progressing  Goal: Patient-Specific Goal (Individualized)  Outcome: Ongoing, Progressing  Goal: Absence of Hospital-Acquired Illness or Injury  Outcome: Ongoing, Progressing  Intervention: Identify and Manage Fall Risk  Recent Flowsheet Documentation  Taken 1/25/2024 1200 by Aleksey Chandler RN  Safety Promotion/Fall Prevention: safety round/check completed  Taken 1/25/2024 1000 by Aleksey Chandler RN  Safety Promotion/Fall Prevention: safety round/check completed  Taken 1/25/2024 0800 by Aleksey Chandler RN  Safety Promotion/Fall Prevention: safety round/check completed  Intervention: Prevent Skin Injury  Recent Flowsheet Documentation  Taken 1/25/2024 0800 by Aleksey Chandler RN  Body Position:   30 degrees   position changed independently  Intervention: Prevent and Manage VTE (Venous Thromboembolism) Risk  Recent Flowsheet Documentation  Taken 1/25/2024 0800 by Aleksey Chandler RN  Activity Management: up ad yann  Range of Motion: active ROM (range of motion) encouraged  Goal: Optimal Comfort and Wellbeing  Outcome: Ongoing, Progressing  Intervention: Provide Person-Centered Care  Recent Flowsheet Documentation  Taken 1/25/2024 0800 by Aleksey Chandler RN  Trust Relationship/Rapport: care explained  Goal: Readiness for Transition of Care  Outcome: Ongoing, Progressing   Goal Outcome Evaluation:   Pt improving awaiting discharge home

## 2024-01-25 NOTE — PROGRESS NOTES
"Pharmacy dosing service  Anticoagulant  Warfarin     Subjective:    James Rodriguez is a 38 y.o.male being continued on warfarin for aortic valve replacement.    INR Goal: 2 - 3  Home medication?: warfarin 7.5 mg PO daily  Bridge Therapy Present?:  Yes, Enoxaparin 70 mg SQ Q12H  Interacting Medications Evaluation (New/Present/Discontinued): pip/tazo (may increase INR)  Additional Contributing Factors:       Assessment/Plan:    Warfarin has been held for surgery. Okay to resume warfarin per Verónica Peoples NP with pharmacy to dose starting 1/24. Will schedule a total of warfarin 8.5 mg today and continue enoxaparin treatment dose until INR >2.     Continue to monitor and adjust based on INR.         Date 1/24 1/25          INR 1.25 1.10          Dose 7.5 mg 8.5 mg              Objective:  [Ht: 190.5 cm (75\"); Wt: 72.7 kg (160 lb 3.2 oz); BMI: Body mass index is 20.02 kg/m².]    Lab Results   Component Value Date    ALBUMIN 3.6 01/25/2024     Lab Results   Component Value Date    INR 1.10 (L) 01/25/2024    INR 1.25 (L) 01/24/2024    INR 1.26 (L) 01/23/2024    PROTIME 11.9 (L) 01/25/2024    PROTIME 13.4 (L) 01/24/2024    PROTIME 13.5 (L) 01/23/2024     Lab Results   Component Value Date    HGB 14.1 01/24/2024    HGB 13.4 01/23/2024    HGB 14.3 01/22/2024     Lab Results   Component Value Date    HCT 42.2 01/24/2024    HCT 39.7 01/23/2024    HCT 43.0 01/22/2024       Tamera Rey, PharmD  01/25/24 08:16 EST      "

## 2024-01-25 NOTE — DISCHARGE PLACEMENT REQUEST
"Sahwn Rodriguez (38 y.o. Male)       Date of Birth   1985    Social Security Number       Address   [address] [place] [address] [place] [address]    Home Phone   [phone]    MRN   [id number]       Nondenominational   None    Marital Status   Single                            Admission Date   1/16/24    Admission Type   Emergency    Admitting Provider   Reed Ceja MD    Attending Provider   Abhijeet Morris MD    Department, Room/Bed   [facility] OBSERVATION, 239/1       Discharge Date       Discharge Disposition       Discharge Destination                                 Attending Provider: Abhijeet Morris MD    Allergies: Latex    Isolation: None   Infection: None   Code Status: CPR    Ht: 190.5 cm (75\")   Wt: 72.7 kg (160 lb 3.2 oz)    Admission Cmt: None   Principal Problem: Vomiting [R11.10]                   Active Insurance as of 1/16/2024       Primary Coverage       Payor Plan Insurance Group Employer/Plan Group    MEDICARE MEDICARE A & B        Payor Plan Address Payor Plan Phone Number Payor Plan Fax Number Effective Dates    [address] [phone]  6/1/2007 - None Entered    [facility] [id number]         Subscriber Name Subscriber Birth Date Member ID       SHAWN RODRIGUEZ 1985 [id number]               Secondary Coverage       Payor Plan Insurance Group Employer/Plan Group    [place]        Payor Plan Address Payor Plan Phone Number Payor Plan Fax Number Effective Dates    [address]   7/1/2023 - None Entered    [place] [address]         Subscriber Name Subscriber Birth Date Member ID       SHAWN RODRIGUEZ 1985 [id number]                     Emergency Contacts        (Rel.) Home Phone Work Phone Mobile Phone    BALAJI GARCÍA (Significant Other) -- -- [phone]    LUIS CARVALHO (Relative) -- -- [phone]              Insurance Information                  MEDICARE/MEDICARE A & B Phone: [phone]    Subscriber: Jennifer" James Subscriber#: [id number]    Group#: -- Precert#: --        [place] Phone: --    Subscriber: James Rodriguez Subscriber#: [id number]    Group#: -- Precert#: --

## 2024-01-26 ENCOUNTER — TELEPHONE (OUTPATIENT)
Dept: SURGERY | Facility: CLINIC | Age: 39
End: 2024-01-26
Payer: MEDICARE

## 2024-01-26 NOTE — OUTREACH NOTE
Prep Survey      Flowsheet Row Responses   [facility] discharged [facility]? [facility]   Is LACE score < 7 ? No   Eligibility Readm Mgmt   Discharge diagnosis vomiting, abdominal pain, Lap stacie   Does the patient have one of the following disease processes/diagnoses(primary or secondary)? General Surgery   Does the patient have Home health ordered? Yes   What is the Home health agency?  HH ordered   Is there a DME ordered? No   Prep survey completed? Yes            Yesy AGUERO - Registered Nurse

## 2024-01-26 NOTE — TELEPHONE ENCOUNTER
James called back. He is doing fine after surgery, a little bit of pain. He can not take ibuprofen. I advised ice and informed of office visit.

## 2024-01-26 NOTE — TELEPHONE ENCOUNTER
Alea girlfriend returned the call to the office. No release on file for patient. I asked her to have the patient call the office when he is available

## 2024-01-26 NOTE — CASE MANAGEMENT/SOCIAL WORK
Case Management Discharge Note      Final Note: Home with [facility]. Declined by [facility] due to  inadequate staffing              Home Medical Care Coordination complete.      Service Provider Selected Services Address Phone Fax Patient Preferred    [facility] [address], [place] [address] [phone] --                      Transportation Services  Private: Car    Final Discharge Disposition Code: 06 - home with home health care

## 2024-01-26 NOTE — TELEPHONE ENCOUNTER
Call placed to patient to follow up after surgery, left message on machine advising calling to check on after surgery and to be sure aware of appt with us on Monday 1/29/24.

## 2024-01-29 ENCOUNTER — TELEPHONE (OUTPATIENT)
Dept: SURGERY | Facility: CLINIC | Age: 39
End: 2024-01-29

## 2024-01-29 ENCOUNTER — HOSPITAL ENCOUNTER (OUTPATIENT)
Dept: CT IMAGING | Facility: HOSPITAL | Age: 39
Discharge: HOME | End: 2024-01-29
Admitting: SURGERY
Payer: MEDICARE

## 2024-01-29 ENCOUNTER — OFFICE VISIT (OUTPATIENT)
Dept: SURGERY | Facility: CLINIC | Age: 39
End: 2024-01-29
Payer: MEDICARE

## 2024-01-29 VITALS
SYSTOLIC BLOOD PRESSURE: 130 MMHG | TEMPERATURE: 100 F | BODY MASS INDEX: 19.4 KG/M2 | WEIGHT: 156 LBS | HEART RATE: 64 BPM | DIASTOLIC BLOOD PRESSURE: 67 MMHG | HEIGHT: 75 IN | OXYGEN SATURATION: 98 %

## 2024-01-29 DIAGNOSIS — R50.82 POSTOPERATIVE FEVER: Primary | ICD-10-CM

## 2024-01-29 DIAGNOSIS — Z09 POSTOP CHECK: ICD-10-CM

## 2024-01-29 DIAGNOSIS — R50.82 POSTOPERATIVE FEVER: ICD-10-CM

## 2024-01-29 PROCEDURE — 74176 CT ABD & PELVIS W/O CONTRAST: CPT

## 2024-01-29 PROCEDURE — 3075F SYST BP GE 130 - 139MM HG: CPT | Performed by: SURGERY

## 2024-01-29 PROCEDURE — 1159F MED LIST DOCD IN RCRD: CPT | Performed by: SURGERY

## 2024-01-29 PROCEDURE — 99024 POSTOP FOLLOW-UP VISIT: CPT | Performed by: SURGERY

## 2024-01-29 PROCEDURE — 1160F RVW MEDS BY RX/DR IN RCRD: CPT | Performed by: SURGERY

## 2024-01-29 PROCEDURE — 3078F DIAST BP <80 MM HG: CPT | Performed by: SURGERY

## 2024-01-29 RX ORDER — ACETAMINOPHEN 500 MG
500 TABLET ORAL EVERY 4 HOURS PRN
COMMUNITY

## 2024-01-29 NOTE — PROGRESS NOTES
"General Surgery Post-Operative Follow Up Note     Subjective:  James Rodriguez is a 38 y.o. year old male here for post-operative follow up.  He reports he has pain at the drain site.  He is tolerating a diet without any nausea or vomiting but reports decreased p.o. intake.  He reports generally not feeling well and reports he has been having fevers at home.  Reports a fever of 101 degrees yesterday.  Low-grade fever today in the clinic.    Procedure:    Robotic assisted laparoscopic subtotal cholecystectomy with drain placement 1/22/2024    Pathology:    Gallbladder, cholecystectomy:  Chronic acalculous cholecystitis    Vitals:  /67 (BP Location: Left arm, Patient Position: Sitting, Cuff Size: Adult)   Pulse 64   Temp 100 °F (37.8 °C) (Infrared)   Ht 190.5 cm (75\")   Wt 70.8 kg (156 lb)   SpO2 98%   BMI 19.50 kg/m²      Physical Exam:   NAD, alert  Nonlabored respirations  Abdomen soft, NT/ND, no incisional hernias appreciated, incisions healing well  TAL drain in place with dark serosanguineous output, nonbilious    Assessment and Plan:  James Rodriguez is a 38 y.o. year old male status post robotic assisted laparoscopic subtotal cholecystectomy, with low-grade fevers postop.    -Pathology reviewed  -Will obtain CT abdomen/pelvis to evaluate abdomen given fevers  -Continue drain for now and will plan for follow-up next week for possible removal    Chandrika Bran M.D.  General Surgery  "

## 2024-01-29 NOTE — TELEPHONE ENCOUNTER
Per Dr. Bran, STAT CT abd pelvis done today shows a fluid collection at the area where drain is placed otherwise normal CT scan; okay to give results to patient. Patient to follow up here next week.    Call placed to patient, advised as per Dr. Bran. Patient expressed understanding of all discussed.

## 2024-01-30 ENCOUNTER — READMISSION MANAGEMENT (OUTPATIENT)
Dept: CALL CENTER | Facility: HOSPITAL | Age: 39
End: 2024-01-30
Payer: MEDICARE

## 2024-01-30 NOTE — OUTREACH NOTE
General Surgery Week 1 Survey      Flowsheet Row Responses   [facility] patient discharged from? [facility]   Does the patient have one of the following disease processes/diagnoses(primary or secondary)? General Surgery   Week 1 attempt successful? Yes   Call start time 1042   Call end time 1046   Discharge diagnosis vomiting, abdominal pain, Lap stacie   Meds reviewed with patient/caregiver? Yes   Is the patient having any side effects they believe may be caused by any medication additions or changes? No   Does the patient have all medications related to this admission filled (includes all antibiotics, pain medications, etc.) Yes   Is the patient taking all medications as directed (includes completed medication regime)? Yes   Does the patient have a follow up appointment scheduled with their surgeon? Yes   Has the patient kept scheduled appointments due by today? Yes   Comments Saw surgeon.   What is the Home health agency?  HH ordered   Has home health visited the patient within 72 hours of discharge? Yes   Psychosocial issues? No   Did the patient receive a copy of their discharge instructions? Yes   Nursing interventions Reviewed instructions with patient   What is the patient's perception of their health status since discharge? Improving   Nursing interventions Nurse provided patient education   Is the patient /caregiver able to teach back basic post-op care? Practice 'cough and deep breath', Continue use of incentive spirometry at least 1 week post discharge, No tub bath, swimming, or hot tub until instructed by MD, Lifting as instructed by MD in discharge instructions, Keep incision areas clean,dry and protected   Is the patient/caregiver able to teach back signs and symptoms of incisional infection? Increased redness, swelling or pain at the incisonal site, Increased drainage or bleeding, Incisional warmth, Pus or odor from incision, Fever   Is the patient/caregiver able to teach back steps to recovery at  home? Set small, achievable goals for return to baseline health, Rest and rebuild strength, gradually increase activity, Eat a well-balance diet   If the patient is a current smoker, are they able to teach back resources for cessation? Not a smoker   Is the patient/caregiver able to teach back the hierarchy of who to call/visit for symptoms/problems? PCP, Specialist, Home health nurse, Urgent Care, ED, 911 Yes   Additional teach back comments Still has drain and surgeon wanted to keep in place due to still draining.  States he is going next week to hopefully have removed.  Still sore but doing well.   Week 1 call completed? Yes   Graduated Yes   Graduated/Revoked comments Denies questions or needs at this time.   Call end time 1046            Kelli PAYAN - Licensed Nurse

## 2024-02-01 ENCOUNTER — HOSPITAL ENCOUNTER (INPATIENT)
Facility: HOSPITAL | Age: 39
LOS: 4 days | Discharge: HOME HEALTH | DRG: 394 | End: 2024-02-05
Attending: EMERGENCY MEDICINE | Admitting: INTERNAL MEDICINE
Payer: MEDICARE

## 2024-02-01 ENCOUNTER — APPOINTMENT (OUTPATIENT)
Dept: CT IMAGING | Facility: HOSPITAL | Age: 39
DRG: 394 | End: 2024-02-01
Payer: MEDICARE

## 2024-02-01 ENCOUNTER — APPOINTMENT (OUTPATIENT)
Dept: NUCLEAR MEDICINE | Facility: HOSPITAL | Age: 39
DRG: 394 | End: 2024-02-01
Payer: MEDICARE

## 2024-02-01 DIAGNOSIS — K83.9 BILE LEAK: ICD-10-CM

## 2024-02-01 DIAGNOSIS — R10.84 GENERALIZED ABDOMINAL PAIN: Primary | ICD-10-CM

## 2024-02-01 DIAGNOSIS — R53.1 WEAKNESS: ICD-10-CM

## 2024-02-01 PROBLEM — R10.9 ABDOMINAL PAIN: Status: ACTIVE | Noted: 2024-02-01

## 2024-02-01 LAB
ALBUMIN SERPL-MCNC: 3.8 G/DL (ref 3.5–5.2)
ALBUMIN SERPL-MCNC: 4.1 G/DL (ref 3.5–5.2)
ALBUMIN/GLOB SERPL: 1.4 G/DL
ALBUMIN/GLOB SERPL: 1.5 G/DL
ALP SERPL-CCNC: 77 U/L (ref 39–117)
ALP SERPL-CCNC: 83 U/L (ref 39–117)
ALT SERPL W P-5'-P-CCNC: 74 U/L (ref 1–41)
ALT SERPL W P-5'-P-CCNC: 79 U/L (ref 1–41)
AMPHET+METHAMPHET UR QL: NEGATIVE
ANION GAP SERPL CALCULATED.3IONS-SCNC: 10 MMOL/L (ref 5–15)
ANION GAP SERPL CALCULATED.3IONS-SCNC: 7 MMOL/L (ref 5–15)
AST SERPL-CCNC: 34 U/L (ref 1–40)
AST SERPL-CCNC: 45 U/L (ref 1–40)
B PARAPERT DNA SPEC QL NAA+PROBE: NOT DETECTED
B PERT DNA SPEC QL NAA+PROBE: NOT DETECTED
BACTERIA UR QL AUTO: NORMAL /HPF
BARBITURATES UR QL SCN: NEGATIVE
BASOPHILS # BLD AUTO: 0 10*3/MM3 (ref 0–0.2)
BASOPHILS # BLD AUTO: 0 10*3/MM3 (ref 0–0.2)
BASOPHILS NFR BLD AUTO: 0.4 % (ref 0–1.5)
BASOPHILS NFR BLD AUTO: 0.7 % (ref 0–1.5)
BENZODIAZ UR QL SCN: NEGATIVE
BILIRUB SERPL-MCNC: 0.3 MG/DL (ref 0–1.2)
BILIRUB SERPL-MCNC: 0.4 MG/DL (ref 0–1.2)
BILIRUB UR QL STRIP: NEGATIVE
BUN SERPL-MCNC: 12 MG/DL (ref 6–20)
BUN SERPL-MCNC: 9 MG/DL (ref 6–20)
BUN/CREAT SERPL: 14.1 (ref 7–25)
BUN/CREAT SERPL: 20.7 (ref 7–25)
C PNEUM DNA NPH QL NAA+NON-PROBE: NOT DETECTED
CALCIUM SPEC-SCNC: 8.7 MG/DL (ref 8.6–10.5)
CALCIUM SPEC-SCNC: 9.3 MG/DL (ref 8.6–10.5)
CANNABINOIDS SERPL QL: POSITIVE
CHLORIDE SERPL-SCNC: 100 MMOL/L (ref 98–107)
CHLORIDE SERPL-SCNC: 101 MMOL/L (ref 98–107)
CLARITY UR: CLEAR
CO2 SERPL-SCNC: 27 MMOL/L (ref 22–29)
CO2 SERPL-SCNC: 27 MMOL/L (ref 22–29)
COCAINE UR QL: NEGATIVE
COLOR UR: YELLOW
CREAT SERPL-MCNC: 0.58 MG/DL (ref 0.76–1.27)
CREAT SERPL-MCNC: 0.64 MG/DL (ref 0.76–1.27)
DEPRECATED RDW RBC AUTO: 41.6 FL (ref 37–54)
DEPRECATED RDW RBC AUTO: 42.9 FL (ref 37–54)
EGFRCR SERPLBLD CKD-EPI 2021: 123.5 ML/MIN/1.73
EGFRCR SERPLBLD CKD-EPI 2021: 127.2 ML/MIN/1.73
EOSINOPHIL # BLD AUTO: 0 10*3/MM3 (ref 0–0.4)
EOSINOPHIL # BLD AUTO: 0.1 10*3/MM3 (ref 0–0.4)
EOSINOPHIL NFR BLD AUTO: 0.2 % (ref 0.3–6.2)
EOSINOPHIL NFR BLD AUTO: 1.5 % (ref 0.3–6.2)
ERYTHROCYTE [DISTWIDTH] IN BLOOD BY AUTOMATED COUNT: 13.6 % (ref 12.3–15.4)
ERYTHROCYTE [DISTWIDTH] IN BLOOD BY AUTOMATED COUNT: 13.8 % (ref 12.3–15.4)
FLUAV H1 2009 PAND RNA NPH QL NAA+PROBE: DETECTED
FLUBV RNA ISLT QL NAA+PROBE: NOT DETECTED
GLOBULIN UR ELPH-MCNC: 2.7 GM/DL
GLOBULIN UR ELPH-MCNC: 2.8 GM/DL
GLUCOSE SERPL-MCNC: 105 MG/DL (ref 65–99)
GLUCOSE SERPL-MCNC: 149 MG/DL (ref 65–99)
GLUCOSE UR STRIP-MCNC: NEGATIVE MG/DL
HADV DNA SPEC NAA+PROBE: NOT DETECTED
HCOV 229E RNA SPEC QL NAA+PROBE: NOT DETECTED
HCOV HKU1 RNA SPEC QL NAA+PROBE: NOT DETECTED
HCOV NL63 RNA SPEC QL NAA+PROBE: NOT DETECTED
HCOV OC43 RNA SPEC QL NAA+PROBE: NOT DETECTED
HCT VFR BLD AUTO: 38.8 % (ref 37.5–51)
HCT VFR BLD AUTO: 42.9 % (ref 37.5–51)
HGB BLD-MCNC: 12.9 G/DL (ref 13–17.7)
HGB BLD-MCNC: 14.6 G/DL (ref 13–17.7)
HGB UR QL STRIP.AUTO: NEGATIVE
HMPV RNA NPH QL NAA+NON-PROBE: NOT DETECTED
HPIV1 RNA ISLT QL NAA+PROBE: NOT DETECTED
HPIV2 RNA SPEC QL NAA+PROBE: NOT DETECTED
HPIV3 RNA NPH QL NAA+PROBE: NOT DETECTED
HPIV4 P GENE NPH QL NAA+PROBE: NOT DETECTED
HYALINE CASTS UR QL AUTO: NORMAL /LPF
INR PPP: 2.4 (ref 2–3)
INR PPP: 2.43 (ref 2–3)
KETONES UR QL STRIP: NEGATIVE
LEUKOCYTE ESTERASE UR QL STRIP.AUTO: NEGATIVE
LIPASE SERPL-CCNC: 23 U/L (ref 13–60)
LYMPHOCYTES # BLD AUTO: 0.6 10*3/MM3 (ref 0.7–3.1)
LYMPHOCYTES # BLD AUTO: 1.3 10*3/MM3 (ref 0.7–3.1)
LYMPHOCYTES NFR BLD AUTO: 27.4 % (ref 19.6–45.3)
LYMPHOCYTES NFR BLD AUTO: 5.8 % (ref 19.6–45.3)
M PNEUMO IGG SER IA-ACNC: NOT DETECTED
MCH RBC QN AUTO: 29.4 PG (ref 26.6–33)
MCH RBC QN AUTO: 30.2 PG (ref 26.6–33)
MCHC RBC AUTO-ENTMCNC: 33.1 G/DL (ref 31.5–35.7)
MCHC RBC AUTO-ENTMCNC: 34.1 G/DL (ref 31.5–35.7)
MCV RBC AUTO: 88.5 FL (ref 79–97)
MCV RBC AUTO: 88.6 FL (ref 79–97)
METHADONE UR QL SCN: NEGATIVE
MONOCYTES # BLD AUTO: 0.5 10*3/MM3 (ref 0.1–0.9)
MONOCYTES # BLD AUTO: 0.6 10*3/MM3 (ref 0.1–0.9)
MONOCYTES NFR BLD AUTO: 10.8 % (ref 5–12)
MONOCYTES NFR BLD AUTO: 6 % (ref 5–12)
NEUTROPHILS NFR BLD AUTO: 2.8 10*3/MM3 (ref 1.7–7)
NEUTROPHILS NFR BLD AUTO: 59.6 % (ref 42.7–76)
NEUTROPHILS NFR BLD AUTO: 8.5 10*3/MM3 (ref 1.7–7)
NEUTROPHILS NFR BLD AUTO: 87.6 % (ref 42.7–76)
NITRITE UR QL STRIP: NEGATIVE
NRBC BLD AUTO-RTO: 0 /100 WBC (ref 0–0.2)
NRBC BLD AUTO-RTO: 0.1 /100 WBC (ref 0–0.2)
OPIATES UR QL: NEGATIVE
OXYCODONE UR QL SCN: NEGATIVE
PH UR STRIP.AUTO: 7 [PH] (ref 5–8)
PLATELET # BLD AUTO: 266 10*3/MM3 (ref 140–450)
PLATELET # BLD AUTO: 290 10*3/MM3 (ref 140–450)
PMV BLD AUTO: 8.9 FL (ref 6–12)
PMV BLD AUTO: 8.9 FL (ref 6–12)
POTASSIUM SERPL-SCNC: 3.3 MMOL/L (ref 3.5–5.2)
POTASSIUM SERPL-SCNC: 4 MMOL/L (ref 3.5–5.2)
PROT SERPL-MCNC: 6.5 G/DL (ref 6–8.5)
PROT SERPL-MCNC: 6.9 G/DL (ref 6–8.5)
PROT UR QL STRIP: ABNORMAL
PROTHROMBIN TIME: 24.5 SECONDS (ref 19.4–28.5)
PROTHROMBIN TIME: 24.8 SECONDS (ref 19.4–28.5)
RBC # BLD AUTO: 4.38 10*6/MM3 (ref 4.14–5.8)
RBC # BLD AUTO: 4.85 10*6/MM3 (ref 4.14–5.8)
RBC # UR STRIP: NORMAL /HPF
REF LAB TEST METHOD: NORMAL
RHINOVIRUS RNA SPEC NAA+PROBE: NOT DETECTED
RSV RNA NPH QL NAA+NON-PROBE: NOT DETECTED
SARS-COV-2 RNA NPH QL NAA+NON-PROBE: NOT DETECTED
SODIUM SERPL-SCNC: 135 MMOL/L (ref 136–145)
SODIUM SERPL-SCNC: 137 MMOL/L (ref 136–145)
SP GR UR STRIP: 1.02 (ref 1–1.03)
SQUAMOUS #/AREA URNS HPF: NORMAL /HPF
UROBILINOGEN UR QL STRIP: ABNORMAL
WBC # UR STRIP: NORMAL /HPF
WBC NRBC COR # BLD AUTO: 4.7 10*3/MM3 (ref 3.4–10.8)
WBC NRBC COR # BLD AUTO: 9.7 10*3/MM3 (ref 3.4–10.8)

## 2024-02-01 PROCEDURE — 78226 HEPATOBILIARY SYSTEM IMAGING: CPT

## 2024-02-01 PROCEDURE — 25010000002 PIPERACILLIN SOD-TAZOBACTAM PER 1 G: Performed by: INTERNAL MEDICINE

## 2024-02-01 PROCEDURE — 80307 DRUG TEST PRSMV CHEM ANLYZR: CPT | Performed by: NURSE PRACTITIONER

## 2024-02-01 PROCEDURE — 80053 COMPREHEN METABOLIC PANEL: CPT | Performed by: NURSE PRACTITIONER

## 2024-02-01 PROCEDURE — 0 TECHNETIUM TC 99M MEBROFENIN KIT: Performed by: EMERGENCY MEDICINE

## 2024-02-01 PROCEDURE — 25810000003 SODIUM CHLORIDE 0.9 % SOLUTION: Performed by: INTERNAL MEDICINE

## 2024-02-01 PROCEDURE — 83690 ASSAY OF LIPASE: CPT | Performed by: NURSE PRACTITIONER

## 2024-02-01 PROCEDURE — 25810000003 SODIUM CHLORIDE 0.9 % SOLUTION: Performed by: NURSE PRACTITIONER

## 2024-02-01 PROCEDURE — 80053 COMPREHEN METABOLIC PANEL: CPT | Performed by: INTERNAL MEDICINE

## 2024-02-01 PROCEDURE — 85025 COMPLETE CBC W/AUTO DIFF WBC: CPT | Performed by: NURSE PRACTITIONER

## 2024-02-01 PROCEDURE — 74177 CT ABD & PELVIS W/CONTRAST: CPT

## 2024-02-01 PROCEDURE — 85610 PROTHROMBIN TIME: CPT | Performed by: NURSE PRACTITIONER

## 2024-02-01 PROCEDURE — 25510000001 IOPAMIDOL PER 1 ML: Performed by: NURSE PRACTITIONER

## 2024-02-01 PROCEDURE — 99285 EMERGENCY DEPT VISIT HI MDM: CPT

## 2024-02-01 PROCEDURE — 0202U NFCT DS 22 TRGT SARS-COV-2: CPT | Performed by: INTERNAL MEDICINE

## 2024-02-01 PROCEDURE — 36415 COLL VENOUS BLD VENIPUNCTURE: CPT | Performed by: NURSE PRACTITIONER

## 2024-02-01 PROCEDURE — A9537 TC99M MEBROFENIN: HCPCS | Performed by: EMERGENCY MEDICINE

## 2024-02-01 PROCEDURE — 81001 URINALYSIS AUTO W/SCOPE: CPT | Performed by: NURSE PRACTITIONER

## 2024-02-01 PROCEDURE — 87040 BLOOD CULTURE FOR BACTERIA: CPT | Performed by: NURSE PRACTITIONER

## 2024-02-01 RX ORDER — ONDANSETRON 4 MG/1
4 TABLET, ORALLY DISINTEGRATING ORAL EVERY 6 HOURS PRN
Status: DISCONTINUED | OUTPATIENT
Start: 2024-02-01 | End: 2024-02-05 | Stop reason: HOSPADM

## 2024-02-01 RX ORDER — SODIUM CHLORIDE 9 MG/ML
100 INJECTION, SOLUTION INTRAVENOUS CONTINUOUS
Status: DISCONTINUED | OUTPATIENT
Start: 2024-02-01 | End: 2024-02-05 | Stop reason: HOSPADM

## 2024-02-01 RX ORDER — AMOXICILLIN 250 MG
2 CAPSULE ORAL 2 TIMES DAILY
Status: DISCONTINUED | OUTPATIENT
Start: 2024-02-01 | End: 2024-02-01

## 2024-02-01 RX ORDER — ACETAMINOPHEN 650 MG/1
650 SUPPOSITORY RECTAL EVERY 4 HOURS PRN
Status: DISCONTINUED | OUTPATIENT
Start: 2024-02-01 | End: 2024-02-05 | Stop reason: HOSPADM

## 2024-02-01 RX ORDER — SODIUM CHLORIDE 0.9 % (FLUSH) 0.9 %
10 SYRINGE (ML) INJECTION AS NEEDED
Status: DISCONTINUED | OUTPATIENT
Start: 2024-02-01 | End: 2024-02-01

## 2024-02-01 RX ORDER — BISACODYL 5 MG/1
5 TABLET, DELAYED RELEASE ORAL DAILY PRN
Status: DISCONTINUED | OUTPATIENT
Start: 2024-02-01 | End: 2024-02-05 | Stop reason: HOSPADM

## 2024-02-01 RX ORDER — SODIUM CHLORIDE 0.9 % (FLUSH) 0.9 %
10 SYRINGE (ML) INJECTION EVERY 12 HOURS SCHEDULED
Status: DISCONTINUED | OUTPATIENT
Start: 2024-02-01 | End: 2024-02-05 | Stop reason: HOSPADM

## 2024-02-01 RX ORDER — BISACODYL 5 MG/1
5 TABLET, DELAYED RELEASE ORAL DAILY PRN
Status: DISCONTINUED | OUTPATIENT
Start: 2024-02-01 | End: 2024-02-01

## 2024-02-01 RX ORDER — ACETAMINOPHEN 160 MG/5ML
650 SOLUTION ORAL EVERY 4 HOURS PRN
Status: DISCONTINUED | OUTPATIENT
Start: 2024-02-01 | End: 2024-02-01

## 2024-02-01 RX ORDER — MORPHINE SULFATE 2 MG/ML
2 INJECTION, SOLUTION INTRAMUSCULAR; INTRAVENOUS ONCE
Status: DISCONTINUED | OUTPATIENT
Start: 2024-02-01 | End: 2024-02-01

## 2024-02-01 RX ORDER — SERTRALINE HYDROCHLORIDE 100 MG/1
200 TABLET, FILM COATED ORAL DAILY
Status: DISCONTINUED | OUTPATIENT
Start: 2024-02-02 | End: 2024-02-05 | Stop reason: HOSPADM

## 2024-02-01 RX ORDER — AMOXICILLIN 250 MG
2 CAPSULE ORAL 2 TIMES DAILY
Status: DISCONTINUED | OUTPATIENT
Start: 2024-02-01 | End: 2024-02-05 | Stop reason: HOSPADM

## 2024-02-01 RX ORDER — CHOLECALCIFEROL (VITAMIN D3) 125 MCG
5 CAPSULE ORAL NIGHTLY PRN
Status: DISCONTINUED | OUTPATIENT
Start: 2024-02-01 | End: 2024-02-01

## 2024-02-01 RX ORDER — ATORVASTATIN CALCIUM 10 MG/1
10 TABLET, FILM COATED ORAL DAILY
Status: DISCONTINUED | OUTPATIENT
Start: 2024-02-02 | End: 2024-02-05 | Stop reason: HOSPADM

## 2024-02-01 RX ORDER — ACETAMINOPHEN 325 MG/1
650 TABLET ORAL EVERY 4 HOURS PRN
Status: DISCONTINUED | OUTPATIENT
Start: 2024-02-01 | End: 2024-02-01

## 2024-02-01 RX ORDER — ONDANSETRON 2 MG/ML
4 INJECTION INTRAMUSCULAR; INTRAVENOUS ONCE
Status: DISCONTINUED | OUTPATIENT
Start: 2024-02-01 | End: 2024-02-01

## 2024-02-01 RX ORDER — POLYETHYLENE GLYCOL 3350 17 G/17G
17 POWDER, FOR SOLUTION ORAL DAILY PRN
Status: DISCONTINUED | OUTPATIENT
Start: 2024-02-01 | End: 2024-02-01

## 2024-02-01 RX ORDER — ONDANSETRON 2 MG/ML
4 INJECTION INTRAMUSCULAR; INTRAVENOUS EVERY 6 HOURS PRN
Status: DISCONTINUED | OUTPATIENT
Start: 2024-02-01 | End: 2024-02-05 | Stop reason: HOSPADM

## 2024-02-01 RX ORDER — KIT FOR THE PREPARATION OF TECHNETIUM TC 99M MEBROFENIN 45 MG/10ML
1 INJECTION, POWDER, LYOPHILIZED, FOR SOLUTION INTRAVENOUS
Status: COMPLETED | OUTPATIENT
Start: 2024-02-01 | End: 2024-02-01

## 2024-02-01 RX ORDER — POLYETHYLENE GLYCOL 3350 17 G/17G
17 POWDER, FOR SOLUTION ORAL DAILY PRN
Status: DISCONTINUED | OUTPATIENT
Start: 2024-02-01 | End: 2024-02-05 | Stop reason: HOSPADM

## 2024-02-01 RX ORDER — ACETAMINOPHEN 325 MG/1
650 TABLET ORAL EVERY 4 HOURS PRN
Status: DISCONTINUED | OUTPATIENT
Start: 2024-02-01 | End: 2024-02-05 | Stop reason: HOSPADM

## 2024-02-01 RX ORDER — SODIUM CHLORIDE 9 MG/ML
40 INJECTION, SOLUTION INTRAVENOUS AS NEEDED
Status: DISCONTINUED | OUTPATIENT
Start: 2024-02-01 | End: 2024-02-05 | Stop reason: HOSPADM

## 2024-02-01 RX ORDER — SODIUM CHLORIDE 9 MG/ML
40 INJECTION, SOLUTION INTRAVENOUS AS NEEDED
Status: DISCONTINUED | OUTPATIENT
Start: 2024-02-01 | End: 2024-02-01

## 2024-02-01 RX ORDER — ONDANSETRON 2 MG/ML
4 INJECTION INTRAMUSCULAR; INTRAVENOUS EVERY 6 HOURS PRN
Status: DISCONTINUED | OUTPATIENT
Start: 2024-02-01 | End: 2024-02-01

## 2024-02-01 RX ORDER — BISACODYL 10 MG
10 SUPPOSITORY, RECTAL RECTAL DAILY PRN
Status: DISCONTINUED | OUTPATIENT
Start: 2024-02-01 | End: 2024-02-05 | Stop reason: HOSPADM

## 2024-02-01 RX ORDER — ACETAMINOPHEN 650 MG/1
650 SUPPOSITORY RECTAL EVERY 4 HOURS PRN
Status: DISCONTINUED | OUTPATIENT
Start: 2024-02-01 | End: 2024-02-01

## 2024-02-01 RX ORDER — ALUMINA, MAGNESIA, AND SIMETHICONE 2400; 2400; 240 MG/30ML; MG/30ML; MG/30ML
15 SUSPENSION ORAL EVERY 6 HOURS PRN
Status: DISCONTINUED | OUTPATIENT
Start: 2024-02-01 | End: 2024-02-01

## 2024-02-01 RX ORDER — FAMOTIDINE 20 MG/1
20 TABLET, FILM COATED ORAL
Status: DISCONTINUED | OUTPATIENT
Start: 2024-02-01 | End: 2024-02-05 | Stop reason: HOSPADM

## 2024-02-01 RX ORDER — ONDANSETRON 4 MG/1
4 TABLET, ORALLY DISINTEGRATING ORAL EVERY 6 HOURS PRN
Status: DISCONTINUED | OUTPATIENT
Start: 2024-02-01 | End: 2024-02-01

## 2024-02-01 RX ORDER — SODIUM CHLORIDE 0.9 % (FLUSH) 0.9 %
10 SYRINGE (ML) INJECTION AS NEEDED
Status: DISCONTINUED | OUTPATIENT
Start: 2024-02-01 | End: 2024-02-05 | Stop reason: HOSPADM

## 2024-02-01 RX ORDER — FAMOTIDINE 20 MG/1
20 TABLET, FILM COATED ORAL
Status: DISCONTINUED | OUTPATIENT
Start: 2024-02-01 | End: 2024-02-01

## 2024-02-01 RX ORDER — ACETAMINOPHEN 160 MG/5ML
650 SOLUTION ORAL EVERY 4 HOURS PRN
Status: DISCONTINUED | OUTPATIENT
Start: 2024-02-01 | End: 2024-02-05 | Stop reason: HOSPADM

## 2024-02-01 RX ORDER — BISACODYL 10 MG
10 SUPPOSITORY, RECTAL RECTAL DAILY PRN
Status: DISCONTINUED | OUTPATIENT
Start: 2024-02-01 | End: 2024-02-01

## 2024-02-01 RX ORDER — LOSARTAN POTASSIUM 50 MG/1
100 TABLET ORAL DAILY
Status: DISCONTINUED | OUTPATIENT
Start: 2024-02-02 | End: 2024-02-05 | Stop reason: HOSPADM

## 2024-02-01 RX ORDER — SODIUM CHLORIDE 0.9 % (FLUSH) 0.9 %
10 SYRINGE (ML) INJECTION EVERY 12 HOURS SCHEDULED
Status: DISCONTINUED | OUTPATIENT
Start: 2024-02-01 | End: 2024-02-01

## 2024-02-01 RX ADMIN — PIPERACILLIN AND TAZOBACTAM 3.38 G: 3; .375 INJECTION, POWDER, FOR SOLUTION INTRAVENOUS at 22:28

## 2024-02-01 RX ADMIN — SODIUM CHLORIDE 100 ML/HR: 9 INJECTION, SOLUTION INTRAVENOUS at 16:32

## 2024-02-01 RX ADMIN — SODIUM CHLORIDE 500 ML: 9 INJECTION, SOLUTION INTRAVENOUS at 10:55

## 2024-02-01 RX ADMIN — IOPAMIDOL 100 ML: 755 INJECTION, SOLUTION INTRAVENOUS at 11:35

## 2024-02-01 RX ADMIN — FAMOTIDINE 20 MG: 20 TABLET, FILM COATED ORAL at 18:15

## 2024-02-01 RX ADMIN — MEBROFENIN 1 DOSE: 45 INJECTION, POWDER, LYOPHILIZED, FOR SOLUTION INTRAVENOUS at 14:30

## 2024-02-01 RX ADMIN — PIPERACILLIN AND TAZOBACTAM 3.38 G: 3; .375 INJECTION, POWDER, FOR SOLUTION INTRAVENOUS at 16:32

## 2024-02-01 NOTE — ED PROVIDER NOTES
Subjective   History of Present Illness  38-year-old  male with past significant history of Marfan syndrome presents to the ER with complaint of increased abdominal pain that started last night, through the night and into this morning.  He reports nausea but no vomiting or no diarrhea.  Patient states he is having normal stools.  Patient states he has a drain in place from the surgery and he empties the drain daily.  He states that he last emptied it last night.  Patient states that a few days ago he saw his surgeon and is scheduled to see him again next week.      Review of Systems   Constitutional:  Negative for fever.   Respiratory:  Negative for chest tightness and shortness of breath.    Cardiovascular:  Negative for chest pain.   Gastrointestinal:  Positive for abdominal pain and nausea. Negative for abdominal distention, blood in stool, diarrhea and vomiting.   Genitourinary:  Negative for dysuria and urgency.   Neurological:  Negative for weakness.       Past Medical History:   Diagnosis Date    History of open heart surgery     1997, 1998    Hyperlipidemia     Hypertension     Marfan's disease        No Known Allergies    Past Surgical History:   Procedure Laterality Date    CARDIAC VALVE REPLACEMENT      1998    CHOLECYSTECTOMY N/A 1/22/2024    Procedure: CHOLECYSTECTOMY LAPAROSCOPIC WITH DAVINCI ROBOT;  Surgeon: Chandrika Bran MD;  Location: [facility] MAIN OR;  Service: Robotics - DaVinci;  Laterality: N/A;    ERCP N/A 2/2/2024    Procedure: ENDOSCOPIC RETROGRADE CHOLANGIOPANCREATOGRAPHY with sphinctorotomy and placement of 10 Fr. x 7cm biliary stent;  Surgeon: Los Hodgson MD;  Location: [facility] ENDOSCOPY;  Service: Gastroenterology;  Laterality: N/A;       History reviewed. No pertinent family history.    Social History     Socioeconomic History    Marital status: Single   Tobacco Use    Smoking status: Some Days     Packs/day: .5     Types: Cigarettes     Passive exposure: Current     Smokeless tobacco: Never   Vaping Use    Vaping Use: Former   Substance and Sexual Activity    Alcohol use: Not Currently    Drug use: Yes     Types: Marijuana    Sexual activity: Defer           Objective   Physical Exam  Constitutional:       General: He is not in acute distress.     Appearance: He is not ill-appearing, toxic-appearing or diaphoretic.   HENT:      Head: Normocephalic and atraumatic.      Mouth/Throat:      Mouth: Mucous membranes are moist.   Cardiovascular:      Rate and Rhythm: Normal rate.   Pulmonary:      Effort: Pulmonary effort is normal.   Abdominal:      General: Abdomen is flat. Bowel sounds are normal.      Palpations: Abdomen is soft.   Skin:     General: Skin is warm and dry.      Capillary Refill: Capillary refill takes less than 2 seconds.   Neurological:      General: No focal deficit present.      Mental Status: He is alert and oriented to person, place, and time.         Procedures           ED Course  ED Course as of 02/08/24 1259   Thu Feb 01, 2024   1200 Dr Bran automated system states that she will look at CT and see him later. [CT]   1445 Dr Bran reports to Dr Burnham that HIDA scan reveals small leak.  Recommends admit to hospitalist. [CT]      ED Course User Index  [CT] Brit Crowley, STEVE      Medications   Indomethacin 100 MG  suppository suppository  - ADS Override Pull (has no administration in time range)   sodium chloride 0.9 % bolus 500 mL (0 mL Intravenous Stopped 2/1/24 1343)   iopamidol (ISOVUE-370) 76 % injection 100 mL (100 mL Intravenous Given 2/1/24 1135)   technetium Tc 99m mebrofenin (CHOLETEC) injection 1 dose (1 dose Intravenous Given 2/1/24 1430)   piperacillin-tazobactam (ZOSYN) IVPB 3.375 g in 100 mL NS (CD) (0 g Intravenous Stopped 2/1/24 1715)   sodium chloride 0.9 % infusion  - ADS Override Pull (  Override pull for Anesthesia 2/2/24 1136)   prochlorperazine (COMPAZINE) injection 10 mg (10 mg Intravenous Given 2/2/24 1921)   potassium  chloride (K-DUR,KLOR-CON) CR tablet 40 mEq (40 mEq Oral Given 2/3/24 0940)   warfarin (COUMADIN) tablet 10 mg (10 mg Oral Given 2/3/24 0945)                                 Labs Reviewed   RESPIRATORY PANEL PCR W/ COVID-19 (SARS-COV-2), NP SWAB IN [facility], 2 HR TAT - Abnormal; Notable for the following components:       Result Value    Influenza A H1 2009 PCR Detected (*)     All other components within normal limits    Narrative:     In the setting of a positive respiratory panel with a viral infection PLUS a negative procalcitonin without other underlying concern for bacterial infection, consider observing off antibiotics or discontinuation of antibiotics and continue supportive care. If the respiratory panel is positive for atypical bacterial infection (Bordetella pertussis, Chlamydophila pneumoniae, or Mycoplasma pneumoniae), consider antibiotic de-escalation to target atypical bacterial infection.   COMPREHENSIVE METABOLIC PANEL - Abnormal; Notable for the following components:    Glucose 149 (*)     Creatinine 0.58 (*)     Potassium 3.3 (*)     ALT (SGPT) 79 (*)     All other components within normal limits    Narrative:     GFR Normal >60  Chronic Kidney Disease <60  Kidney Failure <15     CBC WITH AUTO DIFFERENTIAL - Abnormal; Notable for the following components:    Neutrophil % 87.6 (*)     Lymphocyte % 5.8 (*)     Eosinophil % 0.2 (*)     Neutrophils, Absolute 8.50 (*)     Lymphocytes, Absolute 0.60 (*)     All other components within normal limits   URINALYSIS W/ CULTURE IF INDICATED - Abnormal; Notable for the following components:    Protein, UA 30 mg/dL (1+) (*)     All other components within normal limits    Narrative:     In absence of clinical symptoms, the presence of pyuria, bacteria, and/or nitrites on the urinalysis result does not correlate with infection.   URINE DRUG SCREEN - Abnormal; Notable for the following components:    THC, Screen, Urine Positive (*)     All other components within  normal limits    Narrative:     Negative Thresholds Per Drugs Screened:    Amphetamines                 500 ng/ml  Barbiturates                 200 ng/ml  Benzodiazepines              100 ng/ml  Cocaine                      300 ng/ml  Methadone                    300 ng/ml  Opiates                      300 ng/ml  Oxycodone                    100 ng/ml  THC                           50 ng/ml    The Normal Value for all drugs tested is negative. This report includes final unconfirmed screening results to be used for medical treatment purposes only. Unconfirmed results must not be used for non-medical purposes such as employment or legal testing. Clinical consideration should be applied to any drug of abuse test, particularly when unconfirmed results are used.          All urine drugs of abuse requests without chain of custody are for medical screening purposes only.  False positives are possible.     COMPREHENSIVE METABOLIC PANEL - Abnormal; Notable for the following components:    Glucose 105 (*)     Creatinine 0.64 (*)     Sodium 135 (*)     ALT (SGPT) 74 (*)     AST (SGOT) 45 (*)     All other components within normal limits    Narrative:     GFR Normal >60  Chronic Kidney Disease <60  Kidney Failure <15     CBC WITH AUTO DIFFERENTIAL - Abnormal; Notable for the following components:    Hemoglobin 12.9 (*)     All other components within normal limits   COMPREHENSIVE METABOLIC PANEL - Abnormal; Notable for the following components:    Glucose 122 (*)     Creatinine 0.62 (*)     Potassium 3.4 (*)     ALT (SGPT) 190 (*)     AST (SGOT) 153 (*)     All other components within normal limits    Narrative:     GFR Normal >60  Chronic Kidney Disease <60  Kidney Failure <15     CBC WITH AUTO DIFFERENTIAL - Abnormal; Notable for the following components:    Neutrophil % 87.7 (*)     Lymphocyte % 7.9 (*)     Monocyte % 4.2 (*)     Eosinophil % 0.0 (*)     Neutrophils, Absolute 8.80 (*)     All other components within normal  limits   COMPREHENSIVE METABOLIC PANEL - Abnormal; Notable for the following components:    Glucose 107 (*)     Creatinine 0.57 (*)     ALT (SGPT) 168 (*)     AST (SGOT) 93 (*)     All other components within normal limits    Narrative:     GFR Normal >60  Chronic Kidney Disease <60  Kidney Failure <15     COMPREHENSIVE METABOLIC PANEL - Abnormal; Notable for the following components:    Creatinine 0.72 (*)     Sodium 134 (*)     ALT (SGPT) 140 (*)     AST (SGOT) 62 (*)     All other components within normal limits    Narrative:     GFR Normal >60  Chronic Kidney Disease <60  Kidney Failure <15     CBC WITH AUTO DIFFERENTIAL - Abnormal; Notable for the following components:    Eosinophils, Absolute 0.50 (*)     All other components within normal limits   BLOOD CULTURE - Normal   BLOOD CULTURE - Normal   LIPASE - Normal   PROTIME-INR - Normal   PROTIME-INR - Normal   PROTIME-INR - Normal   POTASSIUM - Normal   PROTIME-INR - Normal   CBC WITH AUTO DIFFERENTIAL - Normal   PROTIME-INR - Normal   URINALYSIS, MICROSCOPIC ONLY   CBC AND DIFFERENTIAL    Narrative:     The following orders were created for panel order CBC & Differential.  Procedure                               Abnormality         Status                     ---------                               -----------         ------                     CBC Auto Differential[[id number]]        Abnormal            Final result                 Please view results for these tests on the individual orders.   CBC AND DIFFERENTIAL    Narrative:     The following orders were created for panel order CBC & Differential.  Procedure                               Abnormality         Status                     ---------                               -----------         ------                     CBC Auto Differential[[id number]]        Abnormal            Final result                 Please view results for these tests on the individual orders.   CBC AND DIFFERENTIAL    Narrative:      The following orders were created for panel order CBC & Differential.  Procedure                               Abnormality         Status                     ---------                               -----------         ------                     CBC Auto Differential[[id number]]        Abnormal            Final result                 Please view results for these tests on the individual orders.   CBC AND DIFFERENTIAL    Narrative:     The following orders were created for panel order CBC & Differential.  Procedure                               Abnormality         Status                     ---------                               -----------         ------                     CBC Auto Differential[[id number]]        Normal              Final result                 Please view results for these tests on the individual orders.   CBC AND DIFFERENTIAL    Narrative:     The following orders were created for panel order CBC & Differential.  Procedure                               Abnormality         Status                     ---------                               -----------         ------                     CBC Auto Differential[[id number]]        Abnormal            Final result                 Please view results for these tests on the individual orders.            No radiology results for the last day   Medical Decision Making  38-year-old male with previous cholecystectomy performed a week ago by Dr. Bran presents to the emergency room with complaint of abdominal pain.  Patient states that he saw his surgeon a few days ago and had worsening abdominal pain starting last night and continued through to today.  Patient states that in fact he has not been able to get much sleep last night due to the abdominal pain.  Patient denies nausea or vomiting.  Patient reports normal stools.    Peripheral IV obtained and 1 L fluid bolus administered.  Administered with 2 mg of morphine and 4 mg of Zofran to treat his  pain.    Problems Addressed:  Generalized abdominal pain: complicated acute illness or injury    Amount and/or Complexity of Data Reviewed  Labs: ordered.     Details: CBC and CMP are unremarkable.  Radiology: ordered.     Details: CT of the abdomen and pelvis is comparable to previous if not a little improved.  Dr. Bran was contacted and she agrees to see him and will order an HIDA scan.  Will place in OBS.    Risk  Prescription drug management.  Decision regarding hospitalization.  Risk Details: Place in OBS to have HIDA scan ordered by Dr Bran and DR Bran agrees to see later today.        Final diagnoses:   Generalized abdominal pain       ED Disposition  ED Disposition       ED Disposition   Decision to Admit    Condition   --    Comment   Level of Care: Med/Surg [1]   Diagnosis: Bile leak [501507]   Admitting Physician: MARLENI REYNOLDS [[id number]]   Attending Physician: MARLENI REYNOLDS [[id number]]   Certification: I Certify That Inpatient Hospital Services Are Medically Necessary For Greater Than 2 Midnights                 Wally Henderson MD  [address], [address]  [facility] [address]  [phone]          [address], [address]  [facility] [address]  [phone]        Chandrika Bran MD  [address] [place] [address]  [place] [address] [phone]    Schedule an appointment as soon as possible for a visit in 1 week(s)  Make appt with Dr Gomez for follow up and to have TAL drain removed.         Medication List        New Prescriptions      amoxicillin-clavulanate 875-125 MG per tablet  Commonly known as: AUGMENTIN  Take 1 tablet by mouth Every 12 (Twelve) Hours for 10 doses. Indications: Infection Within the Abdomen     oseltamivir 75 MG capsule  Commonly known as: TAMIFLU  Take 1 capsule by mouth Every 12 (Twelve) Hours for 6 doses. Indications: Influenza A            Stop      Enoxaparin Sodium 80 MG/0.8ML solution prefilled  syringe syringe  Commonly known as: LOVENOX               Where to Get Your Medications        These medications were sent to [id number] - [place], [place] - [address] [facility] [facility] [address] - [phone]  - [phone] FX  [address] [place] [address]      Phone: [phone]   amoxicillin-clavulanate 875-125 MG per tablet  oseltamivir 75 MG capsule            Brit Crowley, APRN  02/01/24 1343       Brit Crowley, APRN  02/08/24 1250

## 2024-02-01 NOTE — H&P
Hospitalist Service  History and Physical      Patient Name: James Rodriguez  : 1985  MRN: [id number]  Primary Care Physician:  Wally Henderson MD  Date of admission: 2024      Subjective      Chief Complaint: Abdominal pain    History of Present Illness: James Rodriguez is a 38 y.o. male with past medical history of Marfan syndrome, hypertension, chronic anticoagulation, and recent cholecystectomy who presented to [facility] on 2024 complaining of worsening abdominal pain and nausea for 4 days.  Patient had laparoscopic subtotal cholecystectomy by Dr. Bran on .  He was bridged with Lovenox/warfarin and sent home with drain.  Patient states he was recovering well but 4 days ago started having worsening abdominal pain with some nausea.  No diarrhea, fever, chills, or dysuria.  Drain with dark-colored output noted which is changed from previous per the patient.  In the ER, HIDA scan positive for bile leak.  General surgery consulted and recommending GI evaluation.      ROS: Pertinent positives as noted in HPI/subjective.  All other systems were reviewed and are negative.      Personal History     Past Medical History:   Diagnosis Date    History of open heart surgery     ,     Hyperlipidemia     Hypertension     Marfan's disease        Past Surgical History:   Procedure Laterality Date    CARDIAC VALVE REPLACEMENT          CHOLECYSTECTOMY N/A 2024    Procedure: CHOLECYSTECTOMY LAPAROSCOPIC WITH DAVINCI ROBOT;  Surgeon: Chandrika Bran MD;  Location: [facility];  Service: Robotics - DaVinci;  Laterality: N/A;       Family History: family history is not on file. Otherwise pertinent FHx was reviewed and not pertinent to current issue.    Social History:  reports that he quit smoking about 2 weeks ago. His smoking use included cigarettes. He smoked an average of .5 packs per day. He has been exposed to tobacco smoke. He has never used smokeless tobacco.  He reports that he does not currently use alcohol. He reports current drug use. Drug: Marijuana.    Home Medications:  Prior to Admission Medications       Prescriptions Last Dose Informant Patient Reported? Taking?    acetaminophen (TYLENOL) 500 MG tablet   Yes No    Take 1 tablet by mouth Every 4 (Four) Hours As Needed for Mild Pain, Fever or Headache.    atorvastatin (LIPITOR) 10 MG tablet   Yes No    Take 1 tablet by mouth Daily.    Enoxaparin Sodium (LOVENOX) 80 MG/0.8ML solution prefilled syringe syringe   No No    Inject 0.7 mL under the skin into the appropriate area as directed Every 12 (Twelve) Hours for 7 days. Indications: Presence of Mechanical Artificial Heart Valve, brige for Monday procedure    famotidine (PEPCID) tablet 20 mg   No No    losartan (COZAAR) 100 MG tablet   Yes No    Take 1 tablet by mouth Daily.    sertraline (ZOLOFT) 100 MG tablet   Yes No    Take 2 tablets by mouth Daily.    warfarin (COUMADIN) 7.5 MG tablet   Yes No    Take 1 tablet by mouth Every Night.              I have utilized all available, immediate resources to obtain, update, or review the patient's current medications including all prescriptions, over-the-counter products, herbals, cannabis/cannabidiol products, and vitamin.mineral/dietary (nutritional) supplements.    Allergies:  No Known Allergies    Objective      Vitals:   Temp:  [97 °F (36.1 °C)] 97 °F (36.1 °C)  Heart Rate:  [52-57] 54  Resp:  [14-18] 14  BP: ()/(61-66) 99/63    Physical Exam  Constitutional:       General: He is not in acute distress.     Appearance: Normal appearance. He is not toxic-appearing.   HENT:      Head: Normocephalic and atraumatic.      Mouth/Throat:      Mouth: Mucous membranes are moist.      Pharynx: Oropharynx is clear.   Eyes:      Extraocular Movements: Extraocular movements intact.      Conjunctiva/sclera: Conjunctivae normal.      Pupils: Pupils are equal, round, and reactive to light.   Cardiovascular:      Rate and  Rhythm: Normal rate and regular rhythm.      Pulses: Normal pulses.      Heart sounds: Normal heart sounds. No murmur heard.  Pulmonary:      Effort: Pulmonary effort is normal. No respiratory distress.      Breath sounds: Normal breath sounds. No wheezing, rhonchi or rales.   Abdominal:      General: Bowel sounds are normal. There is no distension.      Palpations: Abdomen is soft.      Tenderness: There is abdominal tenderness. There is no guarding.   Musculoskeletal:         General: No swelling. Normal range of motion.      Cervical back: Normal range of motion and neck supple.   Skin:     General: Skin is warm and dry.   Neurological:      General: No focal deficit present.      Mental Status: He is alert and oriented to person, place, and time. Mental status is at baseline.      Cranial Nerves: No cranial nerve deficit.   Psychiatric:         Mood and Affect: Mood normal.         Behavior: Behavior normal.         Thought Content: Thought content normal.         Result Review    Result Review:  I have personally reviewed the results from the time of this admission to 2/1/2024 15:22 EST and agree with these findings:  [x]  Laboratory  [x]  Microbiology  [x]  Radiology  []  EKG/Telemetry   []  Cardiology/Vascular   []  Pathology  [x]  Old records  []  Other:      Assessment & Plan        Active Hospital Problems:  Active Hospital Problems    Diagnosis     **Abdominal pain     Bile leak        Assessment/Plan:     Abdominal pain with bile leak  -s/p recent laparoscopic subtotal cholecystectomy on January 22  -TAL drain with bile output noted  -HIDA scan positive for bile leak  -Case discussed with general surgery Dr. Bran, recommending GI evaluation for endoscopy  -CLD for now, n.p.o. past midnight  -Start empiric IV Zosyn    Hypokalemia  -Replacement protocol ordered    Marfan syndrome  Chronic anticoagulation  -Holding warfarin/Lovenox for now for procedure as above  -Resume anticoagulation  postprocedure  -Continue supportive care    Hypertension  -Continue losartan, monitor    DVT prophylaxis:  Mechanical DVT prophylaxis orders are present.        Differential diagnosis including as mentioned above in A/P. Discussed the case in detail with the patient/family who also stated the patient has above. Discussed the case in details with consultants who agreed with the current diagnosis of Abdominal pain and agreed with the treatment plan for this. I reviewed the data independently as mentioned in my MDM. Shared decision making completed with the clinician and patient regarding risk and benefits of treatment plan and patient agreed to proceed with the above mentioned treatment plan.    CODE STATUS:    Code Status (Patient has no pulse and is not breathing): CPR (Attempt to Resuscitate)  Medical Interventions (Patient has pulse or is breathing): Full Support      Admission Status:  I believe this patient meets inpt status.    Signature:   Electronically signed by Mauricio Miranda DO, 02/01/24, 3:22 PM EST.    Part of this note may be an electronic transcription/translation of spoken language to printed text using the Dragon Dictation System.

## 2024-02-01 NOTE — ED NOTES
Pt blood pressure was low, provider notified; advised to hold off on pain medication for now by provider; pt updated; pt girlfriend stated that pt forgot how to empty his TAL drain; RN asked pt how they had been managing for two weeks at home; demonstration provided & pt understanding of how to empty drain at this time

## 2024-02-01 NOTE — DISCHARGE PLACEMENT REQUEST
"Shawn Rodriguez (38 y.o. Male)       Date of Birth   1985    Social Security Number       Address   [address] [place] [address] [place] [address]    Home Phone   [phone]    MRN   [id number]       Tenriism   None    Marital Status   Single                            Admission Date   2/1/24    Admission Type   Emergency    Admitting Provider   Mauricio Miranda DO    Attending Provider   Mauricio Miranda DO    Department, Room/Bed   [facility] EMERGENCY DEPARTMENT, 06/06       Discharge Date       Discharge Disposition       Discharge Destination                                 Attending Provider: Mauricio Miranda DO    Allergies: No Known Allergies    Isolation: None   Infection: None   Code Status: CPR    Ht: 193 cm (76\")   Wt: 70.8 kg (156 lb)    Admission Cmt: None   Principal Problem: Abdominal pain [R10.9]                   Active Insurance as of 2/1/2024       Primary Coverage       Payor Plan Insurance Group Employer/Plan Group    MEDICARE MEDICARE A & B        Payor Plan Address Payor Plan Phone Number Payor Plan Fax Number Effective Dates    [address] [phone]  6/1/2007 - None Entered    [facility] [id number]         Subscriber Name Subscriber Birth Date Member ID       SHAWN RODRIGUEZ 1985 [id number]               Secondary Coverage       Payor Plan Insurance Group Employer/Plan Group    [place]        Payor Plan Address Payor Plan Phone Number Payor Plan Fax Number Effective Dates    [address]   7/1/2023 - None Entered    [place] [address]         Subscriber Name Subscriber Birth Date Member ID       SHAWN RODRIGUEZ 1985 [id number]                     Emergency Contacts        (Rel.) Home Phone Work Phone Mobile Phone    BALAJI GARCÍA (Significant Other) -- -- [phone]    LUIS CARVALHO (Relative) -- -- [phone]                "

## 2024-02-01 NOTE — Clinical Note
Level of Care: Med/Surg [1]   Admitting Physician: MARLENI REYNOLDS [[id number]]   Attending Physician: MARLENI REYNOLDS [[id number]]

## 2024-02-01 NOTE — PROGRESS NOTES
General Surgery Inpatient Progress Note      Name: James Rodriguez ADMIT: 2024   : 1985  PCP: Wally Henderson MD    MRN: [id number] LOS: 0 days   AGE/SEX: 38 y.o. male  ROOM: [facility]      Patient Care Team:  Wally Henderson MD as PCP - General (Internal Medicine)  Chandrika Bran MD as Surgeon (General Surgery)  Chief Complaint   Patient presents with    Abdominal Pain     Abd pain, back pain since he had appendix out, pt has drain intact.  Surgery was a week ago.         Subjective:  Patient is a 38-year-old male status post robotic assisted laparoscopic subtotal cholecystectomy on 2024.  At the time of surgery he had extensive fibrotic changes making dissection difficult and I was unable to safely dissect circumferentially around the cystic duct stump to clip it.  A drain was left in the gallbladder fossa.  He was seen 3 days ago in the clinic and at that time the drain was noted to be serosanguineous and with minimal drain output.  He did complain of low-grade fevers so he was sent for CT abdomen/pelvis.  At that time there was a small fluid collection in the area of the drain.  Originally thought this was likely postop as output was minimal and drain was in good position so he was scheduled for follow-up in 1 week.  He subsequently reports worsening pain for the last 1 day and states the drain color changed over the last 24 hours.    Medications:  famotidine, 20 mg, Oral, BID AC  Morphine, 2 mg, Intravenous, Once  ondansetron, 4 mg, Intravenous, Once         [COMPLETED] Insert Peripheral IV **AND** sodium chloride     Vitals:  Temp:  [97 °F (36.1 °C)] 97 °F (36.1 °C)  Heart Rate:  [52-57] 54  Resp:  [14-18] 14  BP: ()/(61-66) 99/63     Physical Exam:  No acute distress, alert  Nonlabored respirations  Abdomen soft, nondistended, minimally tender to palpation, TAL drain with bilious output    Labs:  Results from last 7 days   Lab Units 24  1037   WBC 10*3/mm3  9.70   HEMOGLOBIN g/dL 14.6   HEMATOCRIT % 42.9   PLATELETS 10*3/mm3 290     Results from last 7 days   Lab Units 02/01/24  1037   SODIUM mmol/L 137   POTASSIUM mmol/L 3.3*   CHLORIDE mmol/L 100   CO2 mmol/L 27.0   BUN mg/dL 12   CREATININE mg/dL 0.58*   CALCIUM mg/dL 8.7   BILIRUBIN mg/dL 0.3   ALK PHOS U/L 83   ALT (SGPT) U/L 79*   AST (SGOT) U/L 34   GLUCOSE mg/dL 149*     Results from last 7 days   Lab Units 02/01/24  1037   INR  2.40     Imaging:  CT abdomen/pelvis 2/1/2024  IMPRESSION:  1. Stable fluid collection in the cholecystectomy bed, containing slightly less gas than previously. Abscess or contained bile leak are not excluded  2. New small amount of perihepatic and pelvic free fluid. Consider HIDA scan if there is clinical concern for bile leak    HIDA 2/1/2024  Imaging completed however report is pending.  On my review of imaging it does not appear he has a small bile leak which is evident on imaging at 60 minutes with tracer in the drain tube    Assessment and Plan:  38 y.o. male that is post robotic assisted laparoscopic subtotal cholecystectomy on 1/22/2024 with bile leak likely from cystic duct stump.    - GI consult for evaluation for ERCP and stent placement; procedure planned for tomorrow  - Continue TAL drain  - Hold warfarin for possible procedure    This note was created using Dragon Voice Recognition software.    Chandrika Bran MD  02/01/24  15:15 EST

## 2024-02-01 NOTE — CASE MANAGEMENT/SOCIAL WORK
Discharge Planning Assessment   Brian     Patient Name: James Rodriguez  MRN: [id number]  Today's Date: 2/1/2024    Admit Date: 2/1/2024    Plan: Home [facility] (pending confirmation, will need order.)   Discharge Needs Assessment       Row Name 02/01/24 1707       Living Environment    People in Home significant other    Current Living Arrangements home    Potentially Unsafe Housing Conditions none    In the past 12 months has the electric, gas, oil, or water company threatened to shut off services in your home? No    Primary Care Provided by self    Provides Primary Care For no one    Able to Return to Prior Arrangements yes       Resource/Environmental Concerns    Resource/Environmental Concerns none    Transportation Concerns none       Transportation Needs    In the past 12 months, has lack of transportation kept you from medical appointments or from getting medications? no    In the past 12 months, has lack of transportation kept you from meetings, work, or from getting things needed for daily living? No       Food Insecurity    Within the past 12 months, you worried that your food would run out before you got the money to buy more. Never true    Within the past 12 months, the food you bought just didn't last and you didn't have money to get more. Never true       Transition Planning    Patient/Family Anticipates Transition to home with family    Patient/Family Anticipated Services at Transition home health care    Transportation Anticipated health plan transportation       Discharge Needs Assessment    Readmission Within the Last 30 Days no previous admission in last 30 days    Equipment Currently Used at Home none    Concerns to be Addressed denies needs/concerns at this time    Anticipated Changes Related to Illness none    Equipment Needed After Discharge none                   Discharge Plan       Row Name 02/01/24 1709       Plan    Plan Home current [facility] (pending confirmation, will need order.)     Plan Comments Met with patient and significant other at bedside lives at home with significant other who provides transportation. I ADLs' PCP and Pharmacy verified. Able to afford medications with insurance. Patietn and significant other is on a tight budget but they make ends meet. Requested Laureen Guest tray for Significant other while at hospital do to financial strain. Inquired with  and they stated each floor has We care meal tray tickets the nurse can fill out to provide guest tray at no charge. bedside Nurse notified, no located in ED, Nurse to contact inpatient unit to find one for significant other. DC barriers: Surgery and Gi consult, Planning ERCP                  Continued Care and Services - Admitted Since 2/1/2024       Home Medical Care       Service Provider Request Status Selected Services Address Phone Fax Patient Preferred    [facility] Sent N[facility]A [address] [facility] [address] [id number] [phone] --                  Selected Continued Care - Prior Encounters Includes continued care and service providers with selected services from prior encounters from 11/3/2023 to 2/1/2024      Discharged on 1/25/2024 Admission date: 1/16/2024 - Discharge disposition: Home-Health Care [facility]      Home Medical Care       Service Provider Selected Services Address Phone Fax Patient Preferred    [facility] [address], [place] [id number] [phone] --                             Demographic Summary       Row Name 02/01/24 1645       General Information    Admission Type inpatient    Arrived From emergency department    Required Notices Provided Important Message from Medicare    Referral Source admission list    Reason for Consult discharge planning    Preferred Language English                   Functional Status       Row Name 02/01/24 1707       Functional Status     Usual Activity Tolerance moderate    Current Activity Tolerance moderate       Physical Activity    On average, how many days per week do you engage in moderate to strenuous exercise (like a brisk walk)? 2 days    On average, how many minutes do you engage in exercise at this level? 20 min    Number of minutes of exercise per week 40       Functional Status, IADL    Medications independent    Meal Preparation independent    Housekeeping independent    Laundry independent    Shopping independent       Mental Status    General Appearance WDL WDL       Mental Status Summary    Recent Changes in Mental Status/Cognitive Functioning no changes                          Patient Forms       Row Name 02/01/24 3143       Patient Forms    Important Message from Medicare (IMM) --  [facility] 2/1 per reg                      Nasra Ayers RN

## 2024-02-01 NOTE — ED NOTES
Pt had surgery on gallbladder recently & has TAL bulb to right side; pt complaining of abdominal pain & back pain; pt states pain feels like pressure and is intense

## 2024-02-02 ENCOUNTER — ANESTHESIA EVENT (OUTPATIENT)
Dept: GASTROENTEROLOGY | Facility: HOSPITAL | Age: 39
End: 2024-02-02
Payer: MEDICARE

## 2024-02-02 ENCOUNTER — ANESTHESIA (OUTPATIENT)
Dept: GASTROENTEROLOGY | Facility: HOSPITAL | Age: 39
End: 2024-02-02
Payer: MEDICARE

## 2024-02-02 ENCOUNTER — INPATIENT HOSPITAL (OUTPATIENT)
Dept: URBAN - METROPOLITAN AREA HOSPITAL 84 | Facility: HOSPITAL | Age: 39
End: 2024-02-02
Payer: MEDICAID

## 2024-02-02 ENCOUNTER — APPOINTMENT (OUTPATIENT)
Dept: GENERAL RADIOLOGY | Facility: HOSPITAL | Age: 39
DRG: 394 | End: 2024-02-02
Payer: MEDICARE

## 2024-02-02 DIAGNOSIS — K83.9 DISEASE OF BILIARY TRACT, UNSPECIFIED: ICD-10-CM

## 2024-02-02 PROCEDURE — 43274 ERCP DUCT STENT PLACEMENT: CPT | Performed by: INTERNAL MEDICINE

## 2024-02-02 PROCEDURE — 74328 X-RAY BILE DUCT ENDOSCOPY: CPT

## 2024-02-02 PROCEDURE — 25010000002 FENTANYL CITRATE (PF) 100 MCG/2ML SOLUTION: Performed by: NURSE ANESTHETIST, CERTIFIED REGISTERED

## 2024-02-02 PROCEDURE — C1769 GUIDE WIRE: HCPCS | Performed by: INTERNAL MEDICINE

## 2024-02-02 PROCEDURE — 25010000002 PIPERACILLIN SOD-TAZOBACTAM PER 1 G: Performed by: INTERNAL MEDICINE

## 2024-02-02 PROCEDURE — 25010000002 SUCCINYLCHOLINE PER 20 MG: Performed by: NURSE ANESTHETIST, CERTIFIED REGISTERED

## 2024-02-02 PROCEDURE — 25010000002 PROPOFOL 200 MG/20ML EMULSION: Performed by: NURSE ANESTHETIST, CERTIFIED REGISTERED

## 2024-02-02 PROCEDURE — 43262 ENDO CHOLANGIOPANCREATOGRAPH: CPT | Mod: 59 | Performed by: INTERNAL MEDICINE

## 2024-02-02 PROCEDURE — C2625 STENT, NON-COR, TEM W/DEL SY: HCPCS | Performed by: INTERNAL MEDICINE

## 2024-02-02 PROCEDURE — 25810000003 SODIUM CHLORIDE 0.9 % SOLUTION: Performed by: INTERNAL MEDICINE

## 2024-02-02 PROCEDURE — 25010000002 ONDANSETRON PER 1 MG: Performed by: INTERNAL MEDICINE

## 2024-02-02 PROCEDURE — 25010000002 PROCHLORPERAZINE 10 MG/2ML SOLUTION: Performed by: STUDENT IN AN ORGANIZED HEALTH CARE EDUCATION/TRAINING PROGRAM

## 2024-02-02 PROCEDURE — 25810000003 SODIUM CHLORIDE 0.9 % SOLUTION: Performed by: NURSE ANESTHETIST, CERTIFIED REGISTERED

## 2024-02-02 PROCEDURE — 25010000002 ENOXAPARIN PER 10 MG: Performed by: INTERNAL MEDICINE

## 2024-02-02 PROCEDURE — 0F798DZ DILATION OF COMMON BILE DUCT WITH INTRALUMINAL DEVICE, VIA NATURAL OR ARTIFICIAL OPENING ENDOSCOPIC: ICD-10-PCS | Performed by: INTERNAL MEDICINE

## 2024-02-02 DEVICE — BILIARY STENT WITH NAVIFLEXTM RX DELIVERY SYSTEM
Type: IMPLANTABLE DEVICE | Site: BILE DUCT | Status: FUNCTIONAL
Brand: ADVANIX™ BILIARY

## 2024-02-02 RX ORDER — LIDOCAINE HYDROCHLORIDE 10 MG/ML
INJECTION, SOLUTION EPIDURAL; INFILTRATION; INTRACAUDAL; PERINEURAL AS NEEDED
Status: DISCONTINUED | OUTPATIENT
Start: 2024-02-02 | End: 2024-02-02 | Stop reason: SURG

## 2024-02-02 RX ORDER — ONDANSETRON 4 MG/1
4 TABLET, ORALLY DISINTEGRATING ORAL EVERY 6 HOURS PRN
Status: DISCONTINUED | OUTPATIENT
Start: 2024-02-02 | End: 2024-02-02 | Stop reason: SDUPTHER

## 2024-02-02 RX ORDER — SODIUM CHLORIDE 9 MG/ML
INJECTION, SOLUTION INTRAVENOUS
Status: COMPLETED
Start: 2024-02-02 | End: 2024-02-02

## 2024-02-02 RX ORDER — ENOXAPARIN SODIUM 100 MG/ML
1 INJECTION SUBCUTANEOUS EVERY 12 HOURS
Status: DISCONTINUED | OUTPATIENT
Start: 2024-02-02 | End: 2024-02-03

## 2024-02-02 RX ORDER — ONDANSETRON 2 MG/ML
4 INJECTION INTRAMUSCULAR; INTRAVENOUS EVERY 6 HOURS PRN
Status: DISCONTINUED | OUTPATIENT
Start: 2024-02-02 | End: 2024-02-02 | Stop reason: SDUPTHER

## 2024-02-02 RX ORDER — PROPOFOL 10 MG/ML
INJECTION, EMULSION INTRAVENOUS AS NEEDED
Status: DISCONTINUED | OUTPATIENT
Start: 2024-02-02 | End: 2024-02-02 | Stop reason: SURG

## 2024-02-02 RX ORDER — INDOMETHACIN 100 MG
SUPPOSITORY, RECTAL RECTAL
Status: DISPENSED
Start: 2024-02-02 | End: 2024-02-02

## 2024-02-02 RX ORDER — SUCCINYLCHOLINE CHLORIDE 20 MG/ML
INJECTION INTRAMUSCULAR; INTRAVENOUS AS NEEDED
Status: DISCONTINUED | OUTPATIENT
Start: 2024-02-02 | End: 2024-02-02 | Stop reason: SURG

## 2024-02-02 RX ORDER — PROCHLORPERAZINE EDISYLATE 5 MG/ML
10 INJECTION INTRAMUSCULAR; INTRAVENOUS ONCE
Status: COMPLETED | OUTPATIENT
Start: 2024-02-02 | End: 2024-02-02

## 2024-02-02 RX ORDER — SODIUM CHLORIDE 9 MG/ML
INJECTION, SOLUTION INTRAVENOUS CONTINUOUS PRN
Status: DISCONTINUED | OUTPATIENT
Start: 2024-02-02 | End: 2024-02-02 | Stop reason: SURG

## 2024-02-02 RX ORDER — FENTANYL CITRATE 50 UG/ML
INJECTION, SOLUTION INTRAMUSCULAR; INTRAVENOUS AS NEEDED
Status: DISCONTINUED | OUTPATIENT
Start: 2024-02-02 | End: 2024-02-02 | Stop reason: SURG

## 2024-02-02 RX ORDER — INDOMETHACIN 100 MG
SUPPOSITORY, RECTAL RECTAL AS NEEDED
Status: DISCONTINUED | OUTPATIENT
Start: 2024-02-02 | End: 2024-02-02 | Stop reason: HOSPADM

## 2024-02-02 RX ADMIN — FENTANYL CITRATE 50 MCG: 50 INJECTION, SOLUTION INTRAMUSCULAR; INTRAVENOUS at 11:22

## 2024-02-02 RX ADMIN — PROCHLORPERAZINE EDISYLATE 10 MG: 5 INJECTION INTRAMUSCULAR; INTRAVENOUS at 19:21

## 2024-02-02 RX ADMIN — SUCCINYLCHOLINE CHLORIDE 60 MG: 20 INJECTION, SOLUTION INTRAMUSCULAR; INTRAVENOUS at 11:22

## 2024-02-02 RX ADMIN — SODIUM CHLORIDE: 9 INJECTION, SOLUTION INTRAVENOUS at 11:22

## 2024-02-02 RX ADMIN — PIPERACILLIN AND TAZOBACTAM 3.38 G: 3; .375 INJECTION, POWDER, FOR SOLUTION INTRAVENOUS at 14:42

## 2024-02-02 RX ADMIN — FENTANYL CITRATE 50 MCG: 50 INJECTION, SOLUTION INTRAMUSCULAR; INTRAVENOUS at 11:30

## 2024-02-02 RX ADMIN — PIPERACILLIN AND TAZOBACTAM 3.38 G: 3; .375 INJECTION, POWDER, FOR SOLUTION INTRAVENOUS at 22:32

## 2024-02-02 RX ADMIN — FAMOTIDINE 20 MG: 20 TABLET, FILM COATED ORAL at 16:33

## 2024-02-02 RX ADMIN — ENOXAPARIN SODIUM 70 MG: 100 INJECTION SUBCUTANEOUS at 18:17

## 2024-02-02 RX ADMIN — PROPOFOL 200 MG: 10 INJECTION, EMULSION INTRAVENOUS at 11:22

## 2024-02-02 RX ADMIN — LIDOCAINE HYDROCHLORIDE 50 MG: 10 INJECTION, SOLUTION EPIDURAL; INFILTRATION; INTRACAUDAL; PERINEURAL at 11:22

## 2024-02-02 RX ADMIN — PIPERACILLIN AND TAZOBACTAM 3.38 G: 3; .375 INJECTION, POWDER, FOR SOLUTION INTRAVENOUS at 05:42

## 2024-02-02 RX ADMIN — SODIUM CHLORIDE 100 ML/HR: 9 INJECTION, SOLUTION INTRAVENOUS at 08:55

## 2024-02-02 RX ADMIN — ONDANSETRON 4 MG: 2 INJECTION INTRAMUSCULAR; INTRAVENOUS at 18:17

## 2024-02-02 NOTE — PLAN OF CARE
Goal Outcome Evaluation:            ERCP done today. Patient resting comfortably in bed, vs stable. No nursing concerns

## 2024-02-02 NOTE — CONSULTS
Nutrition Services    Patient Name: James Rodriguez  YOB: 1985  MRN: [id number]  Admission date: 2/1/2024    Comment:    See MSA below.    Moderate chronic disease-related malnutrition related to chronic suboptimal intake in the setting of multiple medical problems as evidenced by > 20% weight loss within the past year and moderate muscle/fat loss per NFPE.     RD to order Boost Plus BID (Provides 720 kcals, 28 g protein if consumed) - pt prefers chocolate. Boost Glucose Control may be substituted for Boost Plus at this time, due to national shortage of many ONS products. If substituted, each Boost Glucose Control will provide 190 kcal and 16g PRO.    RD to continue to monitor.    CLINICAL NUTRITION ASSESSMENT      Reason for Assessment 2/2 - BMI      H&P      Past Medical History:   Diagnosis Date    History of open heart surgery     1997, 1998    Hyperlipidemia     Hypertension     Marfan's disease        Past Surgical History:   Procedure Laterality Date    CARDIAC VALVE REPLACEMENT      1998    CHOLECYSTECTOMY N/A 1/22/2024    Procedure: CHOLECYSTECTOMY LAPAROSCOPIC WITH DAVINCI ROBOT;  Surgeon: Chandrika Bran MD;  Location: [facility];  Service: Robotics - DaVinci;  Laterality: N/A;        Current Problems   Abdominal pain with bile leak  -  ERCP 2/2  - GI following    Marfan syndrome       Encounter Information        Trending Narrative     2/2 - Pt admitted to ED 2/1 with increased abdominal pain. Pt underwent ERCP 2/2. Dietetic intern visited the pt at bedside. Patient reported having a much better appetite now that he has been moved to a healthy heart diet. Patient reports he is unsure about recent weight loss or gain. Per chart, pt has recent weight loss. Pt reports drinking Ensure in the past, agreeable with Boost this admission. RD to order Boost Plus BID (Provides 720 kcals, 28 g protein if consumed) - pt prefers chocolate. Boost Glucose Control may be substituted for Boost Plus at  "this time, due to national shortage of many ONS products. If substituted, each Boost Glucose Control will provide 190 kcal and 16g PRO. NFPE completed, consistent with nutrition diagnosis of malnutrition using AND/ASPEN criteria. See MSA below. RD to continue to monitor.       Anthropometrics        Current Height, Weight Height: 193 cm (76\")  Weight: 69.7 kg (153 lb 9.6 oz) (02/01/24 2149)       Usual Body Weight (UBW) Pt unsure       Trending Weight Hx     This admission: 153 lbs             PTA: 2/2 - 20.9% x 5 mo(using wt from 8/27/23)    Wt Readings from Last 30 Encounters:   02/01/24 2149 69.7 kg (153 lb 9.6 oz)   02/01/24 1521 70.8 kg (156 lb)   02/01/24 1003 70.8 kg (156 lb)   01/29/24 0931 70.8 kg (156 lb)   01/20/24 1527 72.7 kg (160 lb 3.2 oz)   01/19/24 1414 76.7 kg (169 lb)   01/16/24 0614 77.1 kg (169 lb 15.6 oz)   12/22/23 1724 81.6 kg (180 lb)   08/27/23 2211 83.9 kg (185 lb)   07/17/23 1914 69.9 kg (154 lb 1.6 oz)      BMI kg/m2 Body mass index is 18.7 kg/m².       Labs        Pertinent Labs Reviewed, management per attending.   Results from last 7 days   Lab Units 02/01/24  2300 02/01/24  1037   SODIUM mmol/L 135* 137   POTASSIUM mmol/L 4.0 3.3*   CHLORIDE mmol/L 101 100   CO2 mmol/L 27.0 27.0   BUN mg/dL 9 12   CREATININE mg/dL 0.64* 0.58*   CALCIUM mg/dL 9.3 8.7   BILIRUBIN mg/dL 0.4 0.3   ALK PHOS U/L 77 83   ALT (SGPT) U/L 74* 79*   AST (SGOT) U/L 45* 34   GLUCOSE mg/dL 105* 149*     Results from last 7 days   Lab Units 02/01/24  2300   HEMOGLOBIN g/dL 12.9*   HEMATOCRIT % 38.8     Lab Results   Component Value Date    HGBA1C 5.50 01/16/2024        Medications    Scheduled Medications atorvastatin, 10 mg, Oral, Daily  famotidine, 20 mg, Oral, BID AC  Indomethacin, , ,   losartan, 100 mg, Oral, Daily  piperacillin-tazobactam, 3.375 g, Intravenous, Q8H  senna-docusate sodium, 2 tablet, Oral, BID  sertraline, 200 mg, Oral, Daily  sodium chloride, 10 mL, Intravenous, Q12H        Infusions sodium " chloride, 100 mL/hr, Last Rate: 100 mL/hr (02/02/24 3149)        PRN Medications   acetaminophen **OR** acetaminophen **OR** acetaminophen    senna-docusate sodium **AND** polyethylene glycol **AND** bisacodyl **AND** bisacodyl    Calcium Replacement - Follow Nurse / BPA Driven Protocol    Indomethacin    Magnesium Standard Dose Replacement - Follow Nurse / BPA Driven Protocol    Morphine    ondansetron ODT **OR** ondansetron    Phosphorus Replacement - Follow Nurse / BPA Driven Protocol    Potassium Replacement - Follow Nurse / BPA Driven Protocol    sodium chloride    sodium chloride     Physical Findings        Trending Physical   Appearance, NFPE 2/2 - NFPE completed, consistent with nutrition diagnosis of malnutrition using AND/ASPEN criteria. See MSA below.      --  Edema  No documented edema     Bowel Function Last documented BM 1/30. Scheduled pericolace in place, pt NPO during last two doses. PRN bowel regimen available.     Tubes No feeding tube in place.     Chewing/Swallowing No documented chewing or swallowing issues.     Skin Intact     --  Current Nutrition Orders & Evaluation of Intake       Oral Nutrition     Food Allergies NKFA   Current PO Diet Diet: Cardiac Diets; Healthy Heart (2-3 Na+); Texture: Regular Texture (IDDSI 7); Fluid Consistency: Thin (IDDSI 0)   Supplement No supplement ordered.   PO Evaluation     Trending % PO Intake 2/2 - 0% intake recorded so far, pt NPO for most of this admission.   --  Nutritional Risk Screening        NRS-2002 Score          Nutrition Diagnosis         Nutrition Dx Problem 1 Moderate chronic disease-related malnutrition related to chronic suboptimal intake in the setting of multiple medical problems as evidenced by > 20% weight loss within the past year and moderate muscle/fat loss per NFPE.       Nutrition Dx Problem 2        Intervention Goal         Intervention Goal(s) Encourage good PO and supplement intake     Nutrition Intervention        RD Action  NFPE completed, RD to order Boost Plus BID (Boost GC can be substituted), continue healthy heart diet as tolerated     Nutrition Prescription          Diet Prescription Healthy heart   Supplement Prescription Boost Plus BID (Provides 720 kcals, 28 g protein if consumed) - pt prefers chocolate. Boost Glucose Control may be substituted for Boost Plus at this time, due to national shortage of many ONS products. If substituted, each Boost Glucose Control will provide 190 kcal and 16g PRO.   --  Monitor/Evaluation        Monitor Per protocol, PO intake, Supplement intake, Weight     Malnutrition Severity Assessment      Patient meets criteria for : (P) Moderate (non-severe) Malnutrition  Malnutrition Type (last 8 hours)       Malnutrition Severity Assessment       Row Name 02/02/24 1533       Malnutrition Severity Assessment    Malnutrition Type Chronic Disease - Related Malnutrition (P)       Row Name 02/02/24 1533       Unintentional Weight Loss     Unintentional Weight Loss Findings Severe (P)     Unintentional Weight Loss  Weight loss greater than 20% in one year (P)       Row Name 02/02/24 1533       Muscle Loss    Loss of Muscle Mass Findings Moderate (P)     [place] Region Moderate - slight depression (P)     Clavicle Bone Region Moderate - some protrusion in females, visible in males (P)     Acromion Bone Region Moderate - acromion may slightly protrude (P)     Scapular Bone Region Severe - prominent bones, depressions easily visible between ribs, scapula, spine, shoulders (P)     Dorsal Hand Region Moderate - slight depression (P)     Patellar Region Severe - prominent bone, square looking, very little muscle definition (P)     Anterior Thigh Region Severe - line/depression along thigh, obviously thin (P)     Posterior Calf Region Moderate - some roundness, slight firmness (P)       Row Name 02/02/24 1533       Fat Loss    Subcutaneous Fat Loss Findings Moderate (P)     Orbital Region  Moderate -  somewhat  hollowness, slightly dark circles (P)     Upper Arm Region Moderate - some fat tissue, not ample (P)       Row Name 02/02/24 1533       Criteria Met (Must meet criteria for severity in at least 2 of these categories: M Wasting, Fat Loss, Fluid, Secondary Signs, Wt. Status, Intake)    Patient meets criteria for  Moderate (non-severe) Malnutrition (P)                            Electronically signed by:  Verónica Hernandez  02/02/24 13:41 EST

## 2024-02-02 NOTE — OP NOTE
ENDOSCOPIC RETROGRADE CHOLANGIOPANCREATOGRAPHY Procedure Report    Patient Name:  James Rodriguez  YOB: 1985    Date of Surgery:  2/2/2024     Pre-Op Diagnosis:  Bile leak [K83.9]        Procedure/CPT® Codes:      Procedure(s):  ENDOSCOPIC RETROGRADE CHOLANGIOPANCREATOGRAPHY with sphinctorotomy and placement of 10 Fr. x 7cm biliary stent    Staff:  Surgeon(s):  Los Hodgson MD      Anesthesia: General    Description of Procedure:  A description of the procedure as well as risks, benefits and alternative methods were explained to the patient who voiced understanding and signed the corresponding consent form.Specifically risks of post-ERCP pancreatitis, bleeding, perforation, failure to canulate and adverse reaction to sedation were discussed. A physical exam was performed and vital signs were monitored throughout the procedure.    A  film was performed which was normal. With the patient in the semi-prone position, an Olympus side viewing endoscope was placed into the mouth and proceeded through the esophagus, stomach and second portion of the duodenum without difficulty. Limited views of the esophagus and stomach were normal. The ampulla was visualized and appeared normal. A [employer] hydrotome was used to cannulate the ampulla using wire guided technique. Bile was aspirated to ensure this was the duct of interest. Contrast was injected into the bile duct.      The scope was then retroflexed and the fundus was visualized. The procedure was not difficult and there were no immediate complications. There was no blood loss.    Findings:   Partial pancreatogram obtained and was unremarkable.  Cannulation common bile duct mildly difficult as the common duct was only 4 to 5 mm in diameter.  A cholangiogram was obtained.  The intrahepatic biliary tree was unremarkable.  The common bile duct was unremarkable.  There were no filling defects noted.  The cystic duct stump was identified.   Contrast injection showed extravasation into the gallbladder bed consistent with a bile leak.  Therefore a medium sized biliary sphincterotomy was performed in the 12 o'clock position with good flow of bile and contrast noted.  Then the sphincterotome was withdrawn and a 10 French 7 cm stent was passed over the wire into the common bile duct into excellent position with good drainage of bile and contrast noted from the stent.  The stent appeared to cross the cystic duct takeoff.  The patient tolerated procedure well and there were no immediate complications.  There was no blood loss.    Impression:  1.  Bile leak, status post ERCP with biliary sphincterotomy and stent placement    Recommendations:  1.  Monitor for post ERCP complications including pancreatitis, bleeding, and perforation  2.  Advance diet as tolerated  3.  Monitor drain output  4.  Plan repeat ERCP with stent removal in approximately 1 to 2 months  5.  We will see as needed.  Please call if questions.      Los Hodgson MD     Date: 2/2/2024    Time: 12:01 EST

## 2024-02-02 NOTE — CASE MANAGEMENT/SOCIAL WORK
Continued Stay Note  [facility]     Patient Name: James Rodriguez  MRN: [id number]  Today's Date: 2/2/2024    Admit Date: 2/1/2024    Plan: Home current with [facility]. (Will need order)   Discharge Plan       Row Name 02/02/24 1208       Plan    Plan Home current with [facility]. (Will need order)    Provided Post Acute Provider List? N/A    Provided Post Acute Provider Quality & Resource List? N/A    Plan Comments CM talked to the patient's s/o about the readmission questions. Patient is getting an ERCP. D/c barriers: ERCP 2/2/24, IV antibiotics, IVF.               Expected Discharge Date and Time       Expected Discharge Date Expected Discharge Time    Feb 5, 2024           Patti Carmona RN     [address] [address]  Phone: [phone]  Fax: [phone]

## 2024-02-02 NOTE — ANESTHESIA PREPROCEDURE EVALUATION
Anesthesia Evaluation     Patient summary reviewed and Nursing notes reviewed   no history of anesthetic complications:   NPO Solid Status: > 8 hours  NPO Liquid Status: > 2 hours           Airway   Mallampati: II  TM distance: >3 FB  Neck ROM: full  Dental - normal exam     Pulmonary - normal exam   (+) a smoker Current,    ROS comment: FLU +  Cardiovascular - normal exam    ECG reviewed  PT is on anticoagulation therapy    (+) hypertension, valvular problems/murmurs MR, hyperlipidemia      Neuro/Psych  GI/Hepatic/Renal/Endo      Musculoskeletal     Abdominal    Substance History   (+) drug use     OB/GYN          Other        ROS/Med Hx Other: Additional History:  Marfan's, abd pain, N/V, MJ abuse    Echo:  Echocardiogram Findings    Left Ventricle Left ventricular ejection fraction appears to be 61 - 65%.     Normal left ventricular cavity size and wall thickness noted. All left ventricular wall segments contract normally. Left ventricular diastolic function is consistent with (grade I) impaired relaxation.  Right Ventricle Normal right ventricular cavity size, wall thickness, systolic function and septal motion noted.  Left Atrium Normal left atrial size and volume noted.  Right Atrium Normal right atrial cavity size noted. The inferior vena cava is normally sized. Normal IVC inspiratory collapse of greater than 50% noted.  Aortic Valve No aortic valve regurgitation is present. No hemodynamically significant aortic valve stenosis is present. There is a mechanical aortic valve prosthesis present.  Mitral Valve The mitral valve is structurally normal with no significant stenosis present. Mild mitral valve regurgitation is present.  Tricuspid Valve The tricuspid valve is structurally normal with no stenosis present. Trace tricuspid valve regurgitation is present. Estimated right ventricular systolic pressure from tricuspid regurgitation is normal (<35 mmHg).  Pulmonic Valve The pulmonic valve is not well  visualized. No pulmonic valve regurgitation or significant stenosis is present.  Greater Vessels The aortic root not well visualized.  Pericardium The pericardium is normal. There is no evidence of pericardial effusion. .        PSH:  CARDIAC VALVE REPLACEMENT - AVR              Anesthesia Plan    ASA 3     general     (Patient identified; pre-operative vital signs, all relevant labs/studies, complete medical/surgical/anesthetic history, full medication list, full allergy list, and NPO status obtained/reviewed; physical assessment performed; anesthetic options, side effects, potential complications, risks, and benefits discussed; questions answered; written anesthesia consent obtained; patient cleared for procedure; anesthesia machine and equipment checked and functioning)  intravenous induction     Anesthetic plan, risks, benefits, and alternatives have been provided, discussed and informed consent has been obtained with: patient.    Plan discussed with CRNA and CAA.    CODE STATUS:    Code Status (Patient has no pulse and is not breathing): CPR (Attempt to Resuscitate)  Medical Interventions (Patient has pulse or is breathing): Full Support

## 2024-02-02 NOTE — ANESTHESIA POSTPROCEDURE EVALUATION
Patient: James Rodriguez    Procedure Summary       Date: 02/02/24 Room / Location: [facility] ENDOSCOPY 2 / [facility] ENDOSCOPY    Anesthesia Start: 1116 Anesthesia Stop: 1213    Procedure: ENDOSCOPIC RETROGRADE CHOLANGIOPANCREATOGRAPHY with sphinctorotomy and placement of 10 Fr. x 7cm biliary stent Diagnosis:       Bile leak      (Bile leak [K83.9])    Surgeons: Los Hodgson MD Provider: Aleksandra Garcia MD    Anesthesia Type: general ASA Status: 3            Anesthesia Type: general    Vitals  Vitals Value Taken Time   /70 02/02/24 1253   Temp 98 °F (36.7 °C) 02/02/24 1216   Pulse 48 02/02/24 1254   Resp 16 02/02/24 1253   SpO2 91 % 02/02/24 1254   Vitals shown include unfiled device data.        Post Anesthesia Care and Evaluation    Patient location during evaluation: PACU  Patient participation: complete - patient participated  Level of consciousness: awake and alert  Pain management: satisfactory to patient    Airway patency: patent  Anesthetic complications: No anesthetic complications  PONV Status: none  Cardiovascular status: acceptable  Respiratory status: acceptable  Hydration status: acceptable

## 2024-02-02 NOTE — PLAN OF CARE
Goal Outcome Evaluation:              Outcome Evaluation: Pt strict NPO for surgery.  No issues this shift.  Will continue to monitor.

## 2024-02-02 NOTE — PROGRESS NOTES
"Pharmacy dosing service  Anticoagulant  Warfarin     Subjective:    James Rodriguez is a 38 y.o.male being continued on warfarin for Mechanical Aortic Valve.    INR Goal: 2.5 - 3.5  Home medication?: warfarin 7.5 mg PO daily  Bridge Therapy Present?:  Yes, Enoxaparin 70 mg SQ Q12H  Interacting Medications Evaluation (New/Present/Discontinued): none  Additional Contributing Factors: n/a      Assessment/Plan:    Consult to \"start Coumadin on 2/3 because the patient had ERCP on 2/2\". No INR today but yesterday's INR = 2.43. Currently bridging with Lovenox 70 mg q12h, starting now per order. Will order INR for tomorrow AM and dose based on trend.     Continue to monitor and adjust based on INR.         Date            INR            Dose                Objective:  [Ht: 193 cm (76\"); Wt: 69.7 kg (153 lb 9.6 oz); BMI: Body mass index is 18.7 kg/m².]    Lab Results   Component Value Date    ALBUMIN 3.8 02/01/2024     Lab Results   Component Value Date    INR 2.43 02/01/2024    INR 2.40 02/01/2024    INR 1.10 (L) 01/25/2024    PROTIME 24.8 02/01/2024    PROTIME 24.5 02/01/2024    PROTIME 11.9 (L) 01/25/2024     Lab Results   Component Value Date    HGB 12.9 (L) 02/01/2024    HGB 14.6 02/01/2024    HGB 14.1 01/24/2024     Lab Results   Component Value Date    HCT 38.8 02/01/2024    HCT 42.9 02/01/2024    HCT 42.2 01/24/2024       Juliet Rivera, PharmD  02/02/24 17:22 EST     "

## 2024-02-02 NOTE — PROGRESS NOTES
"[facility] MEDICINE SERVICE  DAILY PROGRESS NOTE    NAME: James Rodriguez  : 1985  MRN: [id number]      LOS: 1 day     PROVIDER OF SERVICE: Kody Dukes MD    Chief Complaint: Abdominal pain     SUBJECTIVE     Patient states he feels okay.  Denies abdominal pain, fever, chills.    ERCP done today, 2024.  Gastroenterology following  Lovenox bridging to Coumadin.  Coumadin will be restarted tomorrow.  Patient is a history of aortic valve replacement on Coumadin.      OBJECTIVE   BP 99/65   Pulse 61   Temp 98.1 °F (36.7 °C) (Oral)   Resp 18   Ht 193 cm (76\")   Wt 69.7 kg (153 lb 9.6 oz)   SpO2 95%   BMI 18.70 kg/m²         Scheduled Meds   atorvastatin, 10 mg, Oral, Daily  enoxaparin, 1 mg/kg, Subcutaneous, Q12H  famotidine, 20 mg, Oral, BID AC  Indomethacin, , ,   losartan, 100 mg, Oral, Daily  piperacillin-tazobactam, 3.375 g, Intravenous, Q8H  senna-docusate sodium, 2 tablet, Oral, BID  sertraline, 200 mg, Oral, Daily  sodium chloride, 10 mL, Intravenous, Q12H       PRN Meds     acetaminophen **OR** acetaminophen **OR** acetaminophen    senna-docusate sodium **AND** polyethylene glycol **AND** bisacodyl **AND** bisacodyl    Calcium Replacement - Follow Nurse / BPA Driven Protocol    Indomethacin    Magnesium Standard Dose Replacement - Follow Nurse / BPA Driven Protocol    Morphine    ondansetron ODT **OR** ondansetron    Pharmacy to dose warfarin    Phosphorus Replacement - Follow Nurse / BPA Driven Protocol    Potassium Replacement - Follow Nurse / BPA Driven Protocol    sodium chloride    sodium chloride   Infusions  Pharmacy to dose warfarin,   sodium chloride, 100 mL/hr, Last Rate: 100 mL/hr (24 0855)          EXAM:    General: Not in any acute distress  HEENT: Normocephalic, atraumatic  Neck: Supple, No JVD  CV: Regular rate and rhythm, S1 and S2 are normal, no murmurs/rubs/or gallops  Lungs: Clear to auscultation bilaterally, no rales/rhonchi/wheezes  Abdomen: Soft, " non-distended, non-tender, bowel sounds present  EXT: No edema of lower extremities  Neuro: Cranial nerves II through XII intact  Skin: Intact, no rashes, no lesions, no erythema    Medications reviewed: yes.  Labs reviewed: yes.    Result Review:  I have personally reviewed the results from the time of this admission to 2/2/2024 17:34 EST and agree with these findings:  [x]  Laboratory  []  Microbiology  [x]  Radiology  []  EKG/Telemetry   []  Cardiology/Vascular   []  Pathology  []  Old records  []  Other:      ASSESSMENT/PLAN     Abdominal pain  HIDA scan was positive for bile leak   TAL drain with bile output noted on admit on 2/1  Gastroenterology consulted and evaluation appreciated  ERCP with biliary sphincterotomy and stent placement on 2/2  Monitor for ERCP complications that include pancreatitis, bleeding, perforation  Advance diet as tolerated  Monitor drain output  Repeat ERCP with stent removal and 1-2 months  Zosyn start date February 1    Hypokalemia  Correct and monitor    History of Marfan syndrome  On chronic anticoagulation    Hypertension  Chronic and stable    History aortic valve replacement on Coumadin  Lovenox bridge to Coumadin  Okay to restart Coumadin tomorrow February 3    History of recent cholecystectomy  Patient had laparoscopic subtotal cholecystectomy by Dr. Bran on January 22     Venous thrombosis prophylaxis: Lovenox  Code: Full

## 2024-02-02 NOTE — CASE MANAGEMENT/SOCIAL WORK
Case Management Readmission Assessment Note    Case Management Readmission Assessment (all recorded)       Readmission Interview       Row Name 02/02/24 1205             Readmission Indications    Is this hospitalization related to the prior hospital diagnosis? Yes      What was the reason you were admitted? Last Admission was for abdominal pain and 1/2 of his gall bladder was removed. Patient was seen by Dr. Shant TELLEZ complaining of abd pain and low grade fevers. Drain looked good and CT showed come fluid aound the drain so f/u appt. made for 1 week from then. This admision patient is complainig of abd pain and is getting a ERCP today, 2/2.        Row Name 02/02/24 1205             Recommendation for rehospitalization    Did you speak with your physician prior to coming to the hospital Yes      If yes, what physician did you speak with? Dr. Bran OP clinic      Who recommended you return to the hospital? Specialist      Did you seek care elsewhere prior to coming to the hospital? No        Row Name 02/02/24 1205             Follow up appointment    Do you have a PCP? Yes      Did you have an appointment with PCP/specialist after hospitalization within 7 days? No  has an appointment scheduled 2/6 with PCP. Saw Dr. Bran OP clinic on 1/29.      Did you keep your appointment? Yes        Row Name 02/02/24 1205             Medications    Did you have newly prescribed medications at discharge? No      Are you taking all of you prescribed medications? Yes        Row Name 02/02/24 1205             Discharge Instructions    Did you understand your discharge instructions? Yes      Did your family/caregiver hear your instructions? Yes      Were you told to eat a special diet? No      Were you given a number of someone to call if you had questions or concerns? Yes        Row Name 02/02/24 1205             Index discharge location/services    Where did you go upon discharge? Home with services      Do you have supportive  family or friends in the home? Yes      What services were arranged at discharge? --  HH already current with the patient        Row Name 02/02/24 1204             Discharge Readiness    On a scale of 1-5 (5 being well prepared), how ready were you for discharge 5                    Patti Carmona RN    [address] [address]  Phone: [phone]  Fax: [phone]

## 2024-02-03 LAB
ALBUMIN SERPL-MCNC: 4.2 G/DL (ref 3.5–5.2)
ALBUMIN/GLOB SERPL: 1.6 G/DL
ALP SERPL-CCNC: 82 U/L (ref 39–117)
ALT SERPL W P-5'-P-CCNC: 190 U/L (ref 1–41)
ANION GAP SERPL CALCULATED.3IONS-SCNC: 12 MMOL/L (ref 5–15)
AST SERPL-CCNC: 153 U/L (ref 1–40)
BASOPHILS # BLD AUTO: 0 10*3/MM3 (ref 0–0.2)
BASOPHILS NFR BLD AUTO: 0.2 % (ref 0–1.5)
BILIRUB SERPL-MCNC: 0.4 MG/DL (ref 0–1.2)
BUN SERPL-MCNC: 7 MG/DL (ref 6–20)
BUN/CREAT SERPL: 11.3 (ref 7–25)
CALCIUM SPEC-SCNC: 9.3 MG/DL (ref 8.6–10.5)
CHLORIDE SERPL-SCNC: 100 MMOL/L (ref 98–107)
CO2 SERPL-SCNC: 25 MMOL/L (ref 22–29)
CREAT SERPL-MCNC: 0.62 MG/DL (ref 0.76–1.27)
DEPRECATED RDW RBC AUTO: 41.6 FL (ref 37–54)
EGFRCR SERPLBLD CKD-EPI 2021: 124.7 ML/MIN/1.73
EOSINOPHIL # BLD AUTO: 0 10*3/MM3 (ref 0–0.4)
EOSINOPHIL NFR BLD AUTO: 0 % (ref 0.3–6.2)
ERYTHROCYTE [DISTWIDTH] IN BLOOD BY AUTOMATED COUNT: 13.6 % (ref 12.3–15.4)
GLOBULIN UR ELPH-MCNC: 2.7 GM/DL
GLUCOSE SERPL-MCNC: 122 MG/DL (ref 65–99)
HCT VFR BLD AUTO: 42.9 % (ref 37.5–51)
HGB BLD-MCNC: 14.6 G/DL (ref 13–17.7)
INR PPP: 2.13 (ref 2–3)
LYMPHOCYTES # BLD AUTO: 0.8 10*3/MM3 (ref 0.7–3.1)
LYMPHOCYTES NFR BLD AUTO: 7.9 % (ref 19.6–45.3)
MCH RBC QN AUTO: 29.9 PG (ref 26.6–33)
MCHC RBC AUTO-ENTMCNC: 34 G/DL (ref 31.5–35.7)
MCV RBC AUTO: 87.7 FL (ref 79–97)
MONOCYTES # BLD AUTO: 0.4 10*3/MM3 (ref 0.1–0.9)
MONOCYTES NFR BLD AUTO: 4.2 % (ref 5–12)
NEUTROPHILS NFR BLD AUTO: 8.8 10*3/MM3 (ref 1.7–7)
NEUTROPHILS NFR BLD AUTO: 87.7 % (ref 42.7–76)
NRBC BLD AUTO-RTO: 0 /100 WBC (ref 0–0.2)
PLATELET # BLD AUTO: 289 10*3/MM3 (ref 140–450)
PMV BLD AUTO: 9.4 FL (ref 6–12)
POTASSIUM SERPL-SCNC: 3.4 MMOL/L (ref 3.5–5.2)
POTASSIUM SERPL-SCNC: 3.9 MMOL/L (ref 3.5–5.2)
PROT SERPL-MCNC: 6.9 G/DL (ref 6–8.5)
PROTHROMBIN TIME: 22 SECONDS (ref 19.4–28.5)
RBC # BLD AUTO: 4.89 10*6/MM3 (ref 4.14–5.8)
SODIUM SERPL-SCNC: 137 MMOL/L (ref 136–145)
WBC NRBC COR # BLD AUTO: 10 10*3/MM3 (ref 3.4–10.8)

## 2024-02-03 PROCEDURE — 25010000002 PIPERACILLIN SOD-TAZOBACTAM PER 1 G: Performed by: INTERNAL MEDICINE

## 2024-02-03 PROCEDURE — 25010000002 ENOXAPARIN PER 10 MG: Performed by: INTERNAL MEDICINE

## 2024-02-03 PROCEDURE — 84132 ASSAY OF SERUM POTASSIUM: CPT | Performed by: INTERNAL MEDICINE

## 2024-02-03 PROCEDURE — 85610 PROTHROMBIN TIME: CPT | Performed by: INTERNAL MEDICINE

## 2024-02-03 PROCEDURE — 85025 COMPLETE CBC W/AUTO DIFF WBC: CPT | Performed by: INTERNAL MEDICINE

## 2024-02-03 PROCEDURE — 25810000003 SODIUM CHLORIDE 0.9 % SOLUTION: Performed by: INTERNAL MEDICINE

## 2024-02-03 PROCEDURE — 80053 COMPREHEN METABOLIC PANEL: CPT | Performed by: INTERNAL MEDICINE

## 2024-02-03 RX ORDER — WARFARIN SODIUM 5 MG/1
10 TABLET ORAL
Qty: 2 TABLET | Refills: 0 | Status: COMPLETED | OUTPATIENT
Start: 2024-02-03 | End: 2024-02-03

## 2024-02-03 RX ORDER — OSELTAMIVIR PHOSPHATE 75 MG/1
75 CAPSULE ORAL EVERY 12 HOURS SCHEDULED
Status: DISCONTINUED | OUTPATIENT
Start: 2024-02-03 | End: 2024-02-05 | Stop reason: HOSPADM

## 2024-02-03 RX ORDER — AMOXICILLIN AND CLAVULANATE POTASSIUM 875; 125 MG/1; MG/1
1 TABLET, FILM COATED ORAL EVERY 12 HOURS SCHEDULED
Status: DISCONTINUED | OUTPATIENT
Start: 2024-02-03 | End: 2024-02-05 | Stop reason: HOSPADM

## 2024-02-03 RX ORDER — POTASSIUM CHLORIDE 20 MEQ/1
40 TABLET, EXTENDED RELEASE ORAL EVERY 4 HOURS
Status: COMPLETED | OUTPATIENT
Start: 2024-02-03 | End: 2024-02-03

## 2024-02-03 RX ADMIN — Medication 10 ML: at 09:41

## 2024-02-03 RX ADMIN — OSELTAMIVIR PHOSPHATE 75 MG: 75 CAPSULE ORAL at 20:14

## 2024-02-03 RX ADMIN — ATORVASTATIN CALCIUM 10 MG: 10 TABLET, FILM COATED ORAL at 09:40

## 2024-02-03 RX ADMIN — LOSARTAN POTASSIUM 100 MG: 50 TABLET, FILM COATED ORAL at 09:40

## 2024-02-03 RX ADMIN — WARFARIN SODIUM 10 MG: 5 TABLET ORAL at 09:45

## 2024-02-03 RX ADMIN — FAMOTIDINE 20 MG: 20 TABLET, FILM COATED ORAL at 17:25

## 2024-02-03 RX ADMIN — SODIUM CHLORIDE 100 ML/HR: 9 INJECTION, SOLUTION INTRAVENOUS at 12:03

## 2024-02-03 RX ADMIN — POTASSIUM CHLORIDE 40 MEQ: 1500 TABLET, EXTENDED RELEASE ORAL at 09:40

## 2024-02-03 RX ADMIN — PIPERACILLIN AND TAZOBACTAM 3.38 G: 3; .375 INJECTION, POWDER, FOR SOLUTION INTRAVENOUS at 05:27

## 2024-02-03 RX ADMIN — ENOXAPARIN SODIUM 70 MG: 100 INJECTION SUBCUTANEOUS at 05:27

## 2024-02-03 RX ADMIN — Medication 10 ML: at 20:13

## 2024-02-03 RX ADMIN — SERTRALINE 200 MG: 100 TABLET, FILM COATED ORAL at 09:40

## 2024-02-03 RX ADMIN — POTASSIUM CHLORIDE 40 MEQ: 1500 TABLET, EXTENDED RELEASE ORAL at 05:27

## 2024-02-03 RX ADMIN — PIPERACILLIN AND TAZOBACTAM 3.38 G: 3; .375 INJECTION, POWDER, FOR SOLUTION INTRAVENOUS at 14:25

## 2024-02-03 RX ADMIN — AMOXICILLIN AND CLAVULANATE POTASSIUM 1 TABLET: 875; 125 TABLET, FILM COATED ORAL at 20:13

## 2024-02-03 RX ADMIN — FAMOTIDINE 20 MG: 20 TABLET, FILM COATED ORAL at 09:40

## 2024-02-03 NOTE — CONSULTS
Infectious Diseases Consult Note    Referring Provider: Kody Dukes MD    Reason for Consultation: Antibiotic management    Patient Care Team:  Wally Henderson MD as PCP - General (Internal Medicine)  Chandrika Bran MD as Surgeon (General Surgery)    Chief complaint abdominal pain    Subjective     The patient has been afebrile for the last 24 hours.  The patient is on room air, hemodynamically stable, and is tolerating antimicrobial therapy.    History of present illness:      This is a 38-year-old male who presents to the hospital on 2024 with complaints of bone pain and nausea been going on for several days.  Patient had a recent laparoscopic cholecystectomy on 2024.  Patient was found to have a bile leak and had an ERCP with stent placement and drain placement on 2023.  Denies any current fever or chills, shortness of breath, cough, or urinary symptoms.  Patient did test positive for influenza A.  Main GI symptom is pain without significant nausea vomiting or diarrhea    Review of Systems   Review of Systems   Constitutional: Negative.    HENT: Negative.     Eyes: Negative.    Respiratory: Negative.     Cardiovascular: Negative.    Gastrointestinal: Negative.  Positive for abdominal pain.   Endocrine: Negative.    Genitourinary: Negative.    Musculoskeletal: Negative.    Skin: Negative.    Neurological: Negative.    Psychiatric/Behavioral: Negative.     All other systems reviewed and are negative.      Medications  Facility-Administered Medications Prior to Admission   Medication Dose Route Frequency Provider Last Rate Last Admin    [] famotidine (PEPCID) tablet 20 mg  20 mg Oral BID Abhijeet Floyd MD         Medications Prior to Admission   Medication Sig Dispense Refill Last Dose    [] Enoxaparin Sodium (LOVENOX) 80 MG/0.8ML solution prefilled syringe syringe Inject 0.7 mL under the skin into the appropriate area as directed Every 12 (Twelve) Hours for 7 days.  Indications: Presence of Mechanical Artificial Heart Valve, brige for Monday procedure 9.8 mL 0 1/31/2024    warfarin (COUMADIN) 7.5 MG tablet Take 1 tablet by mouth Every Night.   1/31/2024    acetaminophen (TYLENOL) 500 MG tablet Take 1 tablet by mouth Every 4 (Four) Hours As Needed for Mild Pain, Fever or Headache.       atorvastatin (LIPITOR) 10 MG tablet Take 1 tablet by mouth Daily.       losartan (COZAAR) 100 MG tablet Take 1 tablet by mouth Daily.       sertraline (ZOLOFT) 100 MG tablet Take 2 tablets by mouth Daily.          History  Past Medical History:   Diagnosis Date    History of open heart surgery     1997, 1998    Hyperlipidemia     Hypertension     Marfan's disease      Past Surgical History:   Procedure Laterality Date    CARDIAC VALVE REPLACEMENT      1998    CHOLECYSTECTOMY N/A 1/22/2024    Procedure: CHOLECYSTECTOMY LAPAROSCOPIC WITH DAVINCI ROBOT;  Surgeon: Chandrika Bran MD;  Location: [facility];  Service: Robotics - DaVinci;  Laterality: N/A;       Family History  History reviewed. No pertinent family history.    Social History   reports that he has been smoking cigarettes. He has been smoking an average of .5 packs per day. He has been exposed to tobacco smoke. He has never used smokeless tobacco. He reports that he does not currently use alcohol. He reports current drug use. Drug: Marijuana.    Allergies  Patient has no known allergies.    Objective     Vital Signs   Vital Signs (last 24 hours)         02/02 0700  02/03 0659 02/03 0700  02/03 1726   Most Recent      Temp (°F) 97.5 -  98.4    97.2 -  97.8     97.2 (36.2) 02/03 1607    Heart Rate 43 -  65      67     67 02/03 1303    Resp 12 -  24      18     18 02/03 1607    BP 99/65 -  177/76      111/75     111/75 02/03 0920    SpO2 (%) 92 -  99      92     92 02/03 1303            Physical Exam:  Physical Exam  Vitals and nursing note reviewed.   Constitutional:       General: He is not in acute distress.     Appearance: Normal  appearance. He is well-developed and normal weight. He is not diaphoretic.   HENT:      Head: Normocephalic and atraumatic.   Eyes:      Conjunctiva/sclera: Conjunctivae normal.      Pupils: Pupils are equal, round, and reactive to light.   Cardiovascular:      Rate and Rhythm: Normal rate and regular rhythm.      Heart sounds: Normal heart sounds, S1 normal and S2 normal.   Pulmonary:      Effort: Pulmonary effort is normal. No respiratory distress.      Breath sounds: Normal breath sounds. No stridor. No wheezing or rales.   Abdominal:      General: Bowel sounds are normal. There is no distension.      Palpations: Abdomen is soft. There is no mass.      Tenderness: There is abdominal tenderness. There is no guarding.      Comments: TAL drain with clear liquid   Musculoskeletal:         General: No deformity. Normal range of motion.      Cervical back: Neck supple.   Skin:     General: Skin is warm and dry.      Coloration: Skin is not pale.      Findings: No erythema or rash.   Neurological:      Mental Status: He is alert and oriented to person, place, and time.      Cranial Nerves: No cranial nerve deficit.   Psychiatric:         Mood and Affect: Mood normal.         Microbiology  Microbiology Results (last 10 days)       Procedure Component Value - Date/Time    Respiratory Panel PCR w/COVID-19(SARS-CoV-2) KATHERINE/GAUDENCIO/SARAH/PAD/COR/MICHAEL In-House, NP Swab in UTM/VTM, 2 HR TAT - Swab, Nasopharynx [[id number]]  (Abnormal) Collected: 02/01/24 1810    Lab Status: Final result Specimen: Swab from Nasopharynx Updated: 02/01/24 1914     ADENOVIRUS, PCR Not Detected     Coronavirus 229E Not Detected     Coronavirus HKU1 Not Detected     Coronavirus NL63 Not Detected     Coronavirus OC43 Not Detected     COVID19 Not Detected     Human Metapneumovirus Not Detected     Human Rhinovirus/Enterovirus Not Detected     Influenza A H1 2009 PCR Detected     Influenza B PCR Not Detected     Parainfluenza Virus 1 Not Detected      Parainfluenza Virus 2 Not Detected     Parainfluenza Virus 3 Not Detected     Parainfluenza Virus 4 Not Detected     RSV, PCR Not Detected     Bordetella pertussis pcr Not Detected     Bordetella parapertussis PCR Not Detected     Chlamydophila pneumoniae PCR Not Detected     Mycoplasma pneumo by PCR Not Detected    Narrative:      In the setting of a positive respiratory panel with a viral infection PLUS a negative procalcitonin without other underlying concern for bacterial infection, consider observing off antibiotics or discontinuation of antibiotics and continue supportive care. If the respiratory panel is positive for atypical bacterial infection (Bordetella pertussis, Chlamydophila pneumoniae, or Mycoplasma pneumoniae), consider antibiotic de-escalation to target atypical bacterial infection.    Blood Culture - Blood, Arm, Left [[id number]]  (Normal) Collected: 02/01/24 1037    Lab Status: Preliminary result Specimen: Blood from Arm, Left Updated: 02/03/24 1101     Blood Culture No growth at 2 days    Blood Culture - Blood, Arm, Left [[id number]]  (Normal) Collected: 02/01/24 1034    Lab Status: Preliminary result Specimen: Blood from Arm, Left Updated: 02/03/24 1045     Blood Culture No growth at 2 days            Laboratory  Results from last 7 days   Lab Units 02/03/24  0245   WBC 10*3/mm3 10.00   HEMOGLOBIN g/dL 14.6   HEMATOCRIT % 42.9   PLATELETS 10*3/mm3 289     Results from last 7 days   Lab Units 02/03/24  1208 02/03/24  0245   SODIUM mmol/L  --  137   POTASSIUM mmol/L 3.9 3.4*   CHLORIDE mmol/L  --  100   CO2 mmol/L  --  25.0   BUN mg/dL  --  7   CREATININE mg/dL  --  0.62*   GLUCOSE mg/dL  --  122*   CALCIUM mg/dL  --  9.3     Results from last 7 days   Lab Units 02/03/24  1208 02/03/24  0245   SODIUM mmol/L  --  137   POTASSIUM mmol/L 3.9 3.4*   CHLORIDE mmol/L  --  100   CO2 mmol/L  --  25.0   BUN mg/dL  --  7   CREATININE mg/dL  --  0.62*   GLUCOSE mg/dL  --  122*   CALCIUM mg/dL  --  9.3                    Radiology  Imaging Results (Last 72 Hours)       Procedure Component Value Units Date/Time    FL ercp biliary duct only [[id number]] Collected: 02/02/24 1217     Updated: 02/02/24 1229    Narrative:      FL ERCP BILIARY DUCT ONLY    Date of Exam: 2/2/2024 11:20 AM EST    Indication: ERCP.    Comparison: CT abdomen and pelvis 2/1/2024    Technique:  A series of radiographic digital spot films were obtained in conjunction with a endoscopic catheterization of the biliary ductal system, performed by the gastroenterologist.    Fluoroscopic Time: 1.2 minutes    Number of Images: 5    Findings:  ERCP images demonstrate contrast opacification of common bile duct and the intrahepatic bile ducts which are normal in caliber. There is contrast filling of the cystic duct remnant with extravasation of contrast from the cystic duct stump consistent with   bile leak. Surgical drainage catheter adjacent to the cystic duct. Common bile duct stent placed.      Impression:      Impression:  1. ERCP demonstrates leak from the cystic duct stump.  2. Full details in procedure note.      Electronically Signed: Tye Yancey MD    2/2/2024 12:27 PM EST    Workstation ID: [id number]    NM HIDA SCAN WITHOUT PHARMACOLOGICAL INTERVENTION [[id number]] Collected: 02/01/24 1443     Updated: 02/01/24 1652    Narrative:      NM HIDA SCAN WITHOUT PHARMACOLOGICAL INTERVENTION    Date of Exam: 2/1/2024 1:07 PM EST    Indication: Abdominal pain, upper, recurrent, post cholecystectomy  Evaulate for possible bile leak.    Comparison: CT abdomen pelvis 2/1/2024    Technique: The patient received 5.4 mCi of technetium 99m Choletec intravenously and images were obtained of the abdomen in the anterior projection through 60 minutes. Gallbladder stimulation was not performed.      Findings:  Normal radiopharmaceutical uptake is demonstrated within the liver. There is progression of radiopharmaceutical into the small bowel over the course of examination.  There is radiopharmaceutical opacification of a drainage catheter overlying the right   upper quadrant. There is some progressive radiopharmaceutical uptake within the pelvis thought to be within the urinary bladder. There is some amorphous radiopharmaceutical uptake is seen along the inferior margin of the left hepatic lobe.      Impression:      Impression:  A drainage catheter within the gallbladder fossa in the right upper quadrant of the abdomen contains  radiopharmaceutical, and there is some amorphous radiopharmaceutical along the inferior margin of the left hepatic lobe, consistent with the appearance   of bile leak.      Electronically Signed: Vandana Henry MD    2/1/2024 4:50 PM EST    Workstation ID: [id number]    CT Abdomen Pelvis With Contrast [[id number]] Collected: 02/01/24 1137     Updated: 02/01/24 1148    Narrative:      CT ABDOMEN PELVIS W CONTRAST    Date of Exam: 2/1/2024 11:26 AM EST    Indication: abd pain post surg.    Comparison: 1/29/2024    Technique: Axial CT images were obtained of the abdomen and pelvis following the uneventful intravenous administration of iodinated contrast. Sagittal and coronal reconstructions were performed.  Automated exposure control and iterative reconstruction   methods were used.        Findings:    Lower Chest: Atelectasis and emphysema in the lung bases    Organs: Status post cholecystectomy. Right upper quadrant surgical drain remains in place. Stable approximately 3 x 2 cm fluid collection in the cholecystectomy bed, which now contains a single locule of gas and demonstrates subtle peripheral   enhancement. Liver, spleen, pancreas, adrenal glands unremarkable. Small bilateral renal cysts    Gastrointestinal: No abnormality demonstrated    Pelvis: Small amount of free fluid in the pelvis, new. Urinary bladder unremarkable    Peritoneum/Retroperitoneum: Small amount of perihepatic fluid, new. No pneumoperitoneum. Normal caliber aorta    Bones/Soft Tissues: No  acute bony abnormality      Impression:        1. Stable fluid collection in the cholecystectomy bed, containing slightly less gas than previously. Abscess or contained bile leak are not excluded  2. New small amount of perihepatic and pelvic free fluid. Consider HIDA scan if there is clinical concern for bile leak              Electronically Signed: Simeon Olivera    2/1/2024 11:45 AM EST    Workstation ID: [id number]            Cardiology      Results Review:  I have reviewed all clinical data, test, lab, and imaging results.       Schedule Meds  atorvastatin, 10 mg, Oral, Daily  famotidine, 20 mg, Oral, BID AC  losartan, 100 mg, Oral, Daily  oseltamivir, 75 mg, Oral, Q12H  piperacillin-tazobactam, 3.375 g, Intravenous, Q8H  senna-docusate sodium, 2 tablet, Oral, BID  sertraline, 200 mg, Oral, Daily  sodium chloride, 10 mL, Intravenous, Q12H        Infusion Meds  Pharmacy to dose warfarin,   sodium chloride, 100 mL/hr, Last Rate: 100 mL/hr (02/03/24 1203)        PRN Meds    acetaminophen **OR** acetaminophen **OR** acetaminophen    senna-docusate sodium **AND** polyethylene glycol **AND** bisacodyl **AND** bisacodyl    Calcium Replacement - Follow Nurse / BPA Driven Protocol    Magnesium Standard Dose Replacement - Follow Nurse / BPA Driven Protocol    ondansetron ODT **OR** ondansetron    Pharmacy to dose warfarin    Phosphorus Replacement - Follow Nurse / BPA Driven Protocol    Potassium Replacement - Follow Nurse / BPA Driven Protocol    sodium chloride    sodium chloride      Assessment & Plan       Assessment    Influenza A infection.  Patient is currently on room air and does not have any significant symptoms.  Lower lungs on the CT of the abdomen pelvis did not show any signs of infection    Bile leak after patient had a laparoscopic cholecystectomy on 1/22/2024.  Presented with abdominal pain and nausea.  Patient had an ERCP on 2/2/2024 with a stent placement and drain placement.  Only symptom is abdominal  pain.  Patient has been afebrile with a leukocytosis     History of Marfan syndrome with open heart surgery and valve replacement in 1998.  Current blood cultures have been negative after 2 days    Tobacco abuse    Plan    Continue p.o. Tamiflu 75 mg twice daily for 5 days  Discontinue IV Zosyn  Start p.o. Augmentin 875/125 mg twice daily for 7 days for prophylaxis  Continue supportive care  Case discussed with RN at bedside   Case discussed with hospitalist  Tobacco abuse cessation  Not much more to add from infectious disease standpoint-we will sign off at this time-please call with any questions.  Okay to discontinue droplet precautions since patient has been afebrile for 2 days    Eliana Parra, APRN  02/03/24  17:26 EST    Note is dictated utilizing voice recognition software/Dragon

## 2024-02-03 NOTE — PLAN OF CARE
Goal Outcome Evaluation:  Patient is pleasant. Droplet precautions maintained. IV Zosyn D/C- switched to po Augmentin. Tamiflu started- continue for 5 days. ID consult- abx management, po abx at D/C. No complaints throughout the shift. Will continue to monitor.

## 2024-02-03 NOTE — PROGRESS NOTES
"Pharmacy dosing service  Anticoagulant  Warfarin     Subjective:    James Rodriguez is a 38 y.o.male being continued on warfarin for Mechanical Aortic Valve.    INR Goal: 2 - 3 as this is the patient's goal at their primary care and discussed with Dr Dukes.   Home medication?: warfarin 7.5 mg PO daily  Bridge Therapy Present?:  No enoxaparin bridge stopped as INR is within goal range.   Interacting Medications Evaluation (New/Present/Discontinued): none  Additional Contributing Factors: n/a      Assessment/Plan:    INR trending down likely from missed warfarin doses. Will schedule warfarin 10 mg X 1 dose today.     Continue to monitor and adjust based on INR.         Date 2/3           INR 2.13           Dose 10 mg                Objective:  [Ht: 193 cm (76\"); Wt: 69.1 kg (152 lb 5.4 oz); BMI: Body mass index is 18.54 kg/m².]    Lab Results   Component Value Date    ALBUMIN 4.2 02/03/2024     Lab Results   Component Value Date    INR 2.13 02/03/2024    INR 2.43 02/01/2024    INR 2.40 02/01/2024    PROTIME 22.0 02/03/2024    PROTIME 24.8 02/01/2024    PROTIME 24.5 02/01/2024     Lab Results   Component Value Date    HGB 14.6 02/03/2024    HGB 12.9 (L) 02/01/2024    HGB 14.6 02/01/2024     Lab Results   Component Value Date    HCT 42.9 02/03/2024    HCT 38.8 02/01/2024    HCT 42.9 02/01/2024       Tamera Rey, PharmD  02/03/24 08:35 EST       "

## 2024-02-03 NOTE — PROGRESS NOTES
"[facility] MEDICINE SERVICE  DAILY PROGRESS NOTE    NAME: James Rodriguez  : 1985  MRN: [id number]      LOS: 2 days     PROVIDER OF SERVICE: Kody Dukes MD    Chief Complaint: Abdominal pain     SUBJECTIVE     Patient states he feels better, but states that he did have nausea and vomiting today.  Denies abdominal pain, fever, and chills.    ERCP done on 2024.  Gastroenterology following  Patient is a history of aortic valve replacement on Coumadin.      OBJECTIVE   /75 (BP Location: Right arm, Patient Position: Lying)   Pulse 67   Temp 97.8 °F (36.6 °C) (Oral)   Resp 18   Ht 193 cm (76\")   Wt 69.1 kg (152 lb 5.4 oz)   SpO2 92%   BMI 18.54 kg/m²         Scheduled Meds   atorvastatin, 10 mg, Oral, Daily  famotidine, 20 mg, Oral, BID AC  losartan, 100 mg, Oral, Daily  piperacillin-tazobactam, 3.375 g, Intravenous, Q8H  senna-docusate sodium, 2 tablet, Oral, BID  sertraline, 200 mg, Oral, Daily  sodium chloride, 10 mL, Intravenous, Q12H       PRN Meds     acetaminophen **OR** acetaminophen **OR** acetaminophen    senna-docusate sodium **AND** polyethylene glycol **AND** bisacodyl **AND** bisacodyl    Calcium Replacement - Follow Nurse / BPA Driven Protocol    Magnesium Standard Dose Replacement - Follow Nurse / BPA Driven Protocol    ondansetron ODT **OR** ondansetron    Pharmacy to dose warfarin    Phosphorus Replacement - Follow Nurse / BPA Driven Protocol    Potassium Replacement - Follow Nurse / BPA Driven Protocol    sodium chloride    sodium chloride   Infusions  Pharmacy to dose warfarin,   sodium chloride, 100 mL/hr, Last Rate: 100 mL/hr (24 1203)          EXAM:    General: Not in any acute distress  HEENT: Normocephalic, atraumatic  Neck: Supple, No JVD  CV: Regular rate and rhythm, S1 and S2 are normal, no murmurs/rubs/or gallops  Lungs: Clear to auscultation bilaterally, no rales/rhonchi/wheezes  Abdomen: Soft, non-distended, non-tender, bowel sounds present. TAL " drain with bilious output   EXT: No edema of lower extremities  Neuro: Cranial nerves II through XII intact  Skin: Intact, no rashes, no lesions, no erythema    Medications reviewed: yes.  Labs reviewed: yes.    Result Review:  I have personally reviewed the results from the time of this admission to 2/3/2024 14:07 EST and agree with these findings:  [x]  Laboratory  [x]  Microbiology  [x]  Radiology  []  EKG/Telemetry   []  Cardiology/Vascular   []  Pathology  []  Old records  []  Other:      ASSESSMENT/PLAN     Abdominal pain status post laparoscopic cholecystectomy with cystic duct stump leak  HIDA scan was positive for bile leak   TAL drain with bile output noted on admit on 2/1  Gastroenterology consulted and evaluation appreciated  General surgery consulted and evaluation appreciated  Continue TAL drain now and Surgery will plan on removal in the office; will leave in place for now to allow drainage of residual bile   ERCP with biliary sphincterotomy and stent placement on 2/2  Monitor for ERCP complications that include pancreatitis, bleeding, perforation  Tolerating diet  Monitor drain output  Repeat ERCP with stent removal and 1-2 months  Zosyn start date February 1  Infectious disease consulted for antibiotic management and evaluation appreciated    Influenza  Diagnosed on February 1  Tamiflu started February 3  Resolving  Isolation    Hypokalemia  Correct and monitor    History of Marfan syndrome  On chronic anticoagulation    Hypertension  Chronic and stable    History aortic valve replacement on Coumadin  Coumadin    History of recent cholecystectomy  Patient had laparoscopic subtotal cholecystectomy by Dr. Bran on January 22     Venous thrombosis prophylaxis: Coumadin  Code: Full

## 2024-02-03 NOTE — PROGRESS NOTES
Patient seen and examined.  Status post ERCP with stenting placement.  Informed leak from cystic duct stump.  Reports he feels slightly better but did have some nausea and vomiting today.  Patient is positive for influenza.    Abdomen soft, nondistended, minimally tender to palpation in the upper abdomen  TAL drain with bilious output    38-year-old male status post robotic assisted laparoscopic subtotal cholecystectomy with cystic duct stump leak now status post ERCP with stent placement    - Diet as tolerated  - Continue antibiotics; will plan to discharge patient on a course of p.o. antibiotics when ready for discharge  - Continue TAL drain now and will plan on removal in the office; will leave in place for now to allow drainage of residual bile  - When patient is otherwise medically stable for discharge she is okay for discharge from surgical standpoint    Chandrika Bran MD  General Surgery

## 2024-02-03 NOTE — PROGRESS NOTES
Patient seen and examined.  Feeling much better.  Drain is decreasing in output and lightening up in color.    No acute distress, alert  Nonlabored respirations  Abdomen soft, minimally tender to palpation  TAL drain with light green/yellow output, less dark than previous day    38-year-old male status post robotic assisted laparoscopic subtotal cholecystectomy with cystic duct stump leak now status post ERCP with stent placement.  Doing well.     - Diet as tolerated  - Continue antibiotics and plan to discharge patient on a course of p.o. antibiotics when ready for discharge; infectious disease has been consulted  - Continue TAL drain now and will plan on removal in the office; will leave in place for now to allow drainage of residual bile  - When patient is otherwise medically stable for discharge she is okay for discharge from surgical standpoint    Chandrika Bran MD  General Surgery

## 2024-02-03 NOTE — PLAN OF CARE
Problem: Adult Inpatient Plan of Care  Goal: Plan of Care Review  Outcome: Ongoing, Progressing   Goal Outcome Evaluation:     Pt rested well overnight, c/o nausea at start of shift, call placed to MD & rcvd 1x dose of compazine with relief noted. IV abx given per MAR. VSS. Pt educated on current plan of care, verbalized understanding.

## 2024-02-04 LAB
ALBUMIN SERPL-MCNC: 3.8 G/DL (ref 3.5–5.2)
ALBUMIN/GLOB SERPL: 1.4 G/DL
ALP SERPL-CCNC: 75 U/L (ref 39–117)
ALT SERPL W P-5'-P-CCNC: 168 U/L (ref 1–41)
ANION GAP SERPL CALCULATED.3IONS-SCNC: 10 MMOL/L (ref 5–15)
AST SERPL-CCNC: 93 U/L (ref 1–40)
BASOPHILS # BLD AUTO: 0 10*3/MM3 (ref 0–0.2)
BASOPHILS NFR BLD AUTO: 0.5 % (ref 0–1.5)
BILIRUB SERPL-MCNC: 0.3 MG/DL (ref 0–1.2)
BUN SERPL-MCNC: 8 MG/DL (ref 6–20)
BUN/CREAT SERPL: 14 (ref 7–25)
CALCIUM SPEC-SCNC: 9.3 MG/DL (ref 8.6–10.5)
CHLORIDE SERPL-SCNC: 101 MMOL/L (ref 98–107)
CO2 SERPL-SCNC: 25 MMOL/L (ref 22–29)
CREAT SERPL-MCNC: 0.57 MG/DL (ref 0.76–1.27)
DEPRECATED RDW RBC AUTO: 42.4 FL (ref 37–54)
EGFRCR SERPLBLD CKD-EPI 2021: 127.9 ML/MIN/1.73
EOSINOPHIL # BLD AUTO: 0.3 10*3/MM3 (ref 0–0.4)
EOSINOPHIL NFR BLD AUTO: 3.5 % (ref 0.3–6.2)
ERYTHROCYTE [DISTWIDTH] IN BLOOD BY AUTOMATED COUNT: 13.9 % (ref 12.3–15.4)
GLOBULIN UR ELPH-MCNC: 2.7 GM/DL
GLUCOSE SERPL-MCNC: 107 MG/DL (ref 65–99)
HCT VFR BLD AUTO: 40.3 % (ref 37.5–51)
HGB BLD-MCNC: 13.8 G/DL (ref 13–17.7)
INR PPP: 2.54 (ref 2–3)
LYMPHOCYTES # BLD AUTO: 1.8 10*3/MM3 (ref 0.7–3.1)
LYMPHOCYTES NFR BLD AUTO: 20.9 % (ref 19.6–45.3)
MCH RBC QN AUTO: 30.1 PG (ref 26.6–33)
MCHC RBC AUTO-ENTMCNC: 34.3 G/DL (ref 31.5–35.7)
MCV RBC AUTO: 87.8 FL (ref 79–97)
MONOCYTES # BLD AUTO: 0.6 10*3/MM3 (ref 0.1–0.9)
MONOCYTES NFR BLD AUTO: 7.6 % (ref 5–12)
NEUTROPHILS NFR BLD AUTO: 5.8 10*3/MM3 (ref 1.7–7)
NEUTROPHILS NFR BLD AUTO: 67.5 % (ref 42.7–76)
NRBC BLD AUTO-RTO: 0.1 /100 WBC (ref 0–0.2)
PLATELET # BLD AUTO: 282 10*3/MM3 (ref 140–450)
PMV BLD AUTO: 9.4 FL (ref 6–12)
POTASSIUM SERPL-SCNC: 3.7 MMOL/L (ref 3.5–5.2)
PROT SERPL-MCNC: 6.5 G/DL (ref 6–8.5)
PROTHROMBIN TIME: 25.8 SECONDS (ref 19.4–28.5)
RBC # BLD AUTO: 4.59 10*6/MM3 (ref 4.14–5.8)
SODIUM SERPL-SCNC: 136 MMOL/L (ref 136–145)
WBC NRBC COR # BLD AUTO: 8.5 10*3/MM3 (ref 3.4–10.8)

## 2024-02-04 PROCEDURE — 25810000003 SODIUM CHLORIDE 0.9 % SOLUTION: Performed by: INTERNAL MEDICINE

## 2024-02-04 PROCEDURE — 85610 PROTHROMBIN TIME: CPT | Performed by: INTERNAL MEDICINE

## 2024-02-04 PROCEDURE — 80053 COMPREHEN METABOLIC PANEL: CPT | Performed by: INTERNAL MEDICINE

## 2024-02-04 PROCEDURE — 85025 COMPLETE CBC W/AUTO DIFF WBC: CPT | Performed by: INTERNAL MEDICINE

## 2024-02-04 RX ADMIN — ATORVASTATIN CALCIUM 10 MG: 10 TABLET, FILM COATED ORAL at 08:55

## 2024-02-04 RX ADMIN — AMOXICILLIN AND CLAVULANATE POTASSIUM 1 TABLET: 875; 125 TABLET, FILM COATED ORAL at 08:55

## 2024-02-04 RX ADMIN — SODIUM CHLORIDE 100 ML/HR: 9 INJECTION, SOLUTION INTRAVENOUS at 12:51

## 2024-02-04 RX ADMIN — FAMOTIDINE 20 MG: 20 TABLET, FILM COATED ORAL at 17:52

## 2024-02-04 RX ADMIN — FAMOTIDINE 20 MG: 20 TABLET, FILM COATED ORAL at 08:55

## 2024-02-04 RX ADMIN — SERTRALINE 200 MG: 100 TABLET, FILM COATED ORAL at 08:55

## 2024-02-04 RX ADMIN — WARFARIN SODIUM 7.5 MG: 5 TABLET ORAL at 17:52

## 2024-02-04 RX ADMIN — OSELTAMIVIR PHOSPHATE 75 MG: 75 CAPSULE ORAL at 08:58

## 2024-02-04 RX ADMIN — Medication 10 ML: at 08:55

## 2024-02-04 RX ADMIN — LOSARTAN POTASSIUM 100 MG: 50 TABLET, FILM COATED ORAL at 08:55

## 2024-02-04 RX ADMIN — OSELTAMIVIR PHOSPHATE 75 MG: 75 CAPSULE ORAL at 20:13

## 2024-02-04 RX ADMIN — Medication 10 ML: at 20:13

## 2024-02-04 RX ADMIN — SODIUM CHLORIDE 100 ML/HR: 9 INJECTION, SOLUTION INTRAVENOUS at 01:56

## 2024-02-04 RX ADMIN — AMOXICILLIN AND CLAVULANATE POTASSIUM 1 TABLET: 875; 125 TABLET, FILM COATED ORAL at 20:13

## 2024-02-04 NOTE — PLAN OF CARE
Goal Outcome Evaluation:   Pt denies any pain or discomfort and no s/s of pain noted.  Dressing changed completed to TAL insertion site. Will continue current plan of care

## 2024-02-04 NOTE — PROGRESS NOTES
"Pharmacy dosing service  Anticoagulant  Warfarin     Subjective:    James Rodriguez is a 38 y.o.male being continued on warfarin for Mechanical Aortic Valve.    INR Goal: 2 - 3 as this is the patient's goal at their primary care and discussed with Dr Dukes.   Home medication?: warfarin 7.5 mg PO daily  Bridge Therapy Present?:  No enoxaparin bridge stopped as INR is within goal range.   Interacting Medications Evaluation (New/Present/Discontinued): Augmentin (may increase INR)  Additional Contributing Factors: n/a      Assessment/Plan:    INR therapeutic. Will resume home dose of warfarin 7.5 mg daily.     Continue to monitor and adjust based on INR.         Date 2/3 2/4          INR 2.13 2.54          Dose 10 mg  7.5 mg              Objective:  [Ht: 193 cm (76\"); Wt: 71.2 kg (156 lb 15.5 oz); BMI: Body mass index is 19.11 kg/m².]    Lab Results   Component Value Date    ALBUMIN 3.8 02/04/2024     Lab Results   Component Value Date    INR 2.54 02/04/2024    INR 2.13 02/03/2024    INR 2.43 02/01/2024    PROTIME 25.8 02/04/2024    PROTIME 22.0 02/03/2024    PROTIME 24.8 02/01/2024     Lab Results   Component Value Date    HGB 13.8 02/04/2024    HGB 14.6 02/03/2024    HGB 12.9 (L) 02/01/2024     Lab Results   Component Value Date    HCT 40.3 02/04/2024    HCT 42.9 02/03/2024    HCT 38.8 02/01/2024       Tamera Rey, PharmD  02/04/24 09:24 EST         "

## 2024-02-04 NOTE — PLAN OF CARE
Goal Outcome Evaluation:  Patient is pleasant. Droplet precautions maintained. PO Augmentin continued. Tamiflu continued for 4 days. No complaints throughout the shift. Will continue to monitor.

## 2024-02-04 NOTE — PROGRESS NOTES
"[facility] MEDICINE SERVICE  DAILY PROGRESS NOTE    NAME: James Rodriguez  : 1985  MRN: [id number]      LOS: 3 days     PROVIDER OF SERVICE: Kody Dukes MD    Chief Complaint: Abdominal pain     SUBJECTIVE     Patient states he feels fine.   Denies abdominal pain, fever, and chills.    ERCP done on 2024.  Gastroenterology following  Patient is a history of aortic valve replacement on Coumadin.      OBJECTIVE   BP 99/62 (BP Location: Right arm, Patient Position: Lying)   Pulse 56   Temp 97.4 °F (36.3 °C) (Oral)   Resp 16   Ht 193 cm (76\")   Wt 71.2 kg (156 lb 15.5 oz)   SpO2 96%   BMI 19.11 kg/m²         Scheduled Meds   amoxicillin-clavulanate, 1 tablet, Oral, Q12H  atorvastatin, 10 mg, Oral, Daily  famotidine, 20 mg, Oral, BID AC  losartan, 100 mg, Oral, Daily  oseltamivir, 75 mg, Oral, Q12H  senna-docusate sodium, 2 tablet, Oral, BID  sertraline, 200 mg, Oral, Daily  sodium chloride, 10 mL, Intravenous, Q12H  warfarin, 7.5 mg, Oral, Daily       PRN Meds     acetaminophen **OR** acetaminophen **OR** acetaminophen    senna-docusate sodium **AND** polyethylene glycol **AND** bisacodyl **AND** bisacodyl    Calcium Replacement - Follow Nurse / BPA Driven Protocol    Magnesium Standard Dose Replacement - Follow Nurse / BPA Driven Protocol    ondansetron ODT **OR** ondansetron    Pharmacy to dose warfarin    Phosphorus Replacement - Follow Nurse / BPA Driven Protocol    Potassium Replacement - Follow Nurse / BPA Driven Protocol    sodium chloride    sodium chloride   Infusions  Pharmacy to dose warfarin,   sodium chloride, 100 mL/hr, Last Rate: 100 mL/hr (24 1251)          EXAM:    General: Not in any acute distress  HEENT: Normocephalic, atraumatic  Neck: Supple, No JVD  CV: Regular rate and rhythm, S1 and S2 are normal, no murmurs/rubs/or gallops  Lungs: Clear to auscultation bilaterally, no rales/rhonchi/wheezes  Abdomen: Soft, non-distended, non-tender, bowel sounds present. " TAL drain with bilious output   EXT: No edema of lower extremities  Neuro: Cranial nerves II through XII intact  Skin: Intact, no rashes, no lesions, no erythema    Medications reviewed: yes.  Labs reviewed: yes.    Result Review:  I have personally reviewed the results from the time of this admission to 2/4/2024 13:32 EST and agree with these findings:  [x]  Laboratory  [x]  Microbiology  [x]  Radiology  []  EKG/Telemetry   []  Cardiology/Vascular   []  Pathology  []  Old records  []  Other:      ASSESSMENT/PLAN     Abdominal pain status post laparoscopic cholecystectomy with cystic duct stump leak  HIDA scan was positive for bile leak   TAL drain with bile output noted on admit on 2/1  Gastroenterology consulted and evaluation appreciated  General surgery consulted and evaluation appreciated  Continue TAL drain now and Surgery will plan on removal in the office; will leave in place for now to allow drainage of residual bile   ERCP with biliary sphincterotomy and stent placement on 2/2  Monitor for ERCP complications that include pancreatitis, bleeding, perforation  Tolerating diet  Monitor drain output  Repeat ERCP with stent removal and 1-2 months  Zosyn start date 2/1 and stop date 2/3   Augmentin start date  2/3  Augmentin 875/125 mg twice daily for 7 days for prophylaxis   Infectious disease consulted for antibiotic management and evaluation appreciated    Influenza  Diagnosed on February 1  Tamiflu started February 3  Continue p.o. Tamiflu 75 mg twice daily for 5 days   Resolving  Isolation ok to stop    Hypokalemia  Correct and monitor    History of Marfan syndrome  On chronic anticoagulation    Hypertension  Chronic and stable    History aortic valve replacement on Coumadin  Coumadin    History of recent cholecystectomy  Patient had laparoscopic subtotal cholecystectomy by Dr. Bran on January 22     Venous thrombosis prophylaxis: Coumadin  Code: Full

## 2024-02-05 ENCOUNTER — READMISSION MANAGEMENT (OUTPATIENT)
Dept: CALL CENTER | Facility: HOSPITAL | Age: 39
End: 2024-02-05
Payer: MEDICARE

## 2024-02-05 ENCOUNTER — TELEPHONE (OUTPATIENT)
Dept: SURGERY | Facility: CLINIC | Age: 39
End: 2024-02-05

## 2024-02-05 VITALS
SYSTOLIC BLOOD PRESSURE: 106 MMHG | BODY MASS INDEX: 19.11 KG/M2 | DIASTOLIC BLOOD PRESSURE: 74 MMHG | WEIGHT: 156.97 LBS | HEART RATE: 67 BPM | TEMPERATURE: 98.2 F | RESPIRATION RATE: 16 BRPM | HEIGHT: 76 IN | OXYGEN SATURATION: 93 %

## 2024-02-05 PROBLEM — K83.9 BILE LEAK: Status: RESOLVED | Noted: 2024-02-01 | Resolved: 2024-02-05

## 2024-02-05 PROBLEM — E44.0 MODERATE MALNUTRITION: Status: ACTIVE | Noted: 2024-02-05

## 2024-02-05 PROBLEM — R10.9 ABDOMINAL PAIN: Status: RESOLVED | Noted: 2024-02-01 | Resolved: 2024-02-05

## 2024-02-05 LAB
ALBUMIN SERPL-MCNC: 3.9 G/DL (ref 3.5–5.2)
ALBUMIN/GLOB SERPL: 1.3 G/DL
ALP SERPL-CCNC: 76 U/L (ref 39–117)
ALT SERPL W P-5'-P-CCNC: 140 U/L (ref 1–41)
ANION GAP SERPL CALCULATED.3IONS-SCNC: 11 MMOL/L (ref 5–15)
AST SERPL-CCNC: 62 U/L (ref 1–40)
BASOPHILS # BLD AUTO: 0 10*3/MM3 (ref 0–0.2)
BASOPHILS NFR BLD AUTO: 0.5 % (ref 0–1.5)
BILIRUB SERPL-MCNC: 0.3 MG/DL (ref 0–1.2)
BUN SERPL-MCNC: 11 MG/DL (ref 6–20)
BUN/CREAT SERPL: 15.3 (ref 7–25)
CALCIUM SPEC-SCNC: 9.7 MG/DL (ref 8.6–10.5)
CHLORIDE SERPL-SCNC: 100 MMOL/L (ref 98–107)
CO2 SERPL-SCNC: 23 MMOL/L (ref 22–29)
CREAT SERPL-MCNC: 0.72 MG/DL (ref 0.76–1.27)
DEPRECATED RDW RBC AUTO: 44.6 FL (ref 37–54)
EGFRCR SERPLBLD CKD-EPI 2021: 119.2 ML/MIN/1.73
EOSINOPHIL # BLD AUTO: 0.5 10*3/MM3 (ref 0–0.4)
EOSINOPHIL NFR BLD AUTO: 4.8 % (ref 0.3–6.2)
ERYTHROCYTE [DISTWIDTH] IN BLOOD BY AUTOMATED COUNT: 13.9 % (ref 12.3–15.4)
GLOBULIN UR ELPH-MCNC: 2.9 GM/DL
GLUCOSE SERPL-MCNC: 86 MG/DL (ref 65–99)
HCT VFR BLD AUTO: 41.7 % (ref 37.5–51)
HGB BLD-MCNC: 14 G/DL (ref 13–17.7)
INR PPP: 2.43 (ref 2–3)
LYMPHOCYTES # BLD AUTO: 1.9 10*3/MM3 (ref 0.7–3.1)
LYMPHOCYTES NFR BLD AUTO: 20.5 % (ref 19.6–45.3)
MCH RBC QN AUTO: 29.3 PG (ref 26.6–33)
MCHC RBC AUTO-ENTMCNC: 33.6 G/DL (ref 31.5–35.7)
MCV RBC AUTO: 87.3 FL (ref 79–97)
MONOCYTES # BLD AUTO: 0.7 10*3/MM3 (ref 0.1–0.9)
MONOCYTES NFR BLD AUTO: 7.6 % (ref 5–12)
NEUTROPHILS NFR BLD AUTO: 6.2 10*3/MM3 (ref 1.7–7)
NEUTROPHILS NFR BLD AUTO: 66.6 % (ref 42.7–76)
NRBC BLD AUTO-RTO: 0.1 /100 WBC (ref 0–0.2)
PLATELET # BLD AUTO: 291 10*3/MM3 (ref 140–450)
PMV BLD AUTO: 9.8 FL (ref 6–12)
POTASSIUM SERPL-SCNC: 4.3 MMOL/L (ref 3.5–5.2)
PROT SERPL-MCNC: 6.8 G/DL (ref 6–8.5)
PROTHROMBIN TIME: 24.8 SECONDS (ref 19.4–28.5)
RBC # BLD AUTO: 4.77 10*6/MM3 (ref 4.14–5.8)
SODIUM SERPL-SCNC: 134 MMOL/L (ref 136–145)
WBC NRBC COR # BLD AUTO: 9.3 10*3/MM3 (ref 3.4–10.8)

## 2024-02-05 PROCEDURE — 85025 COMPLETE CBC W/AUTO DIFF WBC: CPT | Performed by: INTERNAL MEDICINE

## 2024-02-05 PROCEDURE — 85610 PROTHROMBIN TIME: CPT | Performed by: INTERNAL MEDICINE

## 2024-02-05 PROCEDURE — 80053 COMPREHEN METABOLIC PANEL: CPT | Performed by: INTERNAL MEDICINE

## 2024-02-05 RX ORDER — AMOXICILLIN AND CLAVULANATE POTASSIUM 875; 125 MG/1; MG/1
1 TABLET, FILM COATED ORAL EVERY 12 HOURS SCHEDULED
Qty: 10 TABLET | Refills: 0 | Status: SHIPPED | OUTPATIENT
Start: 2024-02-05 | End: 2024-02-10

## 2024-02-05 RX ORDER — OSELTAMIVIR PHOSPHATE 75 MG/1
75 CAPSULE ORAL EVERY 12 HOURS SCHEDULED
Qty: 6 CAPSULE | Refills: 0 | Status: SHIPPED | OUTPATIENT
Start: 2024-02-05 | End: 2024-02-08

## 2024-02-05 RX ADMIN — LOSARTAN POTASSIUM 100 MG: 50 TABLET, FILM COATED ORAL at 08:32

## 2024-02-05 RX ADMIN — SERTRALINE 200 MG: 100 TABLET, FILM COATED ORAL at 08:32

## 2024-02-05 RX ADMIN — FAMOTIDINE 20 MG: 20 TABLET, FILM COATED ORAL at 08:32

## 2024-02-05 RX ADMIN — AMOXICILLIN AND CLAVULANATE POTASSIUM 1 TABLET: 875; 125 TABLET, FILM COATED ORAL at 08:33

## 2024-02-05 RX ADMIN — Medication 10 ML: at 08:31

## 2024-02-05 RX ADMIN — OSELTAMIVIR PHOSPHATE 75 MG: 75 CAPSULE ORAL at 08:33

## 2024-02-05 RX ADMIN — ATORVASTATIN CALCIUM 10 MG: 10 TABLET, FILM COATED ORAL at 08:32

## 2024-02-05 NOTE — PLAN OF CARE
Goal Outcome Evaluation:  Plan of Care Reviewed With: patient           Outcome Evaluation: pt abed resting with no concerns noted this shift; remains on oral abx; up ad yann; continue to monitor

## 2024-02-05 NOTE — CASE MANAGEMENT/SOCIAL WORK
Continued Stay Note  RIAZ Torres     Patient Name: James Rodriguez  MRN: [id number]  Today's Date: 2/5/2024    Admit Date: 2/1/2024    Plan: Return home with [facility] (current, RYAN order in).   Discharge Plan       Row Name 02/05/24 1205       Plan    Plan Return home with [facility] (current, RYAN order in).    Patient/Family in Agreement with Plan yes    Plan Comments CM met with pt at bedside and provided copy of IMM letter. Pt on PO abx. TAL drain remains in place. Will need to follow up with Dr Bran after d/c.             Megan Naegele, LYNN     Office Phone: [phone]  Office Cell: [phone]

## 2024-02-05 NOTE — OUTREACH NOTE
Prep Survey      Flowsheet Row Responses   [facility]? [facility]   Is LACE score < 7 ? No   Eligibility Readm Mgmt   Discharge diagnosis Bile leak   Does the patient have one of the following disease processes/diagnoses(primary or secondary)? Other   Does the patient have Home health ordered? Yes   What is the Home health agency?  [facility]   Is there a DME ordered? No   Prep survey completed? Yes            Екатерина FOSTER - Registered Nurse

## 2024-02-05 NOTE — PLAN OF CARE
Goal Outcome Evaluation: Plan is for patient to return home today.

## 2024-02-05 NOTE — CASE MANAGEMENT/SOCIAL WORK
Bedside shift change report given to Rosy King RN (oncoming nurse) by Cleveland Pruitt RN (offgoing nurse). Report included the following information SBAR, Kardex, Intake/Output, MAR, Recent Results and Cardiac Rhythm A Fib. Bedside shift change report given to Crystal Santana RN (oncoming nurse) by Flor Mercado RN (offgoing nurse). Report included the following information SBAR, Kardex, Intake/Output, MAR, Recent Results and Cardiac Rhythm Sinus Tach. Case Management Discharge Note      Final Note: Home w/ [facility].    Selected Continued Care - Discharged on 2/5/2024 Admission date: 2/1/2024 - Discharge disposition: Home or Self Care      Home Medical Care Coordination complete.      Service Provider Selected Services Address Phone Fax Patient Preferred    [facility] ,  Home Nursing [facility], [facility] [address] [id number] [phone] --             Transportation Services  Private: Car    Final Discharge Disposition Code: 06 - home with home health care

## 2024-02-05 NOTE — TELEPHONE ENCOUNTER
Alea called the office on [place], he did not have anyone on his release to call on his behalf. I asked her to have the patient call back directly.

## 2024-02-05 NOTE — DISCHARGE SUMMARY
[facility] Medicine Services  Discharge Summary    Date of Service: 24  Patient Name: James Rodriguez  : 1985  MRN: [id number]    Date of Admission: 2024  Date of Discharge:  24  Primary Care Physician: Wally Henderson MD    Discharge Diagnosis: Abdominal pain status post laparoscopic cholecystectomy with cystic duct stump leak     Presenting Problem:   Abdominal pain [R10.9]  Generalized abdominal pain [R10.84]  Bile leak [K83.9]    Active and Resolved Hospital Problems:  Active Hospital Problems    Diagnosis POA    Moderate malnutrition [E44.0] Yes      Resolved Hospital Problems    Diagnosis POA    **Abdominal pain [R10.9] Yes    Bile leak [K83.9] Yes            As per the HPI of the H&P:   James Rodriguez is a 38 y.o. male with past medical history of Marfan syndrome, hypertension, chronic anticoagulation, and recent cholecystectomy who presented to [facility] on 2024 complaining of worsening abdominal pain and nausea for 4 days.  Patient had laparoscopic subtotal cholecystectomy by Dr. Bran on .  He was bridged with Lovenox/warfarin and sent home with drain.  Patient states he was recovering well but 4 days ago started having worsening abdominal pain with some nausea.  No diarrhea, fever, chills, or dysuria.  Drain with dark-colored output noted which is changed from previous per the patient.  In the ER, HIDA scan positive for bile leak.  General surgery consulted and recommending GI evaluation.       Hospital Course:   The patient was admitted with abdominal pain.  He was status post laparoscopic cholecystectomy and was found to have a cystic duct stump leak.  Drain in place with small mount of fluid today.  Follow-up with general surgery within 7 days of discharge.  Continue with the TAL drain and surgery plan on removal in the office.  Draining residual bile.  Tolerating diet.  Medically optimized for discharge home with appropriate  follow-up with general surgery and primary care physician within 7 days of discharge.  Repeat ERCP with stent removal in 1-2 months.  Received antibiotics.  Will be discharged with Augmentin.  Will also be discharged with Tamiflu for influenza.  Patient was seen and examined at bedside today, February 5, 2024.  States he feels well.  Denies fever, chills, cough, nausea, vomit, chest pain, shortness of breath, and abdominal pain.      Assessment and plan:  Abdominal pain status post laparoscopic cholecystectomy with cystic duct stump leak  HIDA scan was positive for bile leak   TAL drain with bile output noted on admit on 2/1  Gastroenterology consulted and evaluation appreciated  General surgery consulted and evaluation appreciated  Continue TAL drain now and Surgery will plan on removal in the office; will leave in place for now to allow drainage of residual bile   ERCP with biliary sphincterotomy and stent placement on 2/2  Monitor for ERCP complications that include pancreatitis, bleeding, perforation  Tolerating diet  Monitor drain output  Repeat ERCP with stent removal and 1-2 months  Zosyn start date 2/1 and stop date 2/3   Augmentin start date 2/3; prescribed  Augmentin 875/125 mg twice daily for 7 days for prophylaxis   Infectious disease consulted for antibiotic management and evaluation appreciated     Influenza  Diagnosed on February 1  Tamiflu started February 3; prescribed  Continue p.o. Tamiflu 75 mg twice daily for 5 days   Resolving  Isolation ok to stop     Hypokalemia  Correct and monitor     History of Marfan syndrome  On chronic anticoagulation     Hypertension  Chronic and stable     History aortic valve replacement on Coumadin  Coumadin     History of recent cholecystectomy  Patient had laparoscopic subtotal cholecystectomy by Dr. Bran on January 22          Discharge Exam    /74 (BP Location: Right arm, Patient Position: Lying)   Pulse 67   Temp 98.2 °F (36.8 °C) (Oral)   Resp 16    "Ht 193 cm (76\")   Wt 71.2 kg (156 lb 15.5 oz)   SpO2 93%   BMI 19.11 kg/m²     General: Not in any acute distress  HEENT: Normocephalic, atraumatic  Neck: Supple, No JVD  CV: Regular rate and rhythm, S1 and S2 are normal, no murmurs/rubs/or gallops  Lungs: Clear to auscultation bilaterally, no rales/rhonchi/wheezes  Abdomen: Soft, non-distended, non-tender, bowel sounds present.  TAL drain with bilious output .  EXT: No edema of lower extremities  Neuro: Cranial nerves II through XII intact  Skin: Intact, no rashes, no lesions, no erythema    Discharge Medications:     Discharge Medications        New Medications        Instructions Start Date   amoxicillin-clavulanate 875-125 MG per tablet  Commonly known as: AUGMENTIN   1 tablet, Oral, Every 12 Hours Scheduled      oseltamivir 75 MG capsule  Commonly known as: TAMIFLU   75 mg, Oral, Every 12 Hours Scheduled             Continue These Medications        Instructions Start Date   acetaminophen 500 MG tablet  Commonly known as: TYLENOL   500 mg, Oral, Every 4 Hours PRN      atorvastatin 10 MG tablet  Commonly known as: LIPITOR   10 mg, Oral, Daily      losartan 100 MG tablet  Commonly known as: COZAAR   100 mg, Oral, Daily      sertraline 100 MG tablet  Commonly known as: ZOLOFT   200 mg, Oral, Daily      warfarin 7.5 MG tablet  Commonly known as: COUMADIN   7.5 mg, Oral, Nightly             Stop These Medications      Enoxaparin Sodium 80 MG/0.8ML solution prefilled syringe syringe  Commonly known as: LOVENOX                 Diet:  Hospital:  Diet Order   Procedures    Diet: Cardiac Diets; Healthy Heart (2-3 Na+); Texture: Regular Texture (IDDSI 7); Fluid Consistency: Thin (IDDSI 0)       Discharge Disposition:  Home      Discharge Activity:   As tolerated    CODE STATUS:  Code Status and Medical Interventions:   Ordered at: 02/01/24 5130     Code Status (Patient has no pulse and is not breathing):    CPR (Attempt to Resuscitate)     Medical Interventions (Patient " has pulse or is breathing):    Full Support         No future appointments.      Time spent on Discharge including face to face service:  >30 minutes    Signature: Electronically signed by Kody Dukes MD, 02/05/24, 13:11 EST.  [facility] Hospitalist Team

## 2024-02-05 NOTE — PROGRESS NOTES
Nutrition Services    Patient Name: James Rodriguez  YOB: 1985  MRN: [id number]  Admission date: 2/1/2024    PROGRESS NOTE      Encounter Information: Checking on for PO intake. Pt dx with moderate malnutrition 2/2.        PO Diet: Diet: Cardiac Diets; Healthy Heart (2-3 Na+); Texture: Regular Texture (IDDSI 7); Fluid Consistency: Thin (IDDSI 0)   PO Supplements: Boost Plus BID (Provides 720 kcals, 28 g protein if consumed) - pt prefers chocolate.  Boost Glucose Control may be substituted for Boost Plus at this time, due to national shortage of many ONS products. If substituted, each Boost Glucose Control will provide 190 kcal and 16g PRO.     PO Intake:  95% PO intake since last assessment 2/2.        Current nutrition support: --   Nutrition support review: --       Labs (reviewed below): Reviewed. Management per attending.        GI Function:  Last documented BM 2/4.       Nutrition Intervention Updates: Encourage continued good PO intake.     RD to continue to monitor. Continue healthy heart diet as tolerated.       Results from last 7 days   Lab Units 02/05/24  0021 02/04/24  0054 02/03/24  1208 02/03/24  0245   SODIUM mmol/L 134* 136  --  137   POTASSIUM mmol/L 4.3 3.7 3.9 3.4*   CHLORIDE mmol/L 100 101  --  100   CO2 mmol/L 23.0 25.0  --  25.0   BUN mg/dL 11 8  --  7   CREATININE mg/dL 0.72* 0.57*  --  0.62*   CALCIUM mg/dL 9.7 9.3  --  9.3   BILIRUBIN mg/dL 0.3 0.3  --  0.4   ALK PHOS U/L 76 75  --  82   ALT (SGPT) U/L 140* 168*  --  190*   AST (SGOT) U/L 62* 93*  --  153*   GLUCOSE mg/dL 86 107*  --  122*     Results from last 7 days   Lab Units 02/05/24  0021   HEMOGLOBIN g/dL 14.0   HEMATOCRIT % 41.7     COVID19   Date Value Ref Range Status   02/01/2024 Not Detected Not Detected - Ref. Range Final     Lab Results   Component Value Date    HGBA1C 5.50 01/16/2024       RD to follow up per protocol.    Electronically signed by:  Verónica Hernandez  02/05/24 09:58 EST

## 2024-02-06 LAB
BACTERIA SPEC AEROBE CULT: NORMAL
BACTERIA SPEC AEROBE CULT: NORMAL

## 2024-02-07 ENCOUNTER — READMISSION MANAGEMENT (OUTPATIENT)
Dept: CALL CENTER | Facility: HOSPITAL | Age: 39
End: 2024-02-07
Payer: MEDICARE

## 2024-02-07 NOTE — OUTREACH NOTE
Medical Week 1 Survey      Flowsheet Row Responses   [facility] patient discharged from? [facility]   Does the patient have one of the following disease processes/diagnoses(primary or secondary)? Other   Week 1 attempt successful? Yes   Call start time 1319   Call end time 1322   Discharge diagnosis Abdominal pain, bile leak (recent lap choley 1/22)   Is patient permission given to speak with other caregiver? Yes   Person spoke with today (if not patient) and relationship BALAJI GARCÍA Significant Other   Meds reviewed with patient/caregiver? Yes   Does the patient have all medications ordered at discharge? Yes   Is the patient taking all medications as directed (includes completed medication regime)? Yes   Does the patient have a primary care provider?  Yes   Does the patient have an appointment with their PCP within 7 days of discharge? No   Comments regarding PCP Follow up with Wally Henderson PCP   Nursing Interventions Educated patient on importance of making appointment, Advised patient to make appointment   Has the patient kept scheduled appointments due by today? N/A   Comments Reports has follow up with Dr Bran on Monday 2/12,  planned to get drain removed.   What is the Home health agency?  [facility]   Has home health visited the patient within 72 hours of discharge? Yes   Psychosocial issues? No   Did the patient receive a copy of their discharge instructions? Yes   Nursing interventions Reviewed instructions with patient   What is the patient's perception of their health status since discharge? Improving   Is the patient/caregiver able to teach back signs and symptoms related to disease process for when to call PCP? Yes   Is the patient/caregiver able to teach back the hierarchy of who to call/visit for symptoms/problems? PCP, Specialist, Home health nurse, Urgent Care, ED, 911 Yes   Week 1 call completed? Yes   Would this patient benefit from a Referral to [facility] Social Work? No   Is the patient interested  in additional calls from an ambulatory ? No   Call end time 1322            EVERTON HUNTER - Registered Nurse

## 2024-02-12 ENCOUNTER — OFFICE VISIT (OUTPATIENT)
Dept: SURGERY | Facility: CLINIC | Age: 39
End: 2024-02-12
Payer: MEDICARE

## 2024-02-12 VITALS
HEART RATE: 60 BPM | HEIGHT: 76 IN | TEMPERATURE: 98.2 F | WEIGHT: 159.4 LBS | OXYGEN SATURATION: 97 % | DIASTOLIC BLOOD PRESSURE: 68 MMHG | SYSTOLIC BLOOD PRESSURE: 119 MMHG | BODY MASS INDEX: 19.41 KG/M2

## 2024-02-12 DIAGNOSIS — Z09 POSTOP CHECK: Primary | ICD-10-CM

## 2024-02-12 PROCEDURE — 1160F RVW MEDS BY RX/DR IN RCRD: CPT | Performed by: SURGERY

## 2024-02-12 PROCEDURE — 99024 POSTOP FOLLOW-UP VISIT: CPT | Performed by: SURGERY

## 2024-02-12 PROCEDURE — 3074F SYST BP LT 130 MM HG: CPT | Performed by: SURGERY

## 2024-02-12 PROCEDURE — 1159F MED LIST DOCD IN RCRD: CPT | Performed by: SURGERY

## 2024-02-12 PROCEDURE — 3078F DIAST BP <80 MM HG: CPT | Performed by: SURGERY

## 2024-02-15 ENCOUNTER — READMISSION MANAGEMENT (OUTPATIENT)
Dept: CALL CENTER | Facility: HOSPITAL | Age: 39
End: 2024-02-15
Payer: MEDICARE

## 2024-02-19 ENCOUNTER — READMISSION MANAGEMENT (OUTPATIENT)
Dept: CALL CENTER | Facility: HOSPITAL | Age: 39
End: 2024-02-19
Payer: MEDICARE

## 2024-02-19 NOTE — OUTREACH NOTE
Medical Week 2 Survey      Flowsheet Row Responses   [facility] patient discharged from? [facility]   Does the patient have one of the following disease processes/diagnoses(primary or secondary)? Other   Week 2 attempt successful? Yes   Call start time 1618   Discharge diagnosis Abdominal pain, bile leak (recent lap choley 1/22)   Revoke Decline to participate   Call end time 1618   Person spoke with today (if not patient) and relationship BALAJI GARCÍA Significant Other   Call end time 1618            Amrita MATTHEW - Registered Nurse

## 2024-04-11 ENCOUNTER — LAB (OUTPATIENT)
Dept: LAB | Facility: HOSPITAL | Age: 39
End: 2024-04-11
Payer: MEDICARE

## 2024-04-11 ENCOUNTER — HOSPITAL ENCOUNTER (OUTPATIENT)
Dept: CARDIOLOGY | Facility: HOSPITAL | Age: 39
Discharge: HOME | End: 2024-04-11
Payer: MEDICARE

## 2024-04-11 LAB
ANION GAP SERPL CALCULATED.3IONS-SCNC: 10.2 MMOL/L (ref 5–15)
BUN SERPL-MCNC: 17 MG/DL (ref 6–20)
BUN/CREAT SERPL: 23 (ref 7–25)
CALCIUM SPEC-SCNC: 9.8 MG/DL (ref 8.6–10.5)
CHLORIDE SERPL-SCNC: 103 MMOL/L (ref 98–107)
CO2 SERPL-SCNC: 25.8 MMOL/L (ref 22–29)
CREAT SERPL-MCNC: 0.74 MG/DL (ref 0.76–1.27)
DEPRECATED RDW RBC AUTO: 39.8 FL (ref 37–54)
EGFRCR SERPLBLD CKD-EPI 2021: 118.2 ML/MIN/1.73
ERYTHROCYTE [DISTWIDTH] IN BLOOD BY AUTOMATED COUNT: 12.8 % (ref 12.3–15.4)
GLUCOSE SERPL-MCNC: 106 MG/DL (ref 65–99)
HCT VFR BLD AUTO: 48.8 % (ref 37.5–51)
HGB BLD-MCNC: 15.9 G/DL (ref 13–17.7)
INR PPP: 1.53 (ref 0.93–1.1)
MCH RBC QN AUTO: 27.9 PG (ref 26.6–33)
MCHC RBC AUTO-ENTMCNC: 32.6 G/DL (ref 31.5–35.7)
MCV RBC AUTO: 85.8 FL (ref 79–97)
PLATELET # BLD AUTO: 209 10*3/MM3 (ref 140–450)
PMV BLD AUTO: 12.6 FL (ref 6–12)
POTASSIUM SERPL-SCNC: 3.9 MMOL/L (ref 3.5–5.2)
PROTHROMBIN TIME: 16.2 SECONDS (ref 9.6–11.7)
QT INTERVAL: 453 MS
QTC INTERVAL: 447 MS
RBC # BLD AUTO: 5.69 10*6/MM3 (ref 4.14–5.8)
SODIUM SERPL-SCNC: 139 MMOL/L (ref 136–145)
WBC NRBC COR # BLD AUTO: 7.17 10*3/MM3 (ref 3.4–10.8)

## 2024-04-11 PROCEDURE — 85027 COMPLETE CBC AUTOMATED: CPT

## 2024-04-11 PROCEDURE — 93005 ELECTROCARDIOGRAM TRACING: CPT | Performed by: INTERNAL MEDICINE

## 2024-04-11 PROCEDURE — 80048 BASIC METABOLIC PNL TOTAL CA: CPT

## 2024-04-11 PROCEDURE — 85610 PROTHROMBIN TIME: CPT | Performed by: INTERNAL MEDICINE

## 2024-04-17 ENCOUNTER — ANESTHESIA EVENT (OUTPATIENT)
Dept: GASTROENTEROLOGY | Facility: HOSPITAL | Age: 39
End: 2024-04-17
Payer: MEDICARE

## 2024-04-18 ENCOUNTER — APPOINTMENT (OUTPATIENT)
Dept: GENERAL RADIOLOGY | Facility: HOSPITAL | Age: 39
End: 2024-04-18
Payer: MEDICARE

## 2024-04-18 ENCOUNTER — HOSPITAL ENCOUNTER (OUTPATIENT)
Facility: HOSPITAL | Age: 39
Setting detail: HOSPITAL OUTPATIENT SURGERY
Discharge: HOME | End: 2024-04-18
Attending: INTERNAL MEDICINE | Admitting: INTERNAL MEDICINE
Payer: MEDICARE

## 2024-04-18 ENCOUNTER — ANESTHESIA (OUTPATIENT)
Dept: GASTROENTEROLOGY | Facility: HOSPITAL | Age: 39
End: 2024-04-18
Payer: MEDICARE

## 2024-04-18 ENCOUNTER — ON CAMPUS - OUTPATIENT (OUTPATIENT)
Dept: URBAN - METROPOLITAN AREA HOSPITAL 85 | Facility: HOSPITAL | Age: 39
End: 2024-04-18
Payer: MEDICAID

## 2024-04-18 VITALS
DIASTOLIC BLOOD PRESSURE: 66 MMHG | SYSTOLIC BLOOD PRESSURE: 126 MMHG | BODY MASS INDEX: 21.05 KG/M2 | HEART RATE: 54 BPM | WEIGHT: 172.84 LBS | OXYGEN SATURATION: 95 % | HEIGHT: 76 IN | TEMPERATURE: 96.2 F | RESPIRATION RATE: 19 BRPM

## 2024-04-18 DIAGNOSIS — T85.590A OTHER MECHANICAL COMPLICATION OF BILE DUCT PROSTHESIS, INITI: ICD-10-CM

## 2024-04-18 DIAGNOSIS — K80.50 CALCULUS OF BILE DUCT WITHOUT CHOLANGITIS OR CHOLECYSTITIS W: ICD-10-CM

## 2024-04-18 DIAGNOSIS — K83.2 PERFORATION OF BILE DUCT: ICD-10-CM

## 2024-04-18 LAB
INR PPP: 1.63 (ref 0.93–1.1)
PROTHROMBIN TIME: 17.1 SECONDS (ref 9.6–11.7)
QT INTERVAL: 453 MS
QTC INTERVAL: 447 MS

## 2024-04-18 PROCEDURE — 25510000001 IOPAMIDOL 61 % SOLUTION 30 ML VIAL: Performed by: INTERNAL MEDICINE

## 2024-04-18 PROCEDURE — 25010000002 ONDANSETRON PER 1 MG: Performed by: NURSE ANESTHETIST, CERTIFIED REGISTERED

## 2024-04-18 PROCEDURE — 25810000003 SODIUM CHLORIDE 0.9 % SOLUTION: Performed by: INTERNAL MEDICINE

## 2024-04-18 PROCEDURE — 25010000002 FENTANYL CITRATE (PF) 100 MCG/2ML SOLUTION: Performed by: NURSE ANESTHETIST, CERTIFIED REGISTERED

## 2024-04-18 PROCEDURE — 43275 ERCP REMOVE FORGN BODY DUCT: CPT | Performed by: INTERNAL MEDICINE

## 2024-04-18 PROCEDURE — 43264 ERCP REMOVE DUCT CALCULI: CPT | Performed by: INTERNAL MEDICINE

## 2024-04-18 PROCEDURE — 25010000002 SUGAMMADEX 200 MG/2ML SOLUTION: Performed by: NURSE ANESTHETIST, CERTIFIED REGISTERED

## 2024-04-18 PROCEDURE — C1769 GUIDE WIRE: HCPCS | Performed by: INTERNAL MEDICINE

## 2024-04-18 PROCEDURE — 25010000002 DEXAMETHASONE PER 1 MG: Performed by: NURSE ANESTHETIST, CERTIFIED REGISTERED

## 2024-04-18 PROCEDURE — 85610 PROTHROMBIN TIME: CPT | Performed by: INTERNAL MEDICINE

## 2024-04-18 PROCEDURE — 25010000002 PROPOFOL 1000 MG/100ML EMULSION: Performed by: NURSE ANESTHETIST, CERTIFIED REGISTERED

## 2024-04-18 PROCEDURE — 74328 X-RAY BILE DUCT ENDOSCOPY: CPT

## 2024-04-18 RX ORDER — LIDOCAINE HYDROCHLORIDE 20 MG/ML
INJECTION, SOLUTION EPIDURAL; INFILTRATION; INTRACAUDAL; PERINEURAL AS NEEDED
Status: DISCONTINUED | OUTPATIENT
Start: 2024-04-18 | End: 2024-04-18 | Stop reason: SURG

## 2024-04-18 RX ORDER — DROPERIDOL 2.5 MG/ML
0.62 INJECTION, SOLUTION INTRAMUSCULAR; INTRAVENOUS ONCE AS NEEDED
Status: DISCONTINUED | OUTPATIENT
Start: 2024-04-18 | End: 2024-04-18 | Stop reason: HOSPADM

## 2024-04-18 RX ORDER — ONDANSETRON 2 MG/ML
4 INJECTION INTRAMUSCULAR; INTRAVENOUS ONCE AS NEEDED
Status: DISCONTINUED | OUTPATIENT
Start: 2024-04-18 | End: 2024-04-18 | Stop reason: HOSPADM

## 2024-04-18 RX ORDER — LABETALOL HYDROCHLORIDE 5 MG/ML
5 INJECTION, SOLUTION INTRAVENOUS
Status: DISCONTINUED | OUTPATIENT
Start: 2024-04-18 | End: 2024-04-18 | Stop reason: HOSPADM

## 2024-04-18 RX ORDER — FLUMAZENIL 0.1 MG/ML
0.2 INJECTION INTRAVENOUS AS NEEDED
Status: DISCONTINUED | OUTPATIENT
Start: 2024-04-18 | End: 2024-04-18 | Stop reason: HOSPADM

## 2024-04-18 RX ORDER — HYDRALAZINE HYDROCHLORIDE 20 MG/ML
5 INJECTION INTRAMUSCULAR; INTRAVENOUS
Status: DISCONTINUED | OUTPATIENT
Start: 2024-04-18 | End: 2024-04-18 | Stop reason: HOSPADM

## 2024-04-18 RX ORDER — PROPOFOL 10 MG/ML
INJECTION, EMULSION INTRAVENOUS AS NEEDED
Status: DISCONTINUED | OUTPATIENT
Start: 2024-04-18 | End: 2024-04-18 | Stop reason: SURG

## 2024-04-18 RX ORDER — ROCURONIUM BROMIDE 10 MG/ML
INJECTION, SOLUTION INTRAVENOUS AS NEEDED
Status: DISCONTINUED | OUTPATIENT
Start: 2024-04-18 | End: 2024-04-18 | Stop reason: SURG

## 2024-04-18 RX ORDER — ACETAMINOPHEN 325 MG/1
650 TABLET ORAL ONCE AS NEEDED
Status: DISCONTINUED | OUTPATIENT
Start: 2024-04-18 | End: 2024-04-18 | Stop reason: HOSPADM

## 2024-04-18 RX ORDER — IPRATROPIUM BROMIDE AND ALBUTEROL SULFATE 2.5; .5 MG/3ML; MG/3ML
3 SOLUTION RESPIRATORY (INHALATION) ONCE AS NEEDED
Status: DISCONTINUED | OUTPATIENT
Start: 2024-04-18 | End: 2024-04-18 | Stop reason: HOSPADM

## 2024-04-18 RX ORDER — ONDANSETRON 2 MG/ML
INJECTION INTRAMUSCULAR; INTRAVENOUS AS NEEDED
Status: DISCONTINUED | OUTPATIENT
Start: 2024-04-18 | End: 2024-04-18 | Stop reason: SURG

## 2024-04-18 RX ORDER — NALOXONE HCL 0.4 MG/ML
0.4 VIAL (ML) INJECTION AS NEEDED
Status: DISCONTINUED | OUTPATIENT
Start: 2024-04-18 | End: 2024-04-18 | Stop reason: HOSPADM

## 2024-04-18 RX ORDER — EPHEDRINE SULFATE 5 MG/ML
5 INJECTION INTRAVENOUS ONCE AS NEEDED
Status: DISCONTINUED | OUTPATIENT
Start: 2024-04-18 | End: 2024-04-18 | Stop reason: HOSPADM

## 2024-04-18 RX ORDER — SODIUM CHLORIDE 9 MG/ML
50 INJECTION, SOLUTION INTRAVENOUS CONTINUOUS
Status: DISCONTINUED | OUTPATIENT
Start: 2024-04-18 | End: 2024-04-18 | Stop reason: HOSPADM

## 2024-04-18 RX ORDER — FENTANYL CITRATE 50 UG/ML
INJECTION, SOLUTION INTRAMUSCULAR; INTRAVENOUS AS NEEDED
Status: DISCONTINUED | OUTPATIENT
Start: 2024-04-18 | End: 2024-04-18 | Stop reason: SURG

## 2024-04-18 RX ORDER — DEXAMETHASONE SODIUM PHOSPHATE 4 MG/ML
INJECTION, SOLUTION INTRA-ARTICULAR; INTRALESIONAL; INTRAMUSCULAR; INTRAVENOUS; SOFT TISSUE AS NEEDED
Status: DISCONTINUED | OUTPATIENT
Start: 2024-04-18 | End: 2024-04-18 | Stop reason: SURG

## 2024-04-18 RX ORDER — OXYCODONE HYDROCHLORIDE 5 MG/1
5 TABLET ORAL ONCE AS NEEDED
Status: DISCONTINUED | OUTPATIENT
Start: 2024-04-18 | End: 2024-04-18 | Stop reason: HOSPADM

## 2024-04-18 RX ADMIN — ROCURONIUM BROMIDE 50 MG: 10 INJECTION, SOLUTION INTRAVENOUS at 09:27

## 2024-04-18 RX ADMIN — PROPOFOL INJECTABLE EMULSION 200 MG: 10 INJECTION, EMULSION INTRAVENOUS at 09:27

## 2024-04-18 RX ADMIN — FENTANYL CITRATE 25 MCG: 50 INJECTION, SOLUTION INTRAMUSCULAR; INTRAVENOUS at 10:17

## 2024-04-18 RX ADMIN — SODIUM CHLORIDE 50 ML/HR: 9 INJECTION, SOLUTION INTRAVENOUS at 08:01

## 2024-04-18 RX ADMIN — ONDANSETRON 4 MG: 2 INJECTION INTRAMUSCULAR; INTRAVENOUS at 09:37

## 2024-04-18 RX ADMIN — FENTANYL CITRATE 25 MCG: 50 INJECTION, SOLUTION INTRAMUSCULAR; INTRAVENOUS at 09:48

## 2024-04-18 RX ADMIN — SUGAMMADEX 200 MG: 100 INJECTION, SOLUTION INTRAVENOUS at 10:08

## 2024-04-18 RX ADMIN — DEXAMETHASONE SODIUM PHOSPHATE 4 MG: 4 INJECTION, SOLUTION INTRAMUSCULAR; INTRAVENOUS at 09:37

## 2024-04-18 RX ADMIN — LIDOCAINE HYDROCHLORIDE 100 MG: 20 INJECTION, SOLUTION EPIDURAL; INFILTRATION; INTRACAUDAL; PERINEURAL at 09:27

## 2024-04-18 NOTE — DISCHARGE INSTRUCTIONS
A responsible adult should stay with you and you should rest quietly for the rest of the day.    Do not drink alcohol, drive, operate any heavy machinery or power tools or make any legal/important decisions for the next 24 hours.     Progress your diet as tolerated.  If you begin to experience severe pain, increased shortness of breath, racing heartbeat or a fever above 101 F, seek immediate medical attention.     Follow up with MD as instructed. Call office for results in 3 to 5 days if needed. [phone]    Impression:  1.  Common bile duct stone  2.  Successful stone and stent removal     Recommendations:  1.  Regular diet  2.  Follow-up in my office as needed

## 2024-04-18 NOTE — H&P
GI PREOPERATIVE HISTORY AND PHYSICAL:    Referring Provider:    Wally Henderson MD    Chief complaint: Bile leak    Subjective .     History of present illness:      James Rodriguez is a 38 y.o. male who presents today for Procedure(s):  ENDOSCOPIC RETROGRADE CHOLANGIOPANCREATOGRAPHY WITH STENT REMOVAL for the indications listed below.     Bile leak    The updated Patient Profile was reviewed prior to the procedure, in conjunction with the Physical Exam, including medical conditions, surgical procedures, medications, allergies, family history and social history.     Pre-operatively, I reviewed the indication(s) for the procedure, the risks of the procedure [including but not limited to: unexpected bleeding possibly requiring hospitalization and/or unplanned repeat procedures, perforation possibly requiring surgical treatment, missed lesions and complications of sedation/MAC (also explained by anesthesia staff)].     I have evaluated the patient for risks associated with the planned anesthesia and the procedure to be performed and find the patient an acceptable candidate for IV sedation.    Multiple opportunities were provided for any questions or concerns, and all questions were answered satisfactorily before any anesthesia was administered. We will proceed with the planned procedure.    Past Medical History:  Past Medical History:   Diagnosis Date    History of open heart surgery     1997, 1998    mitral valve replacement    Hyperlipidemia     Hypertension     Marfan's disease     Warfarin anticoagulation        Past Surgical History:  Past Surgical History:   Procedure Laterality Date    CARDIAC VALVE REPLACEMENT      1998    CHOLECYSTECTOMY N/A 1/22/2024    Procedure: CHOLECYSTECTOMY LAPAROSCOPIC WITH DAVINCI ROBOT;  Surgeon: Chandrika Bran MD;  [facility]: [facility];  Service: Robotics - DaVinci;  Laterality: N/A;    ERCP N/A 2/2/2024    Procedure: ENDOSCOPIC RETROGRADE CHOLANGIOPANCREATOGRAPHY with  sphinctorotomy and placement of 10 Fr. x 7cm biliary stent;  Surgeon: Los Hodgson MD;  Location: [facility] ENDOSCOPY;  Service: Gastroenterology;  Laterality: N/A;       Social History:  Social History     Tobacco Use    Smoking status: Some Days     Current packs/day: 0.50     Types: Cigarettes     Passive exposure: Current    Smokeless tobacco: Never   Vaping Use    Vaping status: Former   Substance Use Topics    Alcohol use: Not Currently    Drug use: Yes     Types: Marijuana       Family History:  History reviewed. No pertinent family history.    Medications:  Medications Prior to Admission   Medication Sig Dispense Refill Last Dose    acetaminophen (TYLENOL) 500 MG tablet Take 1 tablet by mouth Every 4 (Four) Hours As Needed for Mild Pain, Fever or Headache.   Past Month    atorvastatin (LIPITOR) 10 MG tablet Take 1 tablet by mouth Daily.   Past Month    losartan (COZAAR) 100 MG tablet Take 1 tablet by mouth Daily.   Past Week    sertraline (ZOLOFT) 100 MG tablet Take 2 tablets by mouth Daily.   4/17/2024    warfarin (COUMADIN) 7.5 MG tablet Take 1 tablet by mouth Every Night.   Past Week       Scheduled Meds:   Continuous Infusions:sodium chloride, 50 mL/hr, Last Rate: 50 mL/hr (04/18/24 0801)      PRN Meds:.    ALLERGIES:  Patient has no known allergies.    ROS:  The following systems were reviewed and negative;   Constitution:  No fevers, chills, no unintentional weight loss  Skin: no rash, no jaundice  Eyes:  No blurry vision, no eye pain  HENT:  No change in hearing or smell  Resp:  No dyspnea or cough  CV:  No chest pain or palpitations  :  No dysuria, hematuria  Musculoskeletal:  No leg cramps or arthralgias  Neuro:  No tremor, no numbness  Psych:  No depression or confsuion    Objective     Vital Signs:   Vitals:    04/04/24 1125 04/18/24 0746   BP:  122/65   BP Location:  Left arm   Patient Position:  Sitting   Pulse:  53   Resp:  20   Temp:  98.2 °F (36.8 °C)   TempSrc:  Oral   SpO2:  96%  "  Weight: 79.4 kg (175 lb) 78.4 kg (172 lb 13.5 oz)   Height: 193 cm (76\") 193 cm (76\")       Physical Exam:       General Appearance:    Awake and alert, in no acute distress   Head:    Normocephalic, without obvious abnormality, atraumatic   Throat:   No oral lesions, no thrush, oral mucosa moist   Lungs  Cardiac:  Abdomen:  Extremities:     Respirations regular, even and unlabored    Regular rate and rhythm, no murmur, gallop, rub    Non-distended, good bowel sounds, non tender, no masses     No edema, pulses 2+   Skin:   No rash, no jaundice       Results Review:  Lab Results (last 24 hours)       Procedure Component Value Units Date/Time    Protime-INR [[id number]] Collected: 04/18/24 0754    Specimen: Blood from Arm, Right Updated: 04/18/24 0759            Imaging Results (Last 24 Hours)       ** No results found for the last 24 hours. **             I reviewed the patient's labs and imaging.    ASSESSMENT AND PLAN:  Bile leak    Active Problems:    * No active hospital problems. *       Procedure(s):  ENDOSCOPIC RETROGRADE CHOLANGIOPANCREATOGRAPHY WITH STENT REMOVAL      I discussed the patients findings and my recommendations with the patient.    Los Hodgson MD  04/18/24  08:08 EDT    "

## 2024-04-18 NOTE — OP NOTE
ENDOSCOPIC RETROGRADE CHOLANGIOPANCREATOGRAPHY Procedure Report    Patient Name:  James Rodriguez  YOB: 1985    Date of Surgery:  4/18/2024     Pre-Op Diagnosis:  Bile leak [K83.9]    Postop diagnosis:  1.  Common bile duct stone  2.  Stent removal        Procedure/CPT® Codes:      Procedure(s):  ENDOSCOPIC RETROGRADE CHOLANGIOPANCREATOGRAPHY WITH STENT REMOVAL, mechanical lithotripsy, and endoscopic balloon clearance of bile duct    Staff:  Surgeon(s):  Los Hodgson MD      Anesthesia: General    Description of Procedure:  A description of the procedure as well as risks, benefits and alternative methods were explained to the patient who voiced understanding and signed the corresponding consent form.Specifically risks of post-ERCP pancreatitis, bleeding, perforation, failure to canulate and adverse reaction to sedation were discussed. A physical exam was performed and vital signs were monitored throughout the procedure.    A  film was performed which was normal. With the patient in the semi-prone position, an Olympus side viewing endoscope was placed into the mouth and proceeded through the esophagus, stomach and second portion of the duodenum without difficulty. Limited views of the esophagus and stomach were normal. The ampulla was visualized and appeared normal. A [employer] hydrotome was used to cannulate the ampulla using wire guided technique. Bile was aspirated to ensure this was the duct of interest. Contrast was injected into the bile duct.      The scope was then retroflexed and the fundus was visualized. The procedure was not difficult and there were no immediate complications. There was no blood loss.    Findings:   Previously placed biliary stent noted in good position.  A snare was passed and using the snare the stent was removed.  Then a dream tome sphincterotome was used to obtain a cholangiogram.  This revealed a mildly dilated common bile duct to approximately  9 mm.  Proximally within the duct just below the bifurcation there was a round 6 mm filling defect consistent with a stone.  Therefore the dream tome sphincterotome was withdrawn and attempts were made to pass a 9/12 mm balloon proximal to the stone so that it could be extracted.  However the balloon catheter would not pass because of resistance within the duct.  Therefore the wire and balloon catheter were withdrawn and a Dormia stone extraction basket was passed.  Using the basket I was able to get above the stone and pull it down and out.  There were 2-3 fragmented yellowish cholesterol type stone fragments removed.  Finally the 9/12 mm stone extraction balloon was passed over the wire and the balloon was inflated to 9 mm and pulled down through the duct and the duct was noted to be completely clear without filling defects.  Therefore the balloon and wire were withdrawn the endoscope was withdrawn and the procedure was completed.  The patient tolerated procedure well and there were no immediate complications.    Impression:  1.  Common bile duct stone  2.  Successful stone and stent removal    Recommendations:  1.  Regular diet  2.  Follow-up in my office as needed      Los Hodgson MD     Date: 4/18/2024    Time: 10:10 EDT

## 2024-04-18 NOTE — ANESTHESIA PREPROCEDURE EVALUATION
Anesthesia Evaluation     Patient summary reviewed and Nursing notes reviewed   no history of anesthetic complications:   NPO Solid Status: > 8 hours  NPO Liquid Status: > 2 hours           Airway   Mallampati: II  TM distance: >3 FB  Neck ROM: full  Dental - normal exam     Pulmonary - normal exam   (+) a smoker Current,  Cardiovascular - normal exam    ECG reviewed  PT is on anticoagulation therapy    (+) hypertension, valvular problems/murmurs MR, hyperlipidemia      Neuro/Psych- negative ROS  GI/Hepatic/Renal/Endo      Musculoskeletal     Abdominal    Substance History   (+) drug use     OB/GYN          Other        ROS/Med Hx Other: Additional History:  Marfan's, abd pain, N/V, MJ abuse    Echo:  Echocardiogram Findings    Left Ventricle Left ventricular ejection fraction appears to be 61 - 65%.     Normal left ventricular cavity size and wall thickness noted. All left ventricular wall segments contract normally. Left ventricular diastolic function is consistent with (grade I) impaired relaxation.  Right Ventricle Normal right ventricular cavity size, wall thickness, systolic function and septal motion noted.  Left Atrium Normal left atrial size and volume noted.  Right Atrium Normal right atrial cavity size noted. The inferior vena cava is normally sized. Normal IVC inspiratory collapse of greater than 50% noted.  Aortic Valve No aortic valve regurgitation is present. No hemodynamically significant aortic valve stenosis is present. There is a mechanical aortic valve prosthesis present.  Mitral Valve The mitral valve is structurally normal with no significant stenosis present. Mild mitral valve regurgitation is present.  Tricuspid Valve The tricuspid valve is structurally normal with no stenosis present. Trace tricuspid valve regurgitation is present. Estimated right ventricular systolic pressure from tricuspid regurgitation is normal (<35 mmHg).  Pulmonic Valve The pulmonic valve is not well visualized. No  pulmonic valve regurgitation or significant stenosis is present.  Greater Vessels The aortic root not well visualized.  Pericardium The pericardium is normal. There is no evidence of pericardial effusion. .        PSH:  CARDIAC VALVE REPLACEMENT - AVR              Anesthesia Plan    ASA 3     general     (Patient identified; pre-operative vital signs, all relevant labs/studies, complete medical/surgical/anesthetic history, full medication list, full allergy list, and NPO status obtained/reviewed; physical assessment performed; anesthetic options, side effects, potential complications, risks, and benefits discussed; questions answered; written anesthesia consent obtained; patient cleared for procedure; anesthesia machine and equipment checked and functioning)  intravenous induction     Anesthetic plan, risks, benefits, and alternatives have been provided, discussed and informed consent has been obtained with: patient.    Plan discussed with CRNA and CAA.    CODE STATUS:

## 2024-04-18 NOTE — ANESTHESIA PROCEDURE NOTES
Airway  Urgency: elective    Date/Time: 4/18/2024 9:32 AM  Airway not difficult    General Information and Staff    Patient location during procedure: OR  Anesthesiologist: Aleksandra Garcia MD  CRNA/CAA: Sandra Huddleston CRNA    Indications and Patient Condition  Indications for airway management: airway protection    Preoxygenated: yes (pt pre-O2 with 100% O2)  Mask difficulty assessment: 2 - vent by mask + OA or adjuvant +/- NMBA (easy BMV )    Final Airway Details  Final airway type: endotracheal airway      Successful airway: ETT  Cuffed: yes   Successful intubation technique: direct laryngoscopy  Facilitating devices/methods: anterior pressure/BURP  Endotracheal tube insertion site: oral  Blade: Ryne  Blade size: 4  ETT size (mm): 7.5  Cormack-Lehane Classification: grade I - full view of glottis  Placement verified by: chest auscultation and capnometry   Cuff volume (mL): 7  Measured from: lips  Number of attempts at approach: 1  Assessment: lips, teeth, and gum same as pre-op and atraumatic intubation    Additional Comments  ATOETx1. No change in dentition.

## 2024-04-18 NOTE — ANESTHESIA POSTPROCEDURE EVALUATION
Patient: James Rodriguez    Procedure Summary       Date: 04/18/24 Room / Location: [facility] ENDOSCOPY 2 / [facility] ENDOSCOPY    Anesthesia Start: 0924 Anesthesia Stop: 1018    Procedure: ENDOSCOPIC RETROGRADE CHOLANGIOPANCREATOGRAPHY WITH STENT REMOVAL, mechanical lithotripsy, and endoscopic balloon clearance of bile duct Diagnosis:       Bile leak      (Bile leak [K83.9])    Surgeons: Los Hodgson MD Provider: Aleksandra Garcia MD    Anesthesia Type: general ASA Status: 3            Anesthesia Type: general    Vitals  Vitals Value Taken Time   /66 04/18/24 1047   Temp 96.2 °F (35.7 °C) 04/18/24 1023   Pulse 54 04/18/24 1047   Resp 19 04/18/24 1047   SpO2 95 % 04/18/24 1047           Post Anesthesia Care and Evaluation    Patient location during evaluation: PACU  Patient participation: complete - patient participated  Level of consciousness: awake and alert  Pain management: satisfactory to patient    Airway patency: patent  Anesthetic complications: No anesthetic complications  PONV Status: none  Cardiovascular status: acceptable  Respiratory status: acceptable  Hydration status: acceptable

## 2024-07-24 ENCOUNTER — APPOINTMENT (OUTPATIENT)
Dept: GENERAL RADIOLOGY | Facility: HOSPITAL | Age: 39
End: 2024-07-24
Payer: MEDICARE

## 2024-07-24 ENCOUNTER — HOSPITAL ENCOUNTER (EMERGENCY)
Facility: HOSPITAL | Age: 39
Discharge: HOME | End: 2024-07-24
Payer: MEDICARE

## 2024-07-24 ENCOUNTER — APPOINTMENT (OUTPATIENT)
Dept: CT IMAGING | Facility: HOSPITAL | Age: 39
End: 2024-07-24
Payer: MEDICARE

## 2024-07-24 VITALS
OXYGEN SATURATION: 97 % | BODY MASS INDEX: 20.43 KG/M2 | TEMPERATURE: 97.5 F | RESPIRATION RATE: 16 BRPM | SYSTOLIC BLOOD PRESSURE: 124 MMHG | HEIGHT: 76 IN | WEIGHT: 167.77 LBS | HEART RATE: 58 BPM | DIASTOLIC BLOOD PRESSURE: 75 MMHG

## 2024-07-24 DIAGNOSIS — M54.2 NECK PAIN: ICD-10-CM

## 2024-07-24 DIAGNOSIS — R51.9 NONINTRACTABLE HEADACHE, UNSPECIFIED CHRONICITY PATTERN, UNSPECIFIED HEADACHE TYPE: ICD-10-CM
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
## 2024-07-24 LAB
BACTERIA UR QL AUTO: NORMAL /HPF
BILIRUB UR QL STRIP: ABNORMAL
CLARITY UR: CLEAR
COLOR UR: YELLOW
GLUCOSE UR STRIP-MCNC: NEGATIVE MG/DL
HGB UR QL STRIP.AUTO: NEGATIVE
HYALINE CASTS UR QL AUTO: NORMAL /LPF
KETONES UR QL STRIP: ABNORMAL
LEUKOCYTE ESTERASE UR QL STRIP.AUTO: ABNORMAL
NITRITE UR QL STRIP: NEGATIVE
PH UR STRIP.AUTO: 5.5 [PH] (ref 5–8)
PROT UR QL STRIP: ABNORMAL
RBC # UR STRIP: NORMAL /HPF
REF LAB TEST METHOD: NORMAL
SP GR UR STRIP: 1.03 (ref 1–1.03)
SQUAMOUS #/AREA URNS HPF: NORMAL /HPF
UROBILINOGEN UR QL STRIP: ABNORMAL
WBC # UR STRIP: NORMAL /HPF

## 2024-07-24 PROCEDURE — 71045 X-RAY EXAM CHEST 1 VIEW: CPT

## 2024-07-24 PROCEDURE — 99284 EMERGENCY DEPT VISIT MOD MDM: CPT

## 2024-07-24 PROCEDURE — 72125 CT NECK SPINE W/O DYE: CPT

## 2024-07-24 PROCEDURE — 81001 URINALYSIS AUTO W/SCOPE: CPT

## 2024-07-24 PROCEDURE — 70450 CT HEAD/BRAIN W/O DYE: CPT

## 2024-07-24 RX ORDER — LIDOCAINE 4 G/G
1 PATCH TOPICAL ONCE
Status: DISCONTINUED | OUTPATIENT
Start: 2024-07-24 | End: 2024-07-25 | Stop reason: HOSPADM

## 2024-07-24 RX ORDER — ACETAMINOPHEN 500 MG
1000 TABLET ORAL ONCE
Status: COMPLETED | OUTPATIENT
Start: 2024-07-24 | End: 2024-07-24

## 2024-07-24 RX ORDER — LIDOCAINE 50 MG/G
1 PATCH TOPICAL EVERY 24 HOURS
Qty: 5 PATCH | Refills: 0 | Status: SHIPPED | OUTPATIENT
Start: 2024-07-24 | End: 2024-07-29

## 2024-07-24 RX ADMIN — LIDOCAINE 1 PATCH: 4 PATCH TOPICAL at 19:51

## 2024-07-24 RX ADMIN — ACETAMINOPHEN 1000 MG: 500 TABLET, FILM COATED ORAL at 19:51
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
## 2024-07-25 NOTE — DISCHARGE INSTRUCTIONS
Take Tylenol as needed for pain and discomfort.    May use lidocaine patches to areas of discomfort.    Alternate ice and heat as needed for pain and discomfort.    Follow-up with primary care as needed.  Call to make an appointment.

## 2024-11-19 NOTE — PROGRESS NOTES
"Pharmacy dosing service  Anticoagulant  Warfarin     Subjective:    James Rodriguez is a 38 y.o.male being continued on warfarin for Mechanical Aortic Valve.    INR Goal: 2 - 3  Home medication?: warfarin 7.5 mg PO daily  Bridge Therapy Present?:  No   Interacting Medications Evaluation (New/Present/Discontinued):   May increase warfarin sensitivity: Augmentin (2/3-10), Tamiflu (2/3-8)  Additional Contributing Factors:   May increase warfarin sensitivity: acute viral illness, mild LFT elevation      Assessment/Plan:    INR remains therapeutic, watching for increased sensitivity with viral illness & mild LFT elevation. Continuing home regimen (7.5 mg daily).    Continue to monitor and adjust based on INR.         Date 2/3 2/4 2/5         INR 2.13 2.54 2.43         Dose 10 mg  7.5 mg 7.5 mg             Objective:  [Ht: 193 cm (76\"); Wt: 71.2 kg (156 lb 15.5 oz); BMI: Body mass index is 19.11 kg/m².]    Lab Results   Component Value Date    ALBUMIN 3.9 02/05/2024     Lab Results   Component Value Date    INR 2.43 02/05/2024    INR 2.54 02/04/2024    INR 2.13 02/03/2024    PROTIME 24.8 02/05/2024    PROTIME 25.8 02/04/2024    PROTIME 22.0 02/03/2024     Lab Results   Component Value Date    HGB 14.0 02/05/2024    HGB 13.8 02/04/2024    HGB 14.6 02/03/2024     Lab Results   Component Value Date    HCT 41.7 02/05/2024    HCT 40.3 02/04/2024    HCT 42.9 02/03/2024       Jenny Sparks, PharmD  02/05/24 09:46 EST   " Standing/Walking/Toileting/Moving from bed to stretcher

## (undated) DEVICE — SYR LUERLOK 50ML

## (undated) DEVICE — SOL H20 STRL 1000ML

## (undated) DEVICE — PK ENDO GI 50

## (undated) DEVICE — BITEBLOCK ENDO W/STRAP 60F A/ LF DISP

## (undated) DEVICE — NDL VERRES PNEUMO 120MM PN120 LF

## (undated) DEVICE — BG RETRV TISS SUPERBAG INTRO RIP/STOP NLY 10MM 240ML MD

## (undated) DEVICE — SUT ETHLN 2/0 PS 18IN 585H

## (undated) DEVICE — DEV POSITION LK AND BIOP CAP SYS

## (undated) DEVICE — SPHINCTEROTOME: Brand: DREAMTOME™ RX 44

## (undated) DEVICE — SUT VIC 0 UR6 27IN VCP603H

## (undated) DEVICE — SPNG GZ XRAY/DETECT 32PLY COTN 4X4IN STRL TY/10

## (undated) DEVICE — PAD, GROUNDING, UNIVERSAL, SPLIT, 9': Brand: MEDLINE

## (undated) DEVICE — TROC BLADLES XCEL/OPTI SLV THRD 5X100

## (undated) DEVICE — PASS SUT PRO BARIATRIC XL W/TROC SWABS

## (undated) DEVICE — SLV SCD CALF HEMOFORCE DVT THERP REPROC MD

## (undated) DEVICE — GLV SURG SENSICARE SLT PF LF 6 STRL

## (undated) DEVICE — SEAL CANN STLPR/12MM ENDOWRIST DAVINCI/X/XI 1P/U

## (undated) DEVICE — SYS VISABILITY LAP/SCPE LAPAROVUE CLN WARM 2/PRT 3TO12MM

## (undated) DEVICE — SYR LUERLOK 30CC

## (undated) DEVICE — Device

## (undated) DEVICE — GLV SURG SENSICARE POLYISPRN W/ALOE PF LF 6.5 GRN STRL

## (undated) DEVICE — TBG INSUFL LAP HIFLO W/12MM CONN 10FT

## (undated) DEVICE — BLANKT WARM UPPR/BDY ARM/OUT 57X196CM

## (undated) DEVICE — OBT BLADLES ENDOWRIST DAVINCI/X/XI 8MM DISP

## (undated) DEVICE — RETRIEVAL BALLOON CATHETER: Brand: EXTRACTOR™ PRO RX

## (undated) DEVICE — REDUCR CANN ENDOWRIST DAVINCI/X/XI 12-8MM 1P/U

## (undated) DEVICE — DRP ARM DAVINCI/X/XI DISP BX20

## (undated) DEVICE — DRN WND FLT FUL PERF NO/TROC 10MM

## (undated) DEVICE — PK GENERAL LAPAROSCOPY 50

## (undated) DEVICE — SPNG GZ XRAY/DETECT 16PLY COTN 4X4IN STRL TY/10

## (undated) DEVICE — DRN WND EVAC BULB 100CC

## (undated) DEVICE — PAD GRND E/S UNIV SPLT 9FT/CORD

## (undated) DEVICE — IRR SXN ENDOWRIST DAVINCI/X/XI 8MM 1P/U

## (undated) DEVICE — SEAL CANN ENDOWRIST DAVINCI/X/XI 5TO8MM

## (undated) DEVICE — KT SURG TURNOVER 050